# Patient Record
Sex: MALE | Race: WHITE | NOT HISPANIC OR LATINO | Employment: OTHER | URBAN - NONMETROPOLITAN AREA
[De-identification: names, ages, dates, MRNs, and addresses within clinical notes are randomized per-mention and may not be internally consistent; named-entity substitution may affect disease eponyms.]

---

## 2017-03-31 ENCOUNTER — OFFICE VISIT (OUTPATIENT)
Dept: OPHTHALMOLOGY | Facility: CLINIC | Age: 77
End: 2017-03-31

## 2017-03-31 DIAGNOSIS — IMO0002 NUCLEAR CATARACT, BILATERAL: ICD-10-CM

## 2017-03-31 DIAGNOSIS — E11.9 DIABETES MELLITUS WITHOUT COMPLICATION (HCC): ICD-10-CM

## 2017-03-31 DIAGNOSIS — H40.003 BORDERLINE GLAUCOMA, BILATERAL: Primary | ICD-10-CM

## 2017-03-31 DIAGNOSIS — H50.10 EXOTROPIA: ICD-10-CM

## 2017-03-31 PROCEDURE — 99213 OFFICE O/P EST LOW 20 MIN: CPT | Performed by: OPHTHALMOLOGY

## 2017-03-31 NOTE — PROGRESS NOTES
Subjective   David Chacon is a 76 y.o. male.   Chief Complaint   Patient presents with   • Cataract   • Diabetic Eye Exam   • Glaucoma Suspect     HPI     Cataract   Laterality: both eyes           Diabetic Eye Exam   Diabetes Type: Type 2   Duration: years   Blood Sugars: is controlled           Glaucoma Suspect   Laterality: both eyes   Compliance with Treatment: always           Comments   No vision change; XT       Last edited by Henrry Ramirez MD on 3/31/2017  8:38 AM. (History)          Review of Systems    Objective   Visual Acuity (Snellen - Linear)      Right Left   Dist cc 20/30 +2 20/60 +1       Correction:  Glasses         Wearing Rx      Sphere Cylinder Axis Add   Right [place] +1.00 022 +3.00   Left +0.25 +1.00 174 +3.00                  Not recorded         Extraocular Movement      Right Left   Result Full Full              Tonometry (Applanation, 8:40 AM)      Right Left   Pressure 15 15              Main Ophthalmology Exam     External Exam      Right Left    External Normal Normal      Slit Lamp Exam      Right Left    Lids/Lashes Normal Normal    Conjunctiva/Sclera White and quiet White and quiet    Cornea Clear Clear    Anterior Chamber Deep and quiet Deep and quiet    Iris Round and reactive Round and reactive    Lens 1+ Nuclear sclerosis 2+ Nuclear sclerosis    Vitreous Normal Normal      Fundus Exam      Right Left    Disc Thin rim 0.2 ST, IT Thin rim 0.1 ST, IT    Macula Normal Normal                Assessment/Plan   Diagnoses and all orders for this visit:    Borderline glaucoma, bilateral    Nuclear cataract, bilateral    Exotropia    Diabetes mellitus without complication    Plan:   cont latanoprost    Return in about 4 months (around 7/31/2017) for OCT discs.

## 2017-04-07 ENCOUNTER — OFFICE VISIT (OUTPATIENT)
Dept: OPHTHALMOLOGY | Facility: CLINIC | Age: 77
End: 2017-04-07

## 2017-04-07 DIAGNOSIS — E11.9 DIABETES MELLITUS WITHOUT COMPLICATION (HCC): ICD-10-CM

## 2017-04-07 DIAGNOSIS — H35.372 EPIRETINAL MEMBRANE, LEFT: ICD-10-CM

## 2017-04-07 DIAGNOSIS — H53.122 TRANSIENT VISUAL LOSS, LEFT: Primary | ICD-10-CM

## 2017-04-07 DIAGNOSIS — H50.10 EXOTROPIA: ICD-10-CM

## 2017-04-07 DIAGNOSIS — IMO0002 NUCLEAR CATARACT, BILATERAL: ICD-10-CM

## 2017-04-07 DIAGNOSIS — H40.003 BORDERLINE GLAUCOMA, BILATERAL: ICD-10-CM

## 2017-04-07 PROBLEM — H35.379 EPIRETINAL MEMBRANE: Status: ACTIVE | Noted: 2017-04-07

## 2017-04-07 PROBLEM — H53.129 TRANSIENT VISUAL LOSS: Status: ACTIVE | Noted: 2017-04-07

## 2017-04-07 PROCEDURE — 92014 COMPRE OPH EXAM EST PT 1/>: CPT | Performed by: OPHTHALMOLOGY

## 2017-04-07 PROCEDURE — 92134 CPTRZ OPH DX IMG PST SGM RTA: CPT | Performed by: OPHTHALMOLOGY

## 2017-04-07 NOTE — PROGRESS NOTES
Subjective   David Chacon is a 76 y.o. male.   Chief Complaint   Patient presents with   • Blurred Vision     HPI     Blurred Vision   Laterality: left eye   Onset: sudden   Quality: hazy   Severity: moderate   Onset: 12   Frequency: constantly   Timing: throughout the day           Comments   BV upper half  OS since yesterday, better today; hx of ERM, s/p PPV       Last edited by Henrry Ramirez MD on 4/7/2017  5:04 PM. (History)          Review of Systems    Objective   Base Eye Exam     Visual Acuity (Snellen - Linear)      Right Left   Dist cc 20/30 +2 20/40         Tonometry      Right Left   Pressure  17         Dilation     Left eye:  1.0% Mydriacyl, 2.5% Christopher Synephrine @ 4:15 PM            Slit Lamp and Fundus Exam     External Exam      Right Left    External Normal Normal      Slit Lamp Exam      Right Left    Lids/Lashes Normal Normal    Conjunctiva/Sclera White and quiet White and quiet    Cornea Clear Clear    Anterior Chamber Deep and quiet Deep and quiet    Iris Round and reactive Round and reactive    Lens 1+ Nuclear sclerosis 3+ Nuclear sclerosis    Vitreous Normal Normal      Fundus Exam      Right Left    Disc  Thin rim 0.1 ST, IT    Macula  pale retina edema inf to fove    Vessels  Normal, no embolus    Periphery  Normal            Refraction     Wearing Rx      Sphere Cylinder Axis Add   Right [place] +1.00 022 +3.00   Left +0.25 +1.00 174 +3.00                 OCT Macula:    OS- retinal thickening inf to fovea with irregular surface, no ERM    Assessment/Plan   Diagnoses and all orders for this visit:    Transient visual loss, left  Comments:  possible BRAO; r/o embolic source    Epiretinal membrane, left  Comments:  s/p PPV,     Exotropia    Nuclear cataract, bilateral    Borderline glaucoma, bilateral    Diabetes mellitus without complication      Plan:     rec re eval by Dr Carnes, Dr Hamman for possible embolic source  Continue latanoprost and ASA    Return in about 3 weeks (around  4/28/2017).

## 2017-04-25 ENCOUNTER — OFFICE VISIT (OUTPATIENT)
Dept: CARDIAC SURGERY | Facility: CLINIC | Age: 77
End: 2017-04-25

## 2017-04-25 ENCOUNTER — APPOINTMENT (OUTPATIENT)
Dept: LAB | Facility: HOSPITAL | Age: 77
End: 2017-04-25

## 2017-04-25 VITALS
DIASTOLIC BLOOD PRESSURE: 65 MMHG | HEART RATE: 62 BPM | WEIGHT: 175 LBS | BODY MASS INDEX: 25.92 KG/M2 | OXYGEN SATURATION: 98 % | HEIGHT: 69 IN | TEMPERATURE: 97.4 F | SYSTOLIC BLOOD PRESSURE: 113 MMHG

## 2017-04-25 DIAGNOSIS — R09.89 BRUIT: Primary | ICD-10-CM

## 2017-04-25 DIAGNOSIS — H53.9 VISION CHANGES: ICD-10-CM

## 2017-04-25 DIAGNOSIS — H34.9: ICD-10-CM

## 2017-04-25 LAB
ALBUMIN SERPL-MCNC: 4.3 G/DL (ref 3.4–4.8)
ALBUMIN/GLOB SERPL: 1.3 G/DL (ref 1.1–1.8)
ALP SERPL-CCNC: 69 U/L (ref 38–126)
ALT SERPL W P-5'-P-CCNC: 36 U/L (ref 21–72)
ANION GAP SERPL CALCULATED.3IONS-SCNC: 13 MMOL/L (ref 5–15)
AST SERPL-CCNC: 25 U/L (ref 17–59)
BASOPHILS # BLD AUTO: 0 10*3/MM3 (ref 0–0.2)
BASOPHILS NFR BLD AUTO: 0 % (ref 0–2)
BILIRUB SERPL-MCNC: 0.4 MG/DL (ref 0.2–1.3)
BUN BLD-MCNC: 39 MG/DL (ref 7–21)
BUN/CREAT SERPL: 20 (ref 7–25)
CALCIUM SPEC-SCNC: 9.2 MG/DL (ref 8.4–10.2)
CHLORIDE SERPL-SCNC: 104 MMOL/L (ref 95–110)
CO2 SERPL-SCNC: 23 MMOL/L (ref 22–31)
CREAT BLD-MCNC: 1.95 MG/DL (ref 0.7–1.3)
DEPRECATED RDW RBC AUTO: 43.6 FL (ref 35.1–43.9)
EOSINOPHIL # BLD AUTO: 0.19 10*3/MM3 (ref 0–0.7)
EOSINOPHIL NFR BLD AUTO: 3 % (ref 0–7)
ERYTHROCYTE [DISTWIDTH] IN BLOOD BY AUTOMATED COUNT: 13.1 % (ref 11.5–14.5)
GFR SERPL CREATININE-BSD FRML MDRD: 34 ML/MIN/1.73 (ref 42–98)
GLOBULIN UR ELPH-MCNC: 3.3 GM/DL (ref 2.3–3.5)
GLUCOSE BLD-MCNC: 193 MG/DL (ref 60–100)
HCT VFR BLD AUTO: 37.4 % (ref 39–49)
HGB BLD-MCNC: 12.7 G/DL (ref 13.7–17.3)
IMM GRANULOCYTES # BLD: 0.02 10*3/MM3 (ref 0–0.02)
IMM GRANULOCYTES NFR BLD: 0.3 % (ref 0–0.5)
LYMPHOCYTES # BLD AUTO: 1.78 10*3/MM3 (ref 0.6–4.2)
LYMPHOCYTES NFR BLD AUTO: 28.5 % (ref 10–50)
MCH RBC QN AUTO: 31 PG (ref 26.5–34)
MCHC RBC AUTO-ENTMCNC: 34 G/DL (ref 31.5–36.3)
MCV RBC AUTO: 91.2 FL (ref 80–98)
MONOCYTES # BLD AUTO: 0.5 10*3/MM3 (ref 0–0.9)
MONOCYTES NFR BLD AUTO: 8 % (ref 0–12)
NEUTROPHILS # BLD AUTO: 3.75 10*3/MM3 (ref 2–8.6)
NEUTROPHILS NFR BLD AUTO: 60.2 % (ref 37–80)
PLATELET # BLD AUTO: 164 10*3/MM3 (ref 150–450)
PMV BLD AUTO: 11.1 FL (ref 8–12)
POTASSIUM BLD-SCNC: 5 MMOL/L (ref 3.5–5.1)
PROT SERPL-MCNC: 7.6 G/DL (ref 6.3–8.6)
RBC # BLD AUTO: 4.1 10*6/MM3 (ref 4.37–5.74)
SODIUM BLD-SCNC: 140 MMOL/L (ref 137–145)
WBC NRBC COR # BLD: 6.24 10*3/MM3 (ref 3.2–9.8)

## 2017-04-25 PROCEDURE — 85025 COMPLETE CBC W/AUTO DIFF WBC: CPT | Performed by: THORACIC SURGERY (CARDIOTHORACIC VASCULAR SURGERY)

## 2017-04-25 PROCEDURE — 80053 COMPREHEN METABOLIC PANEL: CPT | Performed by: THORACIC SURGERY (CARDIOTHORACIC VASCULAR SURGERY)

## 2017-04-25 PROCEDURE — 99202 OFFICE O/P NEW SF 15 MIN: CPT | Performed by: THORACIC SURGERY (CARDIOTHORACIC VASCULAR SURGERY)

## 2017-04-25 PROCEDURE — 36415 COLL VENOUS BLD VENIPUNCTURE: CPT | Performed by: THORACIC SURGERY (CARDIOTHORACIC VASCULAR SURGERY)

## 2017-04-25 RX ORDER — CLOPIDOGREL BISULFATE 75 MG/1
75 TABLET ORAL DAILY
Qty: 90 TABLET | Refills: 2 | Status: SHIPPED | OUTPATIENT
Start: 2017-04-25 | End: 2018-01-12 | Stop reason: SDUPTHER

## 2017-04-25 NOTE — PROGRESS NOTES
"David Chacon  1940            76 y.o.      4/25/2017    Chief Complaint   Patient presents with   • retinal TIA r/o emboli Left eye   Primary Dr. Yehuda Victor  Ophthalmologist : Dr. Henrry Ramirez    Sudden vision loss left eye 3 weeks prior and  was seen by Dr. Ramirez on 4/7/17. A diagnosis of retinal artery embolus Left was made by Dr. Ramirez.  Patient relates that vision has almost returned to \"pre-episode\" status except for 1 small portion of left visual field.  Patient states that he also has \"Immature\" cataract left eye.    HPI  .  The patient has experienced no stroke or TIA symptoms.       ROS   Back problems, diabetes mellitus, hypertension, kidney problems.    FH  diabetes mellitus, hypertension, heart disease, kidney disease    SOCIAL HISTORY:      Current Outpatient Prescriptions:   •  aspirin 325 MG tablet, Take 325 mg by mouth Daily., Disp: , Rfl:   •  atorvastatin (LIPITOR) 10 MG tablet, Take 10 mg by mouth Daily., Disp: , Rfl:   •  FLUoxetine (PROzac) 20 MG capsule, Take 20 mg by mouth Daily., Disp: , Rfl:   •  folic acid (FOLVITE) 1 MG tablet, Take 1 mg by mouth Daily., Disp: , Rfl:   •  glimepiride (AMARYL) 4 MG tablet, Take 4 mg by mouth Every Morning Before Breakfast., Disp: , Rfl:   •  latanoprost (XALATAN) 0.005 % ophthalmic solution, Administer 1 drop to both eyes Every Night., Disp: 1 each, Rfl: 12  •  olmesartan (BENICAR) 20 MG tablet, Take 20 mg by mouth Daily., Disp: , Rfl:   •  Omega-3 Fatty Acids (FISH OIL) 1000 MG capsule capsule, Take  by mouth Daily With Breakfast., Disp: , Rfl:   •  sitaGLIPtin (JANUVIA) 100 MG tablet, Take 100 mg by mouth Daily., Disp: , Rfl:   •  azithromycin (ZITHROMAX) 250 MG tablet, Take 250 mg by mouth Daily. Take 2 tablets the first day, then 1 tablet daily for 4 days., Disp: , Rfl:   •  cefprozil (CEFZIL) 500 MG tablet, Take 500 mg by mouth 2 (Two) Times a Day., Disp: , Rfl:   •  chlorhexidine (PERIDEX) 0.12 % solution, Apply 15 mL to the mouth or " "throat 2 (Two) Times a Day., Disp: , Rfl:   •  clopidogrel (PLAVIX) 75 MG tablet, Take 1 tablet by mouth Daily., Disp: 90 tablet, Rfl: 2  •  metFORMIN (GLUCOPHAGE) 500 MG tablet, Take 500 mg by mouth 2 (Two) Times a Day With Meals., Disp: , Rfl:   •  promethazine-dextromethorphan (PROMETHAZINE-DM) 6.25-15 MG/5ML syrup, Take  by mouth 4 (Four) Times a Day As Needed for cough., Disp: , Rfl:     The following portions of the patient's history were reviewed and updated as appropriate: allergies, current medications, past family history, past medical history, past social history, past surgical history and problem list.        Past Surgical History:   Procedure Laterality Date   • OTHER SURGICAL HISTORY  05/31/2016    EXTENDED VISUAL FIELDS STUDY 87516 (Open angle with borderline findings, low risk, unspecified eye   • OTHER SURGICAL HISTORY  04/26/2016    FINDINGS OF DIL MAC OR FUND EXAM COMM TO MD 5010F (Type 2 diabetes mellitus 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   • OTHER SURGICAL HISTORY  04/26/2016    OCT DISC NFL 93497 (Type 2 diabetes mellitus 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)    • OTHER SURGICAL HISTORY  09/30/2015    OCT SCAN RETINA 17297 (Epiretinal membrane           Physical Exam  /65 (BP Location: Right leg)  Pulse 62  Temp 97.4 °F (36.3 °C) (Oral)   Ht 69\" (175.3 cm)  Wt 175 lb (79.4 kg)  SpO2 98%  BMI 25.84 kg/m2    HEENT: No masses soft sounds heard over carotid arteries    CHEST: Clear to auscultation    HEART: Normal rate and rhythm    ABDOMEN: Soft flat non tender, bruit audible upper abdomen    EXTREMITIES: Pulses palpable posterior tibial artery right ankle, pulses palpable dorsalis pedis posterior tibial artery left ankle    VASCULAR LAB STUDIES:    CAROTID DUPLEX SCAN:( 4-25-17)    Right 0-49%   LEFT (0-49%)                                                   WAVE FORMS                                        Antegrade  "     Antegrade    U/S AORTA  NO  AAA            RADIOLOGY:      Bruit [R09.89]    IMPRESSION:  1. Ocular T.I.A. Left (amaurosis fugax) 3 weeks prior with good return of vision  2. No AAA  3.Peripheral pulses palpable at ankles                 Assessment/Plan  David was seen today for retinal tia r/o emboli left eye.    Diagnoses and all orders for this visit:    Bruit  -     Duplex Carotid Ultrasound CAR  -     Duplex Aorta IVC Iliac Graft Limited CAR  -     CT Angiogram Carotids    Vision changes  -     Duplex Carotid Ultrasound CAR  -     CT Angiogram Carotids  -     Comprehensive Metabolic Panel  -     CBC & Differential    Retinal embolus, left  -     CT Angiogram Carotids  -     Comprehensive Metabolic Panel  -     CBC & Differential    Other orders  -     clopidogrel (PLAVIX) 75 MG tablet; Take 1 tablet by mouth Daily.              Plan     CTA carotids planned but cancelled with finding GFR 34  Return 6 months for carotid duplex scan             Jack L. Hamman, MD  4/25/2017

## 2017-04-26 ENCOUNTER — APPOINTMENT (OUTPATIENT)
Dept: CT IMAGING | Facility: HOSPITAL | Age: 77
End: 2017-04-26
Attending: THORACIC SURGERY (CARDIOTHORACIC VASCULAR SURGERY)

## 2017-04-26 LAB
ABDOMINAL DIST AORTA AP: 1.61 CM
ABDOMINAL DIST AORTA TRANS: 1.33 CM
ABDOMINAL LT COM ILIAC AP: 1.04 CM
ABDOMINAL LT COM ILIAC TRANS: 0.96 CM
ABDOMINAL MID AORTA AP: 1.37 CM
ABDOMINAL MID AORTA TRANS: 1.61 CM
ABDOMINAL RT COM ILIAC AP: 0.92 CM
ABDOMINAL RT COM ILIAC TRANS: 0.8 CM
BH CV XLRA MEAS LEFT DIST CCA EDV: 16 CM/SEC
BH CV XLRA MEAS LEFT DIST CCA PSV: 87 CM/SEC
BH CV XLRA MEAS LEFT DIST ICA EDV: 30 CM/SEC
BH CV XLRA MEAS LEFT DIST ICA PSV: 99 CM/SEC
BH CV XLRA MEAS LEFT MID ICA EDV: 31 CM/SEC
BH CV XLRA MEAS LEFT MID ICA PSV: 109 CM/SEC
BH CV XLRA MEAS LEFT PROX CCA EDV: 18 CM/SEC
BH CV XLRA MEAS LEFT PROX CCA PSV: 107 CM/SEC
BH CV XLRA MEAS LEFT PROX ECA EDV: 10 CM/SEC
BH CV XLRA MEAS LEFT PROX ECA PSV: 148 CM/SEC
BH CV XLRA MEAS LEFT PROX ICA EDV: 22 CM/SEC
BH CV XLRA MEAS LEFT PROX ICA PSV: 151 CM/SEC
BH CV XLRA MEAS LEFT VERTEBRAL A EDV: 12 CM/SEC
BH CV XLRA MEAS LEFT VERTEBRAL A PSV: 59 CM/SEC
BH CV XLRA MEAS RIGHT DIST CCA EDV: 25 CM/SEC
BH CV XLRA MEAS RIGHT DIST CCA PSV: 127 CM/SEC
BH CV XLRA MEAS RIGHT DIST ICA EDV: 26 CM/SEC
BH CV XLRA MEAS RIGHT DIST ICA PSV: 95 CM/SEC
BH CV XLRA MEAS RIGHT MID ICA EDV: 30 CM/SEC
BH CV XLRA MEAS RIGHT MID ICA PSV: 104 CM/SEC
BH CV XLRA MEAS RIGHT PROX CCA EDV: 17 CM/SEC
BH CV XLRA MEAS RIGHT PROX CCA PSV: 122 CM/SEC
BH CV XLRA MEAS RIGHT PROX ECA EDV: 7 CM/SEC
BH CV XLRA MEAS RIGHT PROX ECA PSV: 120 CM/SEC
BH CV XLRA MEAS RIGHT PROX ICA EDV: 24 CM/SEC
BH CV XLRA MEAS RIGHT PROX ICA PSV: 99 CM/SEC
BH CV XLRA MEAS RIGHT VERTEBRAL A EDV: 12 CM/SEC
BH CV XLRA MEAS RIGHT VERTEBRAL A PSV: 71 CM/SEC

## 2017-04-27 DIAGNOSIS — I65.23 BILATERAL CAROTID ARTERY STENOSIS: Primary | ICD-10-CM

## 2017-04-28 ENCOUNTER — OFFICE VISIT (OUTPATIENT)
Dept: OPHTHALMOLOGY | Facility: CLINIC | Age: 77
End: 2017-04-28

## 2017-04-28 DIAGNOSIS — H53.122 TRANSIENT VISUAL LOSS, LEFT: Primary | ICD-10-CM

## 2017-04-28 DIAGNOSIS — H40.003 BORDERLINE GLAUCOMA, BILATERAL: ICD-10-CM

## 2017-04-28 DIAGNOSIS — IMO0002 NUCLEAR CATARACT, BILATERAL: ICD-10-CM

## 2017-04-28 PROCEDURE — 99213 OFFICE O/P EST LOW 20 MIN: CPT | Performed by: OPHTHALMOLOGY

## 2017-04-28 NOTE — PROGRESS NOTES
Chio Chacon is a 76 y.o. male.   Chief Complaint   Patient presents with   • Glaucoma   • transient vision loss left eye   • epiretinal membrane left eye     HPI     Vision OS better, Dr Hamman rec plavix       Last edited by Henrry Ramirez MD on 4/28/2017 11:12 AM.       Review of Systems    Objective   Base Eye Exam     Visual Acuity (Snellen - Linear)      Right Left   Dist cc 20/30 20/50       Correction:  Glasses            Slit Lamp and Fundus Exam     External Exam      Right Left    External Normal Normal      Slit Lamp Exam      Right Left    Lids/Lashes Normal Normal    Conjunctiva/Sclera White and quiet White and quiet    Cornea Clear Clear    Anterior Chamber Deep and quiet Deep and quiet    Iris Round and reactive Round and reactive    Lens 1+ Nuclear sclerosis 3+ Nuclear sclerosis    Vitreous Normal Normal      Fundus Exam      Right Left    Disc rim 0.2 ST, IT Thin rim 0.1 ST, IT    Macula Normal Normal    Vessels Normal Normal, no embolus                Assessment/Plan   Diagnoses and all orders for this visit:    Transient visual loss, left    Borderline glaucoma, bilateral    Nuclear cataract, bilateral      Plan:    Continue current drops      Return in about 3 months (around 7/28/2017).

## 2017-05-01 ENCOUNTER — OFFICE VISIT (OUTPATIENT)
Dept: CARDIOLOGY | Facility: CLINIC | Age: 77
End: 2017-05-01

## 2017-05-01 VITALS
SYSTOLIC BLOOD PRESSURE: 138 MMHG | HEIGHT: 69 IN | DIASTOLIC BLOOD PRESSURE: 58 MMHG | HEART RATE: 60 BPM | BODY MASS INDEX: 26.07 KG/M2 | WEIGHT: 176 LBS

## 2017-05-01 DIAGNOSIS — I10 BENIGN ESSENTIAL HYPERTENSION: Primary | ICD-10-CM

## 2017-05-01 DIAGNOSIS — Z98.890 S/P PERICARDIAL OPERATION: ICD-10-CM

## 2017-05-01 DIAGNOSIS — N18.4 CHRONIC KIDNEY DISEASE, STAGE 4 (SEVERE) (HCC): ICD-10-CM

## 2017-05-01 DIAGNOSIS — E11.9 TYPE 2 DIABETES MELLITUS WITHOUT COMPLICATION, WITHOUT LONG-TERM CURRENT USE OF INSULIN (HCC): ICD-10-CM

## 2017-05-01 DIAGNOSIS — E78.00 PURE HYPERCHOLESTEROLEMIA: ICD-10-CM

## 2017-05-01 DIAGNOSIS — I20.9 ANGINA PECTORIS (HCC): ICD-10-CM

## 2017-05-01 PROCEDURE — 93000 ELECTROCARDIOGRAM COMPLETE: CPT | Performed by: INTERNAL MEDICINE

## 2017-05-01 PROCEDURE — 99203 OFFICE O/P NEW LOW 30 MIN: CPT | Performed by: INTERNAL MEDICINE

## 2017-05-03 DIAGNOSIS — R09.89 BRUIT: Primary | ICD-10-CM

## 2017-05-16 LAB
BH CV ECHO MEAS - ACS: 1.9 CM
BH CV ECHO MEAS - AO MAX PG (FULL): 1.6 MMHG
BH CV ECHO MEAS - AO MAX PG: 6.8 MMHG
BH CV ECHO MEAS - AO MEAN PG (FULL): 2 MMHG
BH CV ECHO MEAS - AO MEAN PG: 4 MMHG
BH CV ECHO MEAS - AO ROOT AREA (BSA CORRECTED): 1.8
BH CV ECHO MEAS - AO ROOT AREA: 9.6 CM^2
BH CV ECHO MEAS - AO ROOT DIAM: 3.5 CM
BH CV ECHO MEAS - AO V2 MAX: 130 CM/SEC
BH CV ECHO MEAS - AO V2 MEAN: 90.5 CM/SEC
BH CV ECHO MEAS - AO V2 VTI: 32.9 CM
BH CV ECHO MEAS - BSA(HAYCOCK): 2 M^2
BH CV ECHO MEAS - BSA: 2 M^2
BH CV ECHO MEAS - BZI_BMI: 26 KILOGRAMS/M^2
BH CV ECHO MEAS - BZI_METRIC_HEIGHT: 175.3 CM
BH CV ECHO MEAS - BZI_METRIC_WEIGHT: 79.8 KG
BH CV ECHO MEAS - EDV(CUBED): 157.5 ML
BH CV ECHO MEAS - EDV(TEICH): 141.3 ML
BH CV ECHO MEAS - EF(CUBED): 62.3 %
BH CV ECHO MEAS - EF(MOD-SP4): 60 %
BH CV ECHO MEAS - EF(TEICH): 53.4 %
BH CV ECHO MEAS - EPSS: 0.5 CM
BH CV ECHO MEAS - ESV(CUBED): 59.3 ML
BH CV ECHO MEAS - ESV(TEICH): 65.9 ML
BH CV ECHO MEAS - FS: 27.8 %
BH CV ECHO MEAS - IVS/LVPW: 0.5
BH CV ECHO MEAS - IVSD: 0.8 CM
BH CV ECHO MEAS - LA DIMENSION: 4 CM
BH CV ECHO MEAS - LA/AO: 1.1
BH CV ECHO MEAS - LV MASS(C)D: 264.4 GRAMS
BH CV ECHO MEAS - LV MASS(C)DI: 135.2 GRAMS/M^2
BH CV ECHO MEAS - LV MAX PG: 5.2 MMHG
BH CV ECHO MEAS - LV MEAN PG: 2 MMHG
BH CV ECHO MEAS - LV V1 MAX: 114 CM/SEC
BH CV ECHO MEAS - LV V1 MEAN: 69.2 CM/SEC
BH CV ECHO MEAS - LV V1 VTI: 27.7 CM
BH CV ECHO MEAS - LVIDD: 5.4 CM
BH CV ECHO MEAS - LVIDS: 3.9 CM
BH CV ECHO MEAS - LVPWD: 1.6 CM
BH CV ECHO MEAS - MR MAX PG: 31.8 MMHG
BH CV ECHO MEAS - MR MAX VEL: 281 CM/SEC
BH CV ECHO MEAS - MV A MAX VEL: 110 CM/SEC
BH CV ECHO MEAS - MV E MAX VEL: 94.3 CM/SEC
BH CV ECHO MEAS - MV E/A: 0.86
BH CV ECHO MEAS - PA MAX PG: 6.3 MMHG
BH CV ECHO MEAS - PA MEAN PG: 4 MMHG
BH CV ECHO MEAS - PA V2 MAX: 125 CM/SEC
BH CV ECHO MEAS - PA V2 MEAN: 87.9 CM/SEC
BH CV ECHO MEAS - PA V2 VTI: 31.5 CM
BH CV ECHO MEAS - PI END-D VEL: 109 CM/SEC
BH CV ECHO MEAS - RAP SYSTOLE: 10 MMHG
BH CV ECHO MEAS - RVDD: 2.3 CM
BH CV ECHO MEAS - RVSP: 34 MMHG
BH CV ECHO MEAS - SI(AO): 161.8 ML/M^2
BH CV ECHO MEAS - SI(CUBED): 50.2 ML/M^2
BH CV ECHO MEAS - SI(TEICH): 38.5 ML/M^2
BH CV ECHO MEAS - SV(AO): 316.5 ML
BH CV ECHO MEAS - SV(CUBED): 98.1 ML
BH CV ECHO MEAS - SV(TEICH): 75.4 ML
BH CV ECHO MEAS - TR MAX VEL: 244.7 CM/SEC
BH CV STRESS COMMENTS STAGE 1: NORMAL
BH CV STRESS DOSE REGADENOSON STAGE 1: 0.4
BH CV STRESS DURATION MIN STAGE 1: 0
BH CV STRESS DURATION SEC STAGE 1: 15
BH CV STRESS PROTOCOL 1: NORMAL
BH CV STRESS RECOVERY BP: NORMAL MMHG
BH CV STRESS RECOVERY HR: 67 BPM
BH CV STRESS STAGE 1: 1
LV EF 2D ECHO EST: 55 %
LV EF NUC BP: 79 %
MAXIMAL PREDICTED HEART RATE: 145 BPM
PERCENT MAX PREDICTED HR: 46.9 %
STRESS BASELINE BP: NORMAL MMHG
STRESS BASELINE HR: 55 BPM
STRESS PERCENT HR: 55 %
STRESS POST PEAK BP: NORMAL MMHG
STRESS POST PEAK HR: 68 BPM
STRESS TARGET HR: 123 BPM

## 2017-05-26 ENCOUNTER — OFFICE VISIT (OUTPATIENT)
Dept: CARDIOLOGY | Facility: CLINIC | Age: 77
End: 2017-05-26

## 2017-05-26 VITALS
HEIGHT: 69 IN | WEIGHT: 175 LBS | BODY MASS INDEX: 25.92 KG/M2 | HEART RATE: 60 BPM | SYSTOLIC BLOOD PRESSURE: 130 MMHG | DIASTOLIC BLOOD PRESSURE: 58 MMHG

## 2017-05-26 DIAGNOSIS — I10 BENIGN ESSENTIAL HYPERTENSION: Primary | ICD-10-CM

## 2017-05-26 DIAGNOSIS — E11.9 TYPE 2 DIABETES MELLITUS WITHOUT COMPLICATION, WITHOUT LONG-TERM CURRENT USE OF INSULIN (HCC): ICD-10-CM

## 2017-05-26 DIAGNOSIS — E78.00 PURE HYPERCHOLESTEROLEMIA: ICD-10-CM

## 2017-05-26 PROCEDURE — 99212 OFFICE O/P EST SF 10 MIN: CPT | Performed by: INTERNAL MEDICINE

## 2017-07-27 ENCOUNTER — OFFICE VISIT (OUTPATIENT)
Dept: OPHTHALMOLOGY | Facility: CLINIC | Age: 77
End: 2017-07-27

## 2017-07-27 DIAGNOSIS — IMO0002 NUCLEAR CATARACT, BILATERAL: ICD-10-CM

## 2017-07-27 DIAGNOSIS — H40.003 BORDERLINE GLAUCOMA, BILATERAL: Primary | ICD-10-CM

## 2017-07-27 PROCEDURE — 99213 OFFICE O/P EST LOW 20 MIN: CPT | Performed by: OPHTHALMOLOGY

## 2017-07-27 NOTE — PROGRESS NOTES
Subjective   David Chacon is a 76 y.o. male.   Chief Complaint   Patient presents with   • Cataract   • borderline glaucoma     HPI     Cataract   Laterality: both eyes       Last edited by Renee Wilson MA on 7/27/2017  9:22 AM. (History)          Review of Systems    Objective   Base Eye Exam     Visual Acuity (Snellen - Linear)      Right Left   Dist cc 20/30 -2 20/50       Correction:  Glasses      Tonometry (Applanation, 9:37 AM)      Right Left   Pressure 14 14               Slit Lamp and Fundus Exam     External Exam      Right Left    External Normal Normal      Slit Lamp Exam      Right Left    Lids/Lashes Normal Normal    Conjunctiva/Sclera White and quiet White and quiet    Cornea Clear Clear    Anterior Chamber Deep and quiet Deep and quiet    Iris Round and reactive Round and reactive    Lens 1+ Nuclear sclerosis 3+ Nuclear sclerosis    Vitreous Normal Normal      Fundus Exam      Right Left    Disc rim 0.2 ST, IT Thin rim 0.1 ST, IT    Macula Normal Normal    Vessels Normal Normal, no embolus            Refraction     Wearing Rx      Sphere Cylinder Axis Add   Right Echo +1.00 022 +3.00   Left +0.25 +1.00 174 +3.00                   Assessment/Plan   Diagnoses and all orders for this visit:    Borderline glaucoma, bilateral    Nuclear cataract, bilateral      Plan:   continue latanoprost  F/u ophth    months, pt prefers Dr Mclain  No Follow-up on file.

## 2017-08-19 DIAGNOSIS — H40.003 BORDERLINE GLAUCOMA, BILATERAL: ICD-10-CM

## 2017-08-22 RX ORDER — LATANOPROST 50 UG/ML
SOLUTION/ DROPS OPHTHALMIC
Qty: 2.5 ML | Refills: 12 | Status: SHIPPED | OUTPATIENT
Start: 2017-08-22

## 2017-12-14 ENCOUNTER — OFFICE VISIT (OUTPATIENT)
Dept: CARDIAC SURGERY | Facility: CLINIC | Age: 77
End: 2017-12-14

## 2017-12-14 VITALS
OXYGEN SATURATION: 98 % | SYSTOLIC BLOOD PRESSURE: 120 MMHG | DIASTOLIC BLOOD PRESSURE: 60 MMHG | HEIGHT: 69 IN | TEMPERATURE: 97.2 F | HEART RATE: 64 BPM | BODY MASS INDEX: 25.92 KG/M2 | WEIGHT: 175 LBS

## 2017-12-14 DIAGNOSIS — H53.122 TRANSIENT VISUAL LOSS OF LEFT EYE: Primary | ICD-10-CM

## 2017-12-14 PROCEDURE — 99213 OFFICE O/P EST LOW 20 MIN: CPT | Performed by: THORACIC SURGERY (CARDIOTHORACIC VASCULAR SURGERY)

## 2017-12-14 RX ORDER — OLMESARTAN MEDOXOMIL 20 MG/1
10 TABLET ORAL DAILY
COMMUNITY
End: 2018-05-25

## 2017-12-14 NOTE — PROGRESS NOTES
David Chacon  1940            76 y.o.      12/14/2017    Chief Complaint   Patient presents with   • Carotid Artery Disease     6 month follow up    Please recall that Mr. David Chacon 76-year-old man was seen by Dr. Henrry Ramirez on 4/7/17 with sudden visual loss left eye.  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  When first seen by me on 4/25/17 vision had almost returned her pre-episode status except for 1 small portion of the left visual field.  Carotid duplex scan at that time demonstrated 0-49% stenosis bilaterally with antegrade flow in both vertebral arteries.  The patient had not experienced stroke or TIA symptoms and has had no visual or central nervous system symptoms since having been seen in April 2017 he had a CTA was considered but canceled with a relatively normal carotid duplex scan and a GFR of 34.  We wanted to avoid contrast exposure with the finding of CKD stage 3.        HPI the patient has since undergone bilateral cataract extraction.  Vision is quite satisfactory and he has experienced no central nervous system symptoms whatsoever    ROS  Back problems, diabetes mellitus, hypertension and above-mentioned stage III chronic kidney disease        Current Outpatient Prescriptions:   •  aspirin 81 MG tablet, Take 1 tablet by mouth Daily., Disp: 30 tablet, Rfl: 11  •  atorvastatin (LIPITOR) 10 MG tablet, Take 10 mg by mouth Daily., Disp: , Rfl:   •  Cinnamon 500 MG tablet, Take 1,000 mg by mouth 2 (Two) Times a Day., Disp: , Rfl:   •  clopidogrel (PLAVIX) 75 MG tablet, Take 1 tablet by mouth Daily., Disp: 90 tablet, Rfl: 2  •  FLUoxetine (PROzac) 20 MG capsule, Take 20 mg by mouth Daily., Disp: , Rfl:   •  folic acid (FOLVITE) 1 MG tablet, Take 1 mg by mouth Daily., Disp: , Rfl:   •  glimepiride (AMARYL) 4 MG tablet, Take 4 mg by mouth Every Morning Before Breakfast., Disp: , Rfl:   •  latanoprost (XALATAN) 0.005 % ophthalmic solution, ADMINISTER 1 DROP TO BOTH  "EYES EVERY NIGHT., Disp: 2.5 mL, Rfl: 12  •  olmesartan (BENICAR) 20 MG tablet, Take 20 mg by mouth Daily., Disp: , Rfl:   •  Omega-3 Fatty Acids (FISH OIL) 1000 MG capsule capsule, Take  by mouth Daily With Breakfast., Disp: , Rfl:   •  sitaGLIPtin (JANUVIA) 100 MG tablet, Take 100 mg by mouth Daily., Disp: , Rfl:     The following portions of the patient's history were reviewed and updated as appropriate: allergies, current medications, past family history, past medical history, past social history, past surgical history and problem list.        Past Surgical History:   Procedure Laterality Date   • CARDIAC CATHETERIZATION     • LAMINOTOMY Left 01/06/1969    Exc HNP 5th lumbar   • OTHER SURGICAL HISTORY  05/31/2016    EXTENDED VISUAL FIELDS STUDY 00340 (Open angle with borderline findings, low risk, unspecified eye   • OTHER SURGICAL HISTORY  04/26/2016    FINDINGS OF DIL MAC OR FUND EXAM COMM TO MD 5010F (Type 2 diabetes mellitus 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   • OTHER SURGICAL HISTORY  04/26/2016    OCT DISC NFL 01843 (Type 2 diabetes mellitus 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)    • OTHER SURGICAL HISTORY  09/30/2015    OCT SCAN RETINA 07532 (Epiretinal membrane   • PERICARDIECTOMY  08/04/1972    Pericarditis etiol undetermined           Physical Exam  /60 (BP Location: Left arm)  Pulse 64  Temp 97.2 °F (36.2 °C) (Oral)   Ht 175.3 cm (69\")  Wt 79.4 kg (175 lb)  SpO2 98%  BMI 25.84 kg/m2     HEENT no masses no bruits  :    HEART Regular rate and rhythm    ABDOMEN:  Soft flat nontender     EXTREMITIES:posterior tibial pulses easily palpable laterally     VASCULAR LAB STUDIES: CAROTID DUPLEX SCAN (12-14-17)                                                RIGHT  0-49%                 LEFT 0-49%         VERTEBRAL FLOW     Antegrade                       Antegrade      ULTRASOUND AORTA  PRIOR DEMONSTRATED NO " AAA      RADIOLOGY:      Transient visual loss of left eye [H53.122]    IMPRESSION:  patient was seen initially in April 2017 with left retinal artery embolus which symptoms have abated.     upon return carotid duplex scan 0-49% stenosis bilaterally with antegrade flow in both vertebral arteries                 Assessment/Plan  David was seen today for carotid artery disease.    Diagnoses and all orders for this visit:    Transient visual loss of left eye                Plan      return in 1 year for carotid duplex scan             Jack L. Hamman, MD  12/14/2017

## 2017-12-16 LAB
BH CV XLRA MEAS CAROTID RIGHT ICA/CCA DIASTOLIC RATIO: 2.2
BH CV XLRA MEAS LEFT DIST CCA EDV: 20 CM/SEC
BH CV XLRA MEAS LEFT DIST CCA PSV: 92 CM/SEC
BH CV XLRA MEAS LEFT DIST ICA EDV: 33 CM/SEC
BH CV XLRA MEAS LEFT DIST ICA PSV: 115 CM/SEC
BH CV XLRA MEAS LEFT ICA/CCA DIASTOLIC RATIO: 2
BH CV XLRA MEAS LEFT ICA/CCA RATIO: 1
BH CV XLRA MEAS LEFT MID ICA EDV: 33 CM/SEC
BH CV XLRA MEAS LEFT MID ICA PSV: 113 CM/SEC
BH CV XLRA MEAS LEFT PROX CCA EDV: 16 CM/SEC
BH CV XLRA MEAS LEFT PROX CCA PSV: 113 CM/SEC
BH CV XLRA MEAS LEFT PROX ECA EDV: 12 CM/SEC
BH CV XLRA MEAS LEFT PROX ECA PSV: 136 CM/SEC
BH CV XLRA MEAS LEFT PROX ICA EDV: 22 CM/SEC
BH CV XLRA MEAS LEFT PROX ICA PSV: 98 CM/SEC
BH CV XLRA MEAS LEFT VERTEBRAL A EDV: 16 CM/SEC
BH CV XLRA MEAS LEFT VERTEBRAL A PSV: 62 CM/SEC
BH CV XLRA MEAS RIGHT DIST CCA EDV: 18 CM/SEC
BH CV XLRA MEAS RIGHT DIST CCA PSV: 104 CM/SEC
BH CV XLRA MEAS RIGHT DIST ICA EDV: 39 CM/SEC
BH CV XLRA MEAS RIGHT DIST ICA PSV: 119 CM/SEC
BH CV XLRA MEAS RIGHT ICA/CCA RATIO: 1.1
BH CV XLRA MEAS RIGHT MID ICA EDV: 31 CM/SEC
BH CV XLRA MEAS RIGHT MID ICA PSV: 104 CM/SEC
BH CV XLRA MEAS RIGHT PROX CCA EDV: 15 CM/SEC
BH CV XLRA MEAS RIGHT PROX CCA PSV: 98 CM/SEC
BH CV XLRA MEAS RIGHT PROX ECA EDV: 11 CM/SEC
BH CV XLRA MEAS RIGHT PROX ECA PSV: 177 CM/SEC
BH CV XLRA MEAS RIGHT PROX ICA EDV: 25 CM/SEC
BH CV XLRA MEAS RIGHT PROX ICA PSV: 100 CM/SEC
BH CV XLRA MEAS RIGHT VERTEBRAL A EDV: 14 CM/SEC
BH CV XLRA MEAS RIGHT VERTEBRAL A PSV: 66 CM/SEC

## 2018-01-15 RX ORDER — CLOPIDOGREL BISULFATE 75 MG/1
TABLET ORAL
Qty: 30 TABLET | Refills: 11 | Status: SHIPPED | OUTPATIENT
Start: 2018-01-15 | End: 2018-01-23 | Stop reason: SDUPTHER

## 2018-01-23 DIAGNOSIS — I73.9 PVD (PERIPHERAL VASCULAR DISEASE) (HCC): Primary | ICD-10-CM

## 2018-01-23 RX ORDER — CLOPIDOGREL BISULFATE 75 MG/1
75 TABLET ORAL DAILY
Qty: 90 TABLET | Refills: 4 | Status: SHIPPED | OUTPATIENT
Start: 2018-01-23 | End: 2018-04-23

## 2018-05-25 ENCOUNTER — OFFICE VISIT (OUTPATIENT)
Dept: CARDIOLOGY | Facility: CLINIC | Age: 78
End: 2018-05-25

## 2018-05-25 ENCOUNTER — DOCUMENTATION (OUTPATIENT)
Dept: CARDIOLOGY | Facility: CLINIC | Age: 78
End: 2018-05-25

## 2018-05-25 VITALS
WEIGHT: 170 LBS | BODY MASS INDEX: 25.18 KG/M2 | HEIGHT: 69 IN | DIASTOLIC BLOOD PRESSURE: 50 MMHG | HEART RATE: 53 BPM | SYSTOLIC BLOOD PRESSURE: 88 MMHG

## 2018-05-25 DIAGNOSIS — E11.9 TYPE 2 DIABETES MELLITUS WITHOUT COMPLICATION, WITHOUT LONG-TERM CURRENT USE OF INSULIN (HCC): ICD-10-CM

## 2018-05-25 DIAGNOSIS — R42 DIZZINESS: ICD-10-CM

## 2018-05-25 DIAGNOSIS — E78.00 PURE HYPERCHOLESTEROLEMIA: ICD-10-CM

## 2018-05-25 DIAGNOSIS — I10 BENIGN ESSENTIAL HYPERTENSION: Primary | ICD-10-CM

## 2018-05-25 PROCEDURE — 99214 OFFICE O/P EST MOD 30 MIN: CPT | Performed by: INTERNAL MEDICINE

## 2018-05-25 RX ORDER — TIMOLOL MALEATE 5 MG/ML
1 SOLUTION/ DROPS OPHTHALMIC TAKE AS DIRECTED
COMMUNITY

## 2018-05-25 RX ORDER — CLOPIDOGREL BISULFATE 75 MG/1
75 TABLET ORAL DAILY
COMMUNITY
End: 2018-12-21 | Stop reason: SDUPTHER

## 2018-05-25 RX ORDER — CHLORHEXIDINE GLUCONATE 0.12 MG/ML
15 RINSE ORAL 2 TIMES DAILY
COMMUNITY

## 2018-05-25 RX ORDER — OLMESARTAN MEDOXOMIL 20 MG/1
10 TABLET ORAL DAILY
Qty: 90 TABLET | Refills: 5 | Status: SHIPPED | OUTPATIENT
Start: 2018-05-25 | End: 2020-08-05

## 2018-05-25 NOTE — PROGRESS NOTES
[facility]  OFFICE NOTE    David Chacon  77 y.o. male    05/25/2018  1. Benign essential hypertension    2. Pure hypercholesterolemia    3. Type 2 diabetes mellitus without complication, without long-term current use of insulin    4. Dizziness        Chief complaint -Dizziness      History of present Illness- 76-year-old gentleman who had an embolic event to his eye subsequently had a stress test and echo which were unremarkable he had previous pericardiectomy in 1975 and there was no significant problems since and is not symptomatic.  He has been feeling dizzy and lightheaded and is on Benicar 20 mg and his blood pressure has been staying on the low side I cut down the dose of Benicar to 10 mg daily and is going to monitor his blood pressure.  We will do a tilt table test in about 2 weeks to rule out autonomic dysfunction as he is a diabetic              Allergies   Allergen Reactions   • Olmesartan Other (See Comments)     Patient had cough and dry skin to generic olmsartan. 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    • Epiretinal membrane      OS, s/p PPV, doing well      • Exotropia     - intermittent, stable      • Glaucoma suspect    • Hyperlipidemia    • Nonproliferative diabetic retinopathy     MILD   • Nuclear cataract    • Type 2 diabetes mellitus 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     EDEMA         Past Surgical History:   Procedure Laterality Date   • CARDIAC CATHETERIZATION     • CATARACT EXTRACTION EXTRACAPSULAR W/ INTRAOCULAR LENS IMPLANTATION     • LAMINOTOMY Left 01/06/1969    Exc HNP 5th lumbar   • OTHER SURGICAL HISTORY  05/31/2016    EXTENDED VISUAL FIELDS STUDY 95030 (Open angle with  borderline findings, low risk, unspecified eye   • OTHER SURGICAL HISTORY  04/26/2016    FINDINGS OF DIL MAC OR FUND EXAM COMM TO MD MayF (Type 2 diabetes mellitus 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   • OTHER SURGICAL HISTORY  04/26/2016    OCT DISC NFL 30307 (Type 2 diabetes mellitus 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)    • OTHER SURGICAL HISTORY  09/30/2015    OCT SCAN RETINA 75356 (Epiretinal membrane   • PERICARDIECTOMY  08/04/1972    Pericarditis etiol undetermined         No family history on file.      Social History     Social History   • Marital status:      Spouse name: N/A   • Number of children: N/A   • Years of education: N/A     Occupational History   • Not on file.     Social History Main Topics   • Smoking status: Former Smoker   • Smokeless tobacco: Never Used   • Alcohol use No   • Drug use: No   • Sexual activity: Defer     Other Topics Concern   • Not on file     Social History Narrative   • No narrative on file         Current Outpatient Prescriptions   Medication Sig Dispense Refill   • aspirin 81 MG tablet Take 1 tablet by mouth Daily. 30 tablet 11   • atorvastatin (LIPITOR) 10 MG tablet Take 10 mg by mouth Daily.     • chlorhexidine (PERIDEX) 0.12 % solution Apply 15 mL to the mouth or throat 2 (Two) Times a Day.     • Cinnamon 500 MG tablet Take 1,000 mg by mouth 2 (Two) Times a Day.     • clopidogrel (PLAVIX) 75 MG tablet Take 75 mg by mouth Daily.     • folic acid (FOLVITE) 1 MG tablet Take 1 mg by mouth Daily.     • glimepiride (AMARYL) 4 MG tablet Take 4 mg by mouth Every Morning Before Breakfast.     • latanoprost (XALATAN) 0.005 % ophthalmic solution ADMINISTER 1 DROP TO BOTH EYES EVERY NIGHT. 2.5 mL 12   • Omega-3 Fatty Acids (FISH OIL) 1000 MG capsule capsule Take  by mouth Daily With Breakfast.     • sitaGLIPtin (JANUVIA) 100 MG tablet Take 100 mg by mouth Daily.     • timolol (TIMOPTIC) 0.5 % ophthalmic  "solution Administer 1 drop to both eyes Take As Directed.     • olmesartan (BENICAR) 20 MG tablet Take 0.5 tablets by mouth Daily. 90 tablet 5     No current facility-administered medications for this visit.                    OBJECTIVE    BP (!) 88/50   Pulse 53   Ht 175.3 cm (69\")   Wt 77.1 kg (170 lb)   BMI 25.10 kg/m²       Physical Exam     Constitutional: is oriented to person, place, and time.     Skin-warm and dry    Well developed and nourished in no acute distress      Head: Normocephalic and atraumatic.     Eyes: Pupils are equal, round, and reactive to light.     Neck: Neck supple. No bruit in the carotids,     Cardiovascular: [place] in the fifth intercostal space   Regular rate, and  Rhythm,    S1 greater than S2, no S3 or S4, no gallop     Pulmonary/Chest:   Air  Entry is equal on both sides  No wheezing or crackles,      Abdominal: Soft.  No hepatosplenomegaly, bowel sounds are present    Musculoskeletal: No kyphoscoliosis, no significant thickening of the joints    Neurological: is alert and oriented to person, place, and time.    cranial nerve are intact .   No motor or sensory deficit    Extremities-no edema, no radial femoral delay      Psychiatric: He has a normal mood and affect.                  His behavior is normal.           Procedures      Lab Results   Component Value Date    WBC 6.24 04/25/2017    HGB 12.7 (L) 04/25/2017    HCT 37.4 (L) 04/25/2017    MCV 91.2 04/25/2017     04/25/2017     Lab Results   Component Value Date    GLUCOSE 193 (H) 04/25/2017    BUN 39 (H) 04/25/2017    CREATININE 1.95 (H) 04/25/2017    EGFRIFNONA 34 (L) 04/25/2017    BCR 20.0 04/25/2017    CO2 23.0 04/25/2017    CALCIUM 9.2 04/25/2017    ALBUMIN 4.30 04/25/2017    LABIL2 1.3 04/25/2017    AST 25 04/25/2017    ALT 36 04/25/2017                  A/P    Dizziness with long-standing diabetes and blood pressure being on the low side on Benicar 20 mg decrease the dose of Benicar to 10 mg daily.  We'll do a " tilt table test in about 2 weeks to rule out autonomic dysfunction.    Atheroembolic episode into the eye-nuclear stress and echo are unremarkable.  On good medical therapy with the dual antiplatelet therapy with aspirin and Plavix.  On statins.  Patient has not seen a neurologist and does not want to see a neurologist at this time    Diabetes on glimepiride and Januvia.      Follow-up in 6 months or earlier if the tilt table is abnormal          This document has been electronically signed by Satish Conte MD on May 25, 2018 9:37 AM       EMR Dragon/Transcription disclaimer:   Some of this note may be an electronic transcription/translation of spoken language to printed text. The electronic translation of spoken language may permit erroneous, or at times, nonsensical words or phrases to be inadvertently transcribed; Although I have reviewed the note for such errors, some may still exist.

## 2018-06-18 ENCOUNTER — HOSPITAL ENCOUNTER (OUTPATIENT)
Dept: CARDIOLOGY | Facility: HOSPITAL | Age: 78
Discharge: HOME | End: 2018-06-18
Attending: INTERNAL MEDICINE | Admitting: INTERNAL MEDICINE

## 2018-06-18 DIAGNOSIS — I10 BENIGN ESSENTIAL HYPERTENSION: ICD-10-CM

## 2018-06-18 DIAGNOSIS — R42 DIZZINESS: ICD-10-CM

## 2018-06-18 PROCEDURE — 93660 TILT TABLE EVALUATION: CPT

## 2018-06-18 PROCEDURE — 93660 TILT TABLE EVALUATION: CPT | Performed by: INTERNAL MEDICINE

## 2018-06-19 LAB
MAXIMAL PREDICTED HEART RATE: 144 BPM
STRESS TARGET HR: 122 BPM

## 2018-11-30 ENCOUNTER — OFFICE VISIT (OUTPATIENT)
Dept: CARDIOLOGY | Facility: CLINIC | Age: 78
End: 2018-11-30

## 2018-11-30 VITALS
BODY MASS INDEX: 25.18 KG/M2 | SYSTOLIC BLOOD PRESSURE: 110 MMHG | WEIGHT: 170 LBS | HEIGHT: 69 IN | DIASTOLIC BLOOD PRESSURE: 60 MMHG | HEART RATE: 55 BPM

## 2018-11-30 DIAGNOSIS — E11.9 DIABETES MELLITUS WITHOUT COMPLICATION (HCC): ICD-10-CM

## 2018-11-30 DIAGNOSIS — E78.00 PURE HYPERCHOLESTEROLEMIA: Primary | ICD-10-CM

## 2018-11-30 DIAGNOSIS — I10 ESSENTIAL HYPERTENSION: ICD-10-CM

## 2018-11-30 PROCEDURE — 99213 OFFICE O/P EST LOW 20 MIN: CPT | Performed by: INTERNAL MEDICINE

## 2018-11-30 RX ORDER — ATORVASTATIN CALCIUM 20 MG/1
20 TABLET, FILM COATED ORAL DAILY
COMMUNITY

## 2018-11-30 RX ORDER — FLUOXETINE HYDROCHLORIDE 40 MG/1
40 CAPSULE ORAL DAILY
COMMUNITY
End: 2021-10-12

## 2018-11-30 NOTE — PROGRESS NOTES
[facility]  OFFICE NOTE    David Chacon  77 y.o. male    11/30/2018  1. Pure hypercholesterolemia    2. Diabetes mellitus without complication (CMS/HCC)    3. Essential hypertension        Chief complaint -Dizziness      History of present Illness- 77-year-old gentleman who had an embolic event to his eye subsequently had a stress test and echo in 2017 which were unremarkable he had previous pericardiectomy in 1975 and there was no significant problems since and is not symptomatic.  He has features of autonomic dysfunction secondary to long-standing diabetes.  He is been tolerating Benicar 10 mg daily .  Asked him to make sure he takes plenty of fluids and wear compression stockings and gait modification.  His sugars have been doing okay and he follows with Dr. Victor              Allergies   Allergen Reactions   • Olmesartan Other (See Comments)     Patient had cough and dry skin to generic olmsartan. 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)    • Epiretinal membrane      OS, s/p PPV, doing well      • Exotropia     - intermittent, stable      • Glaucoma suspect    • Hyperlipidemia    • Nonproliferative diabetic retinopathy (CMS/HCC)     MILD   • Nuclear cataract    • Type 2 diabetes mellitus 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 (CMS/HCC)     EDEMA         Past Surgical History:   Procedure Laterality Date   • CARDIAC CATHETERIZATION     • CATARACT EXTRACTION EXTRACAPSULAR W/ INTRAOCULAR LENS IMPLANTATION     • LAMINOTOMY Left 01/06/1969    Exc HNP 5th lumbar   • OTHER SURGICAL HISTORY  05/31/2016    EXTENDED VISUAL FIELDS STUDY 32386 (Open angle with borderline findings, low risk,  unspecified eye   • OTHER SURGICAL HISTORY  04/26/2016    FINDINGS OF DIL MAC OR FUND EXAM COMM TO MD MayF (Type 2 diabetes mellitus 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   • OTHER SURGICAL HISTORY  04/26/2016    OCT DISC NFL 38072 (Type 2 diabetes mellitus 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 No pertinent family history.      Social History     Socioeconomic History   • Marital status:      Spouse name: Not on file   • Number of children: Not on file   • Years of education: Not on file   • Highest education level: Not on file   Social Needs   • Financial resource strain: Not on file   • Food insecurity - worry: Not on file   • Food insecurity - inability: Not on file   • Transportation needs - medical: Not on file   • Transportation needs - non-medical: Not on file   Occupational History   • Not on file   Tobacco Use   • Smoking status: Former Smoker   • Smokeless tobacco: Never Used   Substance and Sexual Activity   • Alcohol use: No   • Drug use: No   • Sexual activity: Defer   Other Topics Concern   • Not on file   Social History Narrative   • Not on file         Current Outpatient Medications   Medication Sig Dispense Refill   • aspirin 81 MG tablet Take 1 tablet by mouth Daily. 30 tablet 11   • atorvastatin (LIPITOR) 20 MG tablet Take 20 mg by mouth Daily.     • chlorhexidine (PERIDEX) 0.12 % solution Apply 15 mL to the mouth or throat 2 (Two) Times a Day.     • Cinnamon 500 MG tablet Take 1,000 mg by mouth 2 (Two) Times a Day.     • clopidogrel (PLAVIX) 75 MG tablet Take 75 mg by mouth Daily.     • FLUoxetine (PROzac) 40 MG capsule Take 40 mg by mouth Daily.     • folic acid (FOLVITE) 1 MG tablet Take 1 mg by mouth Daily.     • glimepiride (AMARYL) 4 MG tablet  "Take 4 mg by mouth Every Morning Before Breakfast.     • latanoprost (XALATAN) 0.005 % ophthalmic solution ADMINISTER 1 DROP TO BOTH EYES EVERY NIGHT. 2.5 mL 12   • olmesartan (BENICAR) 20 MG tablet Take 0.5 tablets by mouth Daily. 90 tablet 5   • Omega-3 Fatty Acids (FISH OIL) 1000 MG capsule capsule Take  by mouth Daily With Breakfast.     • sitaGLIPtin (JANUVIA) 100 MG tablet Take 100 mg by mouth Daily.     • timolol (TIMOPTIC) 0.5 % ophthalmic solution Administer 1 drop to both eyes Take As Directed.       No current facility-administered medications for this visit.                    OBJECTIVE    /60   Pulse 55   Ht 175.3 cm (69.02\")   Wt 77.1 kg (170 lb)   BMI 25.09 kg/m²       Physical Exam     Constitutional: is oriented to person, place, and time.     Skin-warm and dry    Well developed and nourished in no acute distress      Head: Normocephalic and atraumatic.     Eyes: Pupils are equal,    Neck: Neck supple. No bruit in the carotids,     Cardiovascular: [place] in the fifth intercostal space   Regular rate, and  Rhythm,    S1 greater than S2, no S3 or S4, no gallop     Pulmonary/Chest:   Air  Entry is equal on both sides  No wheezing or crackles,      Abdominal: Soft.  No hepatosplenomegaly, bowel sounds are present    Musculoskeletal: No kyphoscoliosis, no significant thickening of the joints    Neurological: is alert and oriented to person, place, and time.    cranial nerve are intact .   No motor or sensory deficit    Extremities-no edema, no radial femoral delay      Psychiatric: He has a normal mood and affect.                  His behavior is normal.           Procedures      Lab Results   Component Value Date    WBC 6.24 04/25/2017    HGB 12.7 (L) 04/25/2017    HCT 37.4 (L) 04/25/2017    MCV 91.2 04/25/2017     04/25/2017     Lab Results   Component Value Date    GLUCOSE 193 (H) 04/25/2017    BUN 39 (H) 04/25/2017    CREATININE 1.95 (H) 04/25/2017    EGFRIFNONA 34 (L) 04/25/2017    " BCR 20.0 04/25/2017    CO2 23.0 04/25/2017    CALCIUM 9.2 04/25/2017    ALBUMIN 4.30 04/25/2017    AST 25 04/25/2017    ALT 36 04/25/2017                  A/P    Dizziness with long-standing diabetes and blood pressure is ok on Benicar 10 mg. he had no episodes of drop in blood pressure and his tilt table test was negative    Atheroembolic episode into the eye-nuclear stress and echo are unremarkable.  On good medical therapy with the dual antiplatelet therapy with aspirin and Plavix.  On statins.  Patient has not seen a neurologist and does not want to see a neurologist     Diabetes on glimepiride and Januvia.  Follows with Dr. Victor    Follow-up in 6 months           This document has been electronically signed by Satish Conte MD on November 30, 2018 9:20 AM       EMR Dragon/Transcription disclaimer:   Some of this note may be an electronic transcription/translation of spoken language to printed text. The electronic translation of spoken language may permit erroneous, or at times, nonsensical words or phrases to be inadvertently transcribed; Although I have reviewed the note for such errors, some may still exist.

## 2018-12-17 RX ORDER — CLOPIDOGREL BISULFATE 75 MG/1
TABLET ORAL
Qty: 90 TABLET | Refills: 3 | OUTPATIENT
Start: 2018-12-17

## 2018-12-17 NOTE — TELEPHONE ENCOUNTER
Patient has not been seen by Dr Hamman since 2017. Refill denied.  Requested Prescriptions     Pending Prescriptions Disp Refills   • clopidogrel (PLAVIX) 75 MG tablet [Pharmacy Med Name: CLOPIDOGREL  TAB 75MG] 90 tablet 3     Sig: TAKE 1 TABLET DAILY

## 2018-12-21 DIAGNOSIS — E11.9 DIABETES MELLITUS WITHOUT COMPLICATION (HCC): ICD-10-CM

## 2018-12-21 DIAGNOSIS — N18.4 CHRONIC KIDNEY DISEASE, STAGE 4 (SEVERE) (HCC): ICD-10-CM

## 2018-12-21 DIAGNOSIS — I20.9 ANGINA PECTORIS (HCC): ICD-10-CM

## 2018-12-21 RX ORDER — CLOPIDOGREL BISULFATE 75 MG/1
75 TABLET ORAL DAILY
Qty: 30 TABLET | Refills: 11 | Status: SHIPPED | OUTPATIENT
Start: 2018-12-21

## 2018-12-31 DIAGNOSIS — I65.23 BILATERAL CAROTID ARTERY STENOSIS: Primary | ICD-10-CM

## 2019-01-02 ENCOUNTER — OFFICE VISIT (OUTPATIENT)
Dept: CARDIAC SURGERY | Facility: CLINIC | Age: 79
End: 2019-01-02

## 2019-01-02 VITALS
SYSTOLIC BLOOD PRESSURE: 118 MMHG | TEMPERATURE: 98.4 F | WEIGHT: 175 LBS | BODY MASS INDEX: 25.92 KG/M2 | HEART RATE: 53 BPM | OXYGEN SATURATION: 97 % | DIASTOLIC BLOOD PRESSURE: 60 MMHG | HEIGHT: 69 IN

## 2019-01-02 DIAGNOSIS — I65.22 ASYMPTOMATIC STENOSIS OF LEFT CAROTID ARTERY: Primary | ICD-10-CM

## 2019-01-02 PROBLEM — I20.9 ANGINA PECTORIS (HCC): Status: ACTIVE | Noted: 2019-01-02

## 2019-01-02 PROBLEM — N18.4 CHRONIC KIDNEY DISEASE, STAGE 4 (SEVERE) (HCC): Status: ACTIVE | Noted: 2019-01-02

## 2019-01-02 PROCEDURE — 99212 OFFICE O/P EST SF 10 MIN: CPT | Performed by: THORACIC SURGERY (CARDIOTHORACIC VASCULAR SURGERY)

## 2019-01-06 PROBLEM — I65.29 CAROTID STENOSIS, ASYMPTOMATIC: Status: ACTIVE | Noted: 2017-04-07

## 2019-01-06 NOTE — PROGRESS NOTES
David Chacon  1940            78 y.o.      1/2/2019    Chief Complaint   Patient presents with   • Carotid Artery Disease     1 year f/u   PCP TINO TESFAYE MD  Chief Complaint   Patient presents with   • Carotid Artery Disease       6 month follow up    Please recall that Mr. David Chacon 76-year-old man was seen by Dr. Henrry Ramirez on 4/7/17 with sudden visual loss left eye.  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  When first seen by me on 4/25/17 vision had almost returned her pre-episode status except for 1 small portion of the left visual field.  Carotid duplex scan at that time demonstrated 0-49% stenosis bilaterally with antegrade flow in both vertebral arteries.  The patient had not experienced stroke or TIA symptoms and has had no visual or central nervous system symptoms since having been seen in April 2017 he had a CTA was considered but canceled with a relatively normal carotid duplex scan and a GFR of 34.  We wanted to avoid contrast exposure with the finding of CKD stage 3.           HPI the patient has since undergone bilateral cataract extraction.  Vision is quite satisfactory and he has experienced no central nervous system OR VISUAL  symptoms whatsoever     ROS  Back problems, diabetes mellitus, hypertension and above-mentioned stage III chronic kidney disease                  HPI        ROS       Current Outpatient Medications:   •  aspirin 81 MG tablet, Take 1 tablet by mouth Daily., Disp: 30 tablet, Rfl: 11  •  atorvastatin (LIPITOR) 20 MG tablet, Take 20 mg by mouth Daily., Disp: , Rfl:   •  chlorhexidine (PERIDEX) 0.12 % solution, Apply 15 mL to the mouth or throat 2 (Two) Times a Day., Disp: , Rfl:   •  Cinnamon 500 MG tablet, Take 1,000 mg by mouth 2 (Two) Times a Day., Disp: , Rfl:   •  clopidogrel (PLAVIX) 75 MG tablet, Take 1 tablet by mouth Daily., Disp: 30 tablet, Rfl: 11  •  FLUoxetine (PROzac) 40 MG capsule, Take 40 mg by mouth Daily., Disp:  ", Rfl:   •  folic acid (FOLVITE) 1 MG tablet, Take 1 mg by mouth Daily., Disp: , Rfl:   •  glimepiride (AMARYL) 4 MG tablet, Take 4 mg by mouth Every Morning Before Breakfast., Disp: , Rfl:   •  latanoprost (XALATAN) 0.005 % ophthalmic solution, ADMINISTER 1 DROP TO BOTH EYES EVERY NIGHT., Disp: 2.5 mL, Rfl: 12  •  olmesartan (BENICAR) 20 MG tablet, Take 0.5 tablets by mouth Daily., Disp: 90 tablet, Rfl: 5  •  Omega-3 Fatty Acids (FISH OIL) 1000 MG capsule capsule, Take  by mouth Daily With Breakfast., Disp: , Rfl:   •  sitaGLIPtin (JANUVIA) 100 MG tablet, Take 100 mg by mouth Daily., Disp: , Rfl:   •  timolol (TIMOPTIC) 0.5 % ophthalmic solution, Administer 1 drop to both eyes Take As Directed., Disp: , Rfl:     The following portions of the patient's history were reviewed and updated as appropriate: allergies, current medications, past family history, past medical history, past social history, past surgical history and problem list.        Past Surgical History:   Procedure Laterality Date   • CARDIAC CATHETERIZATION     • CATARACT EXTRACTION EXTRACAPSULAR W/ INTRAOCULAR LENS IMPLANTATION     • LAMINOTOMY Left 01/06/1969    Exc HNP 5th lumbar   • OTHER SURGICAL HISTORY  05/31/2016    EXTENDED VISUAL FIELDS STUDY 02307 (Open angle with borderline findings, low risk, unspecified eye   • OTHER SURGICAL HISTORY  04/26/2016    FINDINGS OF DIL MAC OR FUND EXAM COMM TO MD 5010F (Type 2 diabetes mellitus 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   • OTHER SURGICAL HISTORY  04/26/2016    OCT DISC NFL 11465 (Type 2 diabetes mellitus 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)    • OTHER SURGICAL HISTORY  09/30/2015    OCT SCAN RETINA 95835 (Epiretinal membrane   • PERICARDIECTOMY  08/04/1972    Pericarditis etiol undetermined           Physical Exam  /60 (BP Location: Left arm)   Pulse 53   Temp 98.4 °F (36.9 °C) (Temporal)   Ht 175.3 cm (69\")   Wt 79.4 kg " (175 lb)   SpO2 97%   BMI 25.84 kg/m²     HEENT: No masses or bruits    CHEST: Clear to auscultation    HEART: Regular rate and rhythm    ABDOMEN: Nontender    EXTREMITIES: Has palpable posterior tibial arteries bilaterally     VASCULAR LAB STUDIES:CAROTID DUPLEX SCAN (1-2-19)                                                  RIGHT 0-49%      LEFT 0-49%  Vertebral Flow                      Antegrade            Antegrade            RADIOLOGY:      Asymptomatic stenosis of left carotid artery [I65.22]    IMPRESSION:  No significant advance carotid symptoms.                Assessment/Plan  David was seen today for carotid artery disease.    Diagnoses and all orders for this visit:    Asymptomatic stenosis of left carotid artery                  Return 2 years carotid duplex scan.            Jack L. Hamman, MD  1/6/2019

## 2019-05-31 ENCOUNTER — OFFICE VISIT (OUTPATIENT)
Dept: CARDIOLOGY | Facility: CLINIC | Age: 79
End: 2019-05-31

## 2019-05-31 VITALS
HEIGHT: 69 IN | WEIGHT: 175 LBS | OXYGEN SATURATION: 98 % | HEART RATE: 51 BPM | SYSTOLIC BLOOD PRESSURE: 92 MMHG | BODY MASS INDEX: 25.92 KG/M2 | DIASTOLIC BLOOD PRESSURE: 60 MMHG

## 2019-05-31 DIAGNOSIS — E11.9 TYPE 2 DIABETES MELLITUS WITHOUT COMPLICATION, WITHOUT LONG-TERM CURRENT USE OF INSULIN (HCC): ICD-10-CM

## 2019-05-31 DIAGNOSIS — I10 ESSENTIAL HYPERTENSION: Primary | ICD-10-CM

## 2019-05-31 DIAGNOSIS — E78.00 PURE HYPERCHOLESTEROLEMIA: ICD-10-CM

## 2019-05-31 DIAGNOSIS — N18.30 STAGE 3 CHRONIC KIDNEY DISEASE (HCC): ICD-10-CM

## 2019-05-31 PROCEDURE — 99214 OFFICE O/P EST MOD 30 MIN: CPT | Performed by: INTERNAL MEDICINE

## 2019-05-31 RX ORDER — MEMANTINE HYDROCHLORIDE 7 MG/1
7 CAPSULE, EXTENDED RELEASE ORAL DAILY
COMMUNITY
End: 2019-05-31

## 2019-05-31 RX ORDER — MEMANTINE HYDROCHLORIDE 7 MG/1
7 CAPSULE, EXTENDED RELEASE ORAL DAILY
COMMUNITY
End: 2020-01-30

## 2019-05-31 NOTE — PROGRESS NOTES
[facility]  OFFICE NOTE    David Chacon  78 y.o. male    05/31/2019  1. Essential hypertension    2. Pure hypercholesterolemia    3. Type 2 diabetes mellitus without complication, without long-term current use of insulin (CMS/HCC)    4. Stage 3 chronic kidney disease (CMS/HCC)        Chief complaint -chronic dizziness due to autonomic dysfunction      History of present Illness- 78-year-old gentleman who had an embolic event to his eye in the past, he had ischemic work-up with stress test and echo in 2017 which were unremarkable he had previous pericardiectomy in 1975 and there was no significant problems since and is not symptomatic.  He has features of autonomic dysfunction secondary to long-standing diabetes.  He is been tolerating Benicar 10 mg daily .  Asked him to make sure he takes plenty of fluids and wear compression stockings and gait modification.  Does have some memory problems and is on Namenda  ,his sugars have been doing okay and he follows with Dr. Victor              Allergies   Allergen Reactions   • Olmesartan Other (See Comments)     Patient had cough and dry skin to generic olmsartan. 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)    • Epiretinal membrane      OS, s/p PPV, doing well      • Exotropia     - intermittent, stable      • Glaucoma suspect    • Hyperlipidemia    • Nonproliferative diabetic retinopathy (CMS/HCC)     MILD   • Nuclear cataract    • Type 2 diabetes mellitus 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 (CMS/HCC)     EDEMA         Past Surgical History:   Procedure Laterality Date   • CARDIAC CATHETERIZATION      • CATARACT EXTRACTION EXTRACAPSULAR W/ INTRAOCULAR LENS IMPLANTATION     • LAMINOTOMY Left 01/06/1969    Exc HNP 5th lumbar   • OTHER SURGICAL HISTORY  05/31/2016    EXTENDED VISUAL FIELDS STUDY 58361 (Open angle with borderline findings, low risk, unspecified eye   • OTHER SURGICAL HISTORY  04/26/2016    FINDINGS OF DIL MAC OR FUND EXAM COMM TO MD CASILLAS (Type 2 diabetes mellitus 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   • OTHER SURGICAL HISTORY  04/26/2016    OCT DISC NFL 35148 (Type 2 diabetes mellitus 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)    • OTHER SURGICAL HISTORY  09/30/2015    OCT SCAN RETINA 83125 (Epiretinal membrane   • PERICARDIECTOMY  08/04/1972    Pericarditis etiol undetermined         No family history on file.      Social History     Socioeconomic History   • Marital status:      Spouse name: Not on file   • Number of children: Not on file   • Years of education: Not on file   • Highest education level: Not on file   Tobacco Use   • Smoking status: Former Smoker   • Smokeless tobacco: Never Used   Substance and Sexual Activity   • Alcohol use: No   • Drug use: No   • Sexual activity: Defer         Current Outpatient Medications   Medication Sig Dispense Refill   • aspirin 81 MG tablet Take 1 tablet by mouth Daily. 30 tablet 11   • atorvastatin (LIPITOR) 20 MG tablet Take 20 mg by mouth Daily.     • chlorhexidine (PERIDEX) 0.12 % solution Apply 15 mL to the mouth or throat 2 (Two) Times a Day.     • Cinnamon 500 MG tablet Take 1,000 mg by mouth 2 (Two) Times a Day.     • clopidogrel (PLAVIX) 75 MG tablet Take 1 tablet by mouth Daily. 30 tablet 11   • FLUoxetine (PROzac) 40 MG capsule Take 40 mg by mouth Daily.     • folic acid (FOLVITE) 1 MG tablet Take 1 mg by mouth Daily.     • glimepiride (AMARYL) 4 MG tablet Take 4 mg by mouth Every Morning Before Breakfast.     • latanoprost (XALATAN) 0.005 % ophthalmic solution ADMINISTER 1  "DROP TO BOTH EYES EVERY NIGHT. 2.5 mL 12   • memantine (NAMENDA XR) 7 MG capsule sustained-release 24 hr extended release capsule Take 7 mg by mouth Daily.     • olmesartan (BENICAR) 20 MG tablet Take 0.5 tablets by mouth Daily. 90 tablet 5   • Omega-3 Fatty Acids (FISH OIL) 1000 MG capsule capsule Take  by mouth Daily With Breakfast.     • sitaGLIPtin (JANUVIA) 100 MG tablet Take 100 mg by mouth Daily.     • timolol (TIMOPTIC) 0.5 % ophthalmic solution Administer 1 drop to both eyes Take As Directed.       No current facility-administered medications for this visit.                    OBJECTIVE    BP 92/60   Pulse 51   Ht 175.3 cm (69.02\")   Wt 79.4 kg (175 lb)   SpO2 98%   BMI 25.83 kg/m²       Physical Exam     Constitutional: is oriented to person, place, and time.     Skin-warm and dry    Well developed and nourished in no acute distress      Head: Normocephalic and atraumatic.     Eyes: Pupils are equal,    Neck: Neck supple. No bruit in the carotids,     Cardiovascular: [place] in the fifth intercostal space   Regular rate, and  Rhythm,    S1 greater than S2, no S3 or S4, no gallop     Pulmonary/Chest:   Air  Entry is equal on both sides  No wheezing or crackles,      Abdominal: Soft.  No hepatosplenomegaly, bowel sounds are present    Musculoskeletal: No kyphoscoliosis, no significant thickening of the joints    Neurological: is alert and oriented to person, place, and time.    cranial nerve are intact .   No motor or sensory deficit    Extremities-no edema, no radial femoral delay      Psychiatric: He has a normal mood and affect.                  His behavior is normal.           Procedures      Lab Results   Component Value Date    WBC 6.24 04/25/2017    HGB 12.7 (L) 04/25/2017    HCT 37.4 (L) 04/25/2017    MCV 91.2 04/25/2017     04/25/2017     Lab Results   Component Value Date    GLUCOSE 193 (H) 04/25/2017    BUN 31 (H) 03/18/2019    CREATININE 1.9 (H) 03/18/2019    EGFRIFNONA 34 (L) " 04/25/2017    BCR 20.0 04/25/2017    CO2 23.0 04/25/2017    CALCIUM 8.8 03/18/2019    ALBUMIN 3.4 03/18/2019    AST 13 (L) 03/18/2019    ALT 21 (L) 03/18/2019                  A/P    Dizziness with long-standing diabetes and blood pressure is ok on Benicar 10 mg. he had no episodes of drop in blood pressure and his tilt table test was negative.  I asked him to keep himself well-hydrated    Atheroembolic episode into the eye-nuclear stress and echo are unremarkable.  On good medical therapy with the dual antiplatelet therapy with aspirin and Plavix.  On statins.  Patient has not seen a neurologist and does not want to see a neurologist     Diabetes on glimepiride and Januvia.  Follows with Dr. Victor    Follow-up in 8 months           This document has been electronically signed by Satish Conte MD on May 31, 2019 10:25 AM       EMR Dragon/Transcription disclaimer:   Some of this note may be an electronic transcription/translation of spoken language to printed text. The electronic translation of spoken language may permit erroneous, or at times, nonsensical words or phrases to be inadvertently transcribed; Although I have reviewed the note for such errors, some may still exist.

## 2020-01-30 ENCOUNTER — OFFICE VISIT (OUTPATIENT)
Dept: CARDIOLOGY | Facility: CLINIC | Age: 80
End: 2020-01-30

## 2020-01-30 VITALS
OXYGEN SATURATION: 99 % | SYSTOLIC BLOOD PRESSURE: 130 MMHG | DIASTOLIC BLOOD PRESSURE: 76 MMHG | WEIGHT: 179 LBS | HEIGHT: 69 IN | BODY MASS INDEX: 26.51 KG/M2 | HEART RATE: 54 BPM

## 2020-01-30 DIAGNOSIS — N18.4 CHRONIC KIDNEY DISEASE, STAGE 4 (SEVERE) (HCC): ICD-10-CM

## 2020-01-30 DIAGNOSIS — I25.10 ATHEROSCLEROSIS OF NATIVE CORONARY ARTERY OF NATIVE HEART WITHOUT ANGINA PECTORIS: ICD-10-CM

## 2020-01-30 DIAGNOSIS — E78.2 MIXED HYPERLIPIDEMIA: Primary | ICD-10-CM

## 2020-01-30 DIAGNOSIS — Z98.890 S/P PERICARDIAL OPERATION: ICD-10-CM

## 2020-01-30 DIAGNOSIS — I10 ESSENTIAL HYPERTENSION: ICD-10-CM

## 2020-01-30 DIAGNOSIS — I10 ESSENTIAL HYPERTENSION: Primary | ICD-10-CM

## 2020-01-30 PROCEDURE — 99214 OFFICE O/P EST MOD 30 MIN: CPT | Performed by: INTERNAL MEDICINE

## 2020-01-31 NOTE — PROGRESS NOTES
David Chacon  79 y.o. male    01/30/2020  1. Mixed hyperlipidemia    2. Essential hypertension    3. S/P pericardial operation    4. Chronic kidney disease, stage 4 (severe) (CMS/HCC)    5. Atherosclerosis of native coronary artery of native heart without angina pectoris        History of Present Illness  Mr. Chacon is a 79-year-old male who is being seen by me for the first time.  He was a patient of Dr. Conte in the past.  His remarkable for evaluation of chest pain back in 2006 when cardiac catheterization showed disease in a small PLV branch distally.  Large epicardial vessels were widely patent.  He has been treated medically.  His other medical issues include hypertension, hyperlipidemia and remote history of pericardial surgery in the 1970s.  He is known to have CKD.  He has diabetes related endorgan damage and peripheral neuropathy and orthostasis.  He did report occasional twinges in the left side of the chest that seems last just for a few seconds and this has atypical features.  His last stress test was in May 2017 and this showed no reversible ischemia.  He has been compliant with his medications.    SUBJECTIVE    Allergies   Allergen Reactions   • Olmesartan Other (See Comments)     Patient had cough and dry skin to generic olmsartan. 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 edema (CMS/HCC)     EDEMA         Past Surgical History:   Procedure Laterality Date   • CARDIAC CATHETERIZATION     • CATARACT EXTRACTION EXTRACAPSULAR W/ INTRAOCULAR LENS IMPLANTATION     • LAMINOTOMY Left 01/06/1969    Exc HNP 5th lumbar   • OTHER SURGICAL HISTORY  05/31/2016    EXTENDED VISUAL FIELDS STUDY 82208 (Open angle with borderline findings, low risk, unspecified eye   • OTHER SURGICAL HISTORY  04/26/2016    FINDINGS OF DIL MAC OR FUND EXAM COMM TO MD MayF (Type 2 diabetes mellitus 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   • OTHER SURGICAL HISTORY  04/26/2016    OCT DISC NFL 94878 (Type 2 diabetes mellitus 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)    • OTHER SURGICAL HISTORY  09/30/2015    OCT SCAN RETINA 34692 (Epiretinal membrane   • PERICARDIECTOMY  08/04/1972    Pericarditis etiol undetermined         History reviewed. No pertinent family history.      Social History     Socioeconomic History   • Marital status:      Spouse name: Not on file   • Number of children: Not on file   • Years of education: Not on file   • Highest education level: Not on file   Tobacco Use   • Smoking status: Former Smoker   • Smokeless tobacco: Never Used   Substance and Sexual Activity   • Alcohol use: No   • Drug use: No   • Sexual activity: Defer         Current Outpatient Medications   Medication Sig Dispense Refill   • aspirin 81 MG tablet Take 1 tablet by mouth Daily. 30 tablet 11   • atorvastatin (LIPITOR) 20 MG tablet Take 20 mg by mouth Daily.     • chlorhexidine (PERIDEX) 0.12 % solution Apply 15 mL to the mouth or throat 2 (Two) Times a Day.     • Cinnamon 500 MG tablet Take 1,000 mg by mouth 2 (Two) Times a Day.     • clopidogrel (PLAVIX) 75 MG tablet Take 1 tablet by mouth Daily. 30 tablet 11   • FLUoxetine (PROzac) 40 MG capsule Take 40 mg by mouth Daily.     • folic acid (FOLVITE) 1 MG tablet Take 1 mg by mouth Daily.     •  "glimepiride (AMARYL) 4 MG tablet Take 4 mg by mouth Every Morning Before Breakfast.     • latanoprost (XALATAN) 0.005 % ophthalmic solution ADMINISTER 1 DROP TO BOTH EYES EVERY NIGHT. 2.5 mL 12   • olmesartan (BENICAR) 20 MG tablet Take 0.5 tablets by mouth Daily. 90 tablet 5   • Omega-3 Fatty Acids (FISH OIL) 1000 MG capsule capsule Take  by mouth Daily With Breakfast.     • sitaGLIPtin (JANUVIA) 100 MG tablet Take 100 mg by mouth Daily.     • timolol (TIMOPTIC) 0.5 % ophthalmic solution Administer 1 drop to both eyes Take As Directed.     • memantine (NAMENDA XR) 7 MG capsule sustained-release 24 hr extended release capsule Take 7 mg by mouth Daily.       No current facility-administered medications for this visit.          OBJECTIVE    /76   Pulse 54   Ht 175.3 cm (69.02\")   Wt 81.2 kg (179 lb)   SpO2 99%   BMI 26.42 kg/m²         Review of Systems     Constitutional:  Denies recent weight loss, weight gain, fever or chills     HENT:  Denies any hearing loss, epistaxis, hoarseness, or difficulty speaking.     Eyes: Wears eyeglasses or contact lenses     Respiratory:  Denies dyspnea with exertion,no cough, wheezing, or hemoptysis.     Cardiovascular: Negative for palpations, orthopnea, PND, peripheral edema.  Intermittent sharp chest pain lasting few seconds at a time    Gastrointestinal:  Denies change in bowel habits, dyspepsia, ulcer disease, hematochezia, or melena.     Endocrine: Negative for cold intolerance, heat intolerance, polydipsia, polyphagia and polyuria.     Genitourinary: CKD      Musculoskeletal: Denies any history of arthritic symptoms or back problems     Skin:  Denies any change in hair or nails, rashes, or skin lesions.     Allergic/Immunologic: Negative.  Negative for environmental allergies, food allergies and immunocompromised state.     Neurological:  Denies any history of recurrent headaches, strokes, TIA, or seizure disorder.     Hematological: Denies any food allergies, " seasonal allergies, bleeding disorders, or lymphadenopathy.     Psychiatric/Behavioral: Denies any history of depression, substance abuse, or change in cognitive function.         Physical Exam     Constitutional: Cooperative, alert and oriented, in no acute distress.     HENT:   Head: Normocephalic, normal hair patterns, no masses or tenderness.  Ears, Nose, and Throat: No gross abnormalities. No pallor or cyanosis.   Eyes: EOMS intact, PERRL, conjunctivae and lids unremarkable. Fundoscopic exam and visual fields not performed.   Neck: No palpable masses or adenopathy, no thyromegaly, no JVD, carotid pulses are full and equal bilaterally and without  Bruits.     Cardiovascular: Regular rhythm, S1 and S2 normal, no S3 or S4.  No murmurs, gallops, or rubs detected.     Pulmonary/Chest: Chest: normal symmetry, normal respiratory excursion, no intercostal retraction, no use of accessory muscles.            Pulmonary: Normal breath sounds. No rales or ronchi.    Abdominal: Abdomen soft, bowel sounds normoactive, no masses, no hepatosplenomegaly, non-tender, no bruits.     Musculoskeletal: No deformities, clubbing, cyanosis, erythema, or edema observed.     Neurological: No gross motor or sensory deficits noted, affect appropriate, oriented to time, person, place.     Skin: Warm and dry to the touch, no apparent skin lesions or masses noted.     Psychiatric: He has a normal mood and affect. His behavior is normal. Judgment and thought content normal.         Procedures      Lab Results   Component Value Date    WBC 8.6 11/16/2018    HGB 14.3 (L) 11/16/2018    HCT 45.2 11/16/2018    MCV 96 (H) 11/16/2018     11/16/2018     Lab Results   Component Value Date    GLUCOSE 193 (H) 04/25/2017    BUN 30 (H) 09/24/2019    CREATININE 1.9 (H) 09/24/2019    EGFRIFNONA 34 (L) 04/25/2017    BCR 20.0 04/25/2017    CO2 23.0 04/25/2017    CALCIUM 9.1 09/24/2019    ALBUMIN 3.6 09/24/2019    AST 16 09/24/2019    ALT 18 09/24/2019      Lab Results   Component Value Date    CHOL 197 09/24/2019    CHOL 174 03/18/2019    CHOL 210 (H) 11/16/2018     Lab Results   Component Value Date    TRIG 174 (H) 09/24/2019    TRIG 81 03/18/2019    TRIG 174 11/16/2018     Lab Results   Component Value Date    HDL 51 09/24/2019    HDL 51 03/18/2019    HDL 47 11/16/2018     No components found for: LDLCALC  Lab Results   Component Value Date     (H) 09/24/2019     (H) 03/18/2019     (H) 11/16/2018     No results found for: HDLLDLRATIO  No components found for: CHOLHDL  Lab Results   Component Value Date    HGBA1C 7 (H) 09/24/2019     No results found for: TSH, K4EZXXI, H5FQUBC, THYROIDAB        ASSESSMENT AND PLAN  Mrs. Chacon has multiple medical issues as discussed in the history of present illness but is stable from a cardiac standpoint with no clinical evidence of definite angina, arrhythmia or congestive heart failure.  I have continued his present antiplatelet therapy with aspirin and Plavix, lipid-lowering therapy with atorvastatin, antihypertensive therapy with olmesartan.    About 40 minutes was spent in the assessment and evaluation of this patient.    David was seen today for follow-up.    Diagnoses and all orders for this visit:    Mixed hyperlipidemia    Essential hypertension    S/P pericardial operation    Chronic kidney disease, stage 4 (severe) (CMS/HCC)    Atherosclerosis of native coronary artery of native heart without angina pectoris        Patient's Body mass index is 26.42 kg/m². BMI is above normal parameters. Recommendations include: exercise counseling and nutrition counseling.      David Chacon  reports that he has quit smoking. He has never used smokeless tobacco..      Janeen Blevins MD  1/30/2020  8:00 PM

## 2020-08-05 ENCOUNTER — OFFICE VISIT (OUTPATIENT)
Dept: CARDIOLOGY | Facility: CLINIC | Age: 80
End: 2020-08-05

## 2020-08-05 VITALS
SYSTOLIC BLOOD PRESSURE: 118 MMHG | DIASTOLIC BLOOD PRESSURE: 56 MMHG | WEIGHT: 176.6 LBS | OXYGEN SATURATION: 97 % | HEART RATE: 54 BPM | BODY MASS INDEX: 26.16 KG/M2 | HEIGHT: 69 IN

## 2020-08-05 DIAGNOSIS — E78.2 MIXED HYPERLIPIDEMIA: ICD-10-CM

## 2020-08-05 DIAGNOSIS — I25.10 ATHEROSCLEROSIS OF NATIVE CORONARY ARTERY OF NATIVE HEART WITHOUT ANGINA PECTORIS: ICD-10-CM

## 2020-08-05 DIAGNOSIS — I10 ESSENTIAL HYPERTENSION: Primary | ICD-10-CM

## 2020-08-05 PROCEDURE — 99213 OFFICE O/P EST LOW 20 MIN: CPT | Performed by: INTERNAL MEDICINE

## 2020-08-05 PROCEDURE — 93000 ELECTROCARDIOGRAM COMPLETE: CPT | Performed by: INTERNAL MEDICINE

## 2020-08-05 RX ORDER — IRBESARTAN 75 MG/1
75 TABLET ORAL NIGHTLY
COMMUNITY

## 2020-08-05 NOTE — PROGRESS NOTES
David Chacon  79 y.o. male    1. Essential hypertension    2. Mixed hyperlipidemia    3. Atherosclerosis of native coronary artery of native heart without angina pectoris        History of Present Illness  Mr. Chacon is a 79-year-old male with cardiac catheterization in 2006 which showed disease in a small PLV branch distally.  Large epicardial vessels were widely patent.  He has been treated medically.  His other medical issues include hypertension, hyperlipidemia and remote history of pericardial surgery in the 1970s.  He is known to have CKD.  He has diabetes related end organ damage and peripheral neuropathy and orthostasis.    He has done well and denied any chest pain, shortness of breath, palpitation, dizziness or syncope.  He has been physically quite active without any restrictions.    EKG today showed sinus rhythm with heart rate of 54 bpm, nonspecific T wave changes were noted.    Allergies   Allergen Reactions   • Olmesartan Other (See Comments)     Patient had cough and dry skin to generic olmsartan. 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 (CMS/HCC)     EDEMA         Past Surgical History:   Procedure Laterality Date   • CARDIAC CATHETERIZATION     • CATARACT EXTRACTION EXTRACAPSULAR W/ INTRAOCULAR LENS IMPLANTATION     • LAMINOTOMY Left 01/06/1969    Exc HNP 5th  lumbar   • OTHER SURGICAL HISTORY  05/31/2016    EXTENDED VISUAL FIELDS STUDY 78351 (Open angle with borderline findings, low risk, unspecified eye   • OTHER SURGICAL HISTORY  04/26/2016    FINDINGS OF DIL MAC OR FUND EXAM COMM TO MD CASILLAS (Type 2 diabetes mellitus 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   • OTHER SURGICAL HISTORY  04/26/2016    OCT DISC NFL 83760 (Type 2 diabetes mellitus 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)    • OTHER SURGICAL HISTORY  09/30/2015    OCT SCAN RETINA 11513 (Epiretinal membrane   • PERICARDIECTOMY  08/04/1972    Pericarditis etiol undetermined         History reviewed. No pertinent family history.      Social History     Socioeconomic History   • Marital status:      Spouse name: Not on file   • Number of children: Not on file   • Years of education: Not on file   • Highest education level: Not on file   Tobacco Use   • Smoking status: Former Smoker   • Smokeless tobacco: Never Used   Substance and Sexual Activity   • Alcohol use: No   • Drug use: No   • Sexual activity: Defer         Current Outpatient Medications   Medication Sig Dispense Refill   • aspirin 81 MG tablet Take 1 tablet by mouth Daily. 30 tablet 11   • atorvastatin (LIPITOR) 20 MG tablet Take 20 mg by mouth Daily.     • chlorhexidine (PERIDEX) 0.12 % solution Apply 15 mL to the mouth or throat 2 (Two) Times a Day.     • Cinnamon 500 MG tablet Take 1,000 mg by mouth 2 (Two) Times a Day.     • clopidogrel (PLAVIX) 75 MG tablet Take 1 tablet by mouth Daily. 30 tablet 11   • FLUoxetine (PROzac) 40 MG capsule Take 40 mg by mouth Daily.     • folic acid (FOLVITE) 1 MG tablet Take 1 mg by mouth Daily.     • glimepiride (AMARYL) 4 MG tablet Take 4 mg by mouth Every Morning Before Breakfast.     • irbesartan (AVAPRO) 75 MG tablet Take 75 mg by mouth Every Night.     • latanoprost (XALATAN) 0.005 % ophthalmic solution ADMINISTER 1 DROP TO BOTH EYES EVERY  "NIGHT. 2.5 mL 12   • Omega-3 Fatty Acids (FISH OIL) 1000 MG capsule capsule Take  by mouth Daily With Breakfast.     • sitaGLIPtin (JANUVIA) 100 MG tablet Take 100 mg by mouth Daily.     • timolol (TIMOPTIC) 0.5 % ophthalmic solution Administer 1 drop to both eyes Take As Directed.       No current facility-administered medications for this visit.          OBJECTIVE    /56 (BP Location: Left arm, Patient Position: Sitting, Cuff Size: Adult)   Pulse 54   Ht 175.3 cm (69\")   Wt 80.1 kg (176 lb 9.6 oz)   SpO2 97%   BMI 26.08 kg/m²         Review of Systems     Constitutional:  Denies recent weight loss, weight gain, fever or chills     HENT:  Denies any hearing loss, epistaxis, hoarseness, or difficulty speaking.     Eyes: Wears eyeglasses or contact lenses     Respiratory:  Denies dyspnea with exertion,no cough, wheezing, or hemoptysis.     Cardiovascular: Negative for palpations, orthopnea, PND, peripheral edema.  Intermittent sharp chest pain lasting few seconds at a time    Gastrointestinal:  Denies change in bowel habits, dyspepsia, ulcer disease, hematochezia, or melena.     Endocrine: Negative for cold intolerance, heat intolerance, polydipsia, polyphagia and polyuria.     Genitourinary: CKD      Musculoskeletal: Denies any history of arthritic symptoms or back problems     Skin:  Denies any change in hair or nails, rashes, or skin lesions.     Allergic/Immunologic: Negative.  Negative for environmental allergies, food allergies and immunocompromised state.     Neurological:  Denies any history of recurrent headaches, strokes, TIA, or seizure disorder.     Hematological: Denies any food allergies, seasonal allergies, bleeding disorders, or lymphadenopathy.     Psychiatric/Behavioral: Denies any history of depression, substance abuse, or change in cognitive function.         Physical Exam     Constitutional: Cooperative, alert and oriented, in no acute distress.     HENT:   Head: Normocephalic, normal " hair patterns, no masses or tenderness.  Ears, Nose, and Throat: No gross abnormalities. No pallor or cyanosis.   Eyes: EOMS intact, PERRL, conjunctivae and lids unremarkable. Fundoscopic exam and visual fields not performed.   Neck: No palpable masses or adenopathy, no thyromegaly, no JVD, carotid pulses are full and equal bilaterally and without  Bruits.     Cardiovascular: Regular rhythm, S1 and S2 normal, no S3 or S4.  No murmurs, gallops, or rubs detected.     Pulmonary/Chest: Chest: normal symmetry, normal respiratory excursion, no intercostal retraction, no use of accessory muscles.            Pulmonary: Normal breath sounds. No rales or ronchi.    Abdominal: Abdomen soft, bowel sounds normoactive, no masses, no hepatosplenomegaly, non-tender, no bruits.     Musculoskeletal: No deformities, clubbing, cyanosis, erythema, or edema observed.     Neurological: No gross motor or sensory deficits noted, affect appropriate, oriented to time, person, place.     Skin: Warm and dry to the touch, no apparent skin lesions or masses noted.     Psychiatric: He has a normal mood and affect. His behavior is normal. Judgment and thought content normal.         Procedures      Lab Results   Component Value Date    WBC 8.6 11/16/2018    HGB 14.3 (L) 11/16/2018    HCT 45.2 11/16/2018    MCV 96 (H) 11/16/2018     11/16/2018     Lab Results   Component Value Date    GLUCOSE 193 (H) 04/25/2017    BUN 30 (H) 09/24/2019    CREATININE 1.9 (H) 09/24/2019    EGFRIFNONA 34 (L) 04/25/2017    BCR 20.0 04/25/2017    CO2 23.0 04/25/2017    CALCIUM 9.1 09/24/2019    ALBUMIN 3.6 09/24/2019    AST 16 09/24/2019    ALT 18 09/24/2019     Lab Results   Component Value Date    CHOL 173 02/20/2020    CHOL 197 09/24/2019    CHOL 174 03/18/2019     Lab Results   Component Value Date    TRIG 147 02/20/2020    TRIG 174 (H) 09/24/2019    TRIG 81 03/18/2019     Lab Results   Component Value Date    HDL 44 02/20/2020    HDL 51 09/24/2019    HDL 51  03/18/2019     No components found for: LDLCALC  Lab Results   Component Value Date     (H) 02/20/2020     (H) 09/24/2019     (H) 03/18/2019     No results found for: HDLLDLRATIO  No components found for: CHOLHDL  Lab Results   Component Value Date    HGBA1C 7.5 (H) 07/03/2020     No results found for: TSH, L0SKBLD, C0LZFJX, THYROIDAB        ASSESSMENT AND PLAN  Mrs. Chacon has multiple medical issues as discussed in the history of present illness but is stable from a cardiac standpoint with no clinical evidence of definite angina, arrhythmia or congestive heart failure.  I have continued his present antiplatelet therapy with aspirin and Plavix, lipid-lowering therapy with atorvastatin, antihypertensive therapy with olmesartan.  His lab results from February 2020 were reviewed.  His LDL was 100.    David was seen today for mixed hyperlipidemia.    Diagnoses and all orders for this visit:    Essential hypertension  -     ECG 12 Lead    Mixed hyperlipidemia    Atherosclerosis of native coronary artery of native heart without angina pectoris        Patient's Body mass index is 26.08 kg/m². BMI is above normal parameters. Recommendations include: exercise counseling and nutrition counseling.      David Chacon  reports that he has quit smoking. He has never used smokeless tobacco..      Janeen Blevins MD  8/5/2020  11:48

## 2020-12-28 DIAGNOSIS — I65.22 ASYMPTOMATIC STENOSIS OF LEFT CAROTID ARTERY: Primary | ICD-10-CM

## 2021-01-02 ENCOUNTER — HOSPITAL ENCOUNTER (INPATIENT)
Facility: HOSPITAL | Age: 81
LOS: 1 days | Discharge: HOME | End: 2021-01-02
Attending: EMERGENCY MEDICINE | Admitting: INTERNAL MEDICINE

## 2021-01-02 ENCOUNTER — APPOINTMENT (OUTPATIENT)
Dept: GENERAL RADIOLOGY | Facility: HOSPITAL | Age: 81
End: 2021-01-02

## 2021-01-02 ENCOUNTER — READMISSION MANAGEMENT (OUTPATIENT)
Dept: CALL CENTER | Facility: HOSPITAL | Age: 81
End: 2021-01-02

## 2021-01-02 VITALS
DIASTOLIC BLOOD PRESSURE: 71 MMHG | WEIGHT: 170 LBS | HEART RATE: 67 BPM | SYSTOLIC BLOOD PRESSURE: 163 MMHG | TEMPERATURE: 99.8 F | HEIGHT: 68 IN | BODY MASS INDEX: 25.76 KG/M2 | RESPIRATION RATE: 16 BRPM | OXYGEN SATURATION: 97 %

## 2021-01-02 DIAGNOSIS — J12.82 PNEUMONIA DUE TO COVID-19 VIRUS: Primary | ICD-10-CM

## 2021-01-02 DIAGNOSIS — U07.1 PNEUMONIA DUE TO COVID-19 VIRUS: Primary | ICD-10-CM

## 2021-01-02 DIAGNOSIS — N18.9 CHRONIC KIDNEY DISEASE, UNSPECIFIED CKD STAGE: ICD-10-CM

## 2021-01-02 DIAGNOSIS — E08.65 DIABETES MELLITUS DUE TO UNDERLYING CONDITION WITH HYPERGLYCEMIA, UNSPECIFIED WHETHER LONG TERM INSULIN USE (HCC): ICD-10-CM

## 2021-01-02 LAB
ALBUMIN SERPL-MCNC: 3.7 G/DL (ref 3.5–5.2)
ALBUMIN/GLOB SERPL: 1.1 G/DL
ALP SERPL-CCNC: 80 U/L (ref 39–117)
ALT SERPL W P-5'-P-CCNC: 18 U/L (ref 1–41)
ANION GAP SERPL CALCULATED.3IONS-SCNC: 12 MMOL/L (ref 5–15)
AST SERPL-CCNC: 19 U/L (ref 1–40)
BASOPHILS # BLD AUTO: 0.01 10*3/MM3 (ref 0–0.2)
BASOPHILS NFR BLD AUTO: 0.2 % (ref 0–1.5)
BILIRUB SERPL-MCNC: 0.4 MG/DL (ref 0–1.2)
BUN SERPL-MCNC: 30 MG/DL (ref 8–23)
BUN/CREAT SERPL: 14.7 (ref 7–25)
CALCIUM SPEC-SCNC: 9.3 MG/DL (ref 8.6–10.5)
CHLORIDE SERPL-SCNC: 97 MMOL/L (ref 98–107)
CO2 SERPL-SCNC: 26 MMOL/L (ref 22–29)
CREAT SERPL-MCNC: 2.04 MG/DL (ref 0.76–1.27)
D-LACTATE SERPL-SCNC: 1.4 MMOL/L (ref 0.5–2)
DEPRECATED RDW RBC AUTO: 42.4 FL (ref 37–54)
EOSINOPHIL # BLD AUTO: 0.04 10*3/MM3 (ref 0–0.4)
EOSINOPHIL NFR BLD AUTO: 0.8 % (ref 0.3–6.2)
ERYTHROCYTE [DISTWIDTH] IN BLOOD BY AUTOMATED COUNT: 12.4 % (ref 12.3–15.4)
FLUAV RNA RESP QL NAA+PROBE: NOT DETECTED
FLUBV RNA RESP QL NAA+PROBE: NOT DETECTED
GFR SERPL CREATININE-BSD FRML MDRD: 32 ML/MIN/1.73
GLOBULIN UR ELPH-MCNC: 3.3 GM/DL
GLUCOSE SERPL-MCNC: 296 MG/DL (ref 65–99)
HCT VFR BLD AUTO: 40.4 % (ref 37.5–51)
HGB BLD-MCNC: 13.4 G/DL (ref 13–17.7)
HOLD SPECIMEN: NORMAL
IMM GRANULOCYTES # BLD AUTO: 0.04 10*3/MM3 (ref 0–0.05)
IMM GRANULOCYTES NFR BLD AUTO: 0.8 % (ref 0–0.5)
LYMPHOCYTES # BLD AUTO: 0.92 10*3/MM3 (ref 0.7–3.1)
LYMPHOCYTES NFR BLD AUTO: 18.5 % (ref 19.6–45.3)
MCH RBC QN AUTO: 30.7 PG (ref 26.6–33)
MCHC RBC AUTO-ENTMCNC: 33.2 G/DL (ref 31.5–35.7)
MCV RBC AUTO: 92.7 FL (ref 79–97)
MONOCYTES # BLD AUTO: 0.44 10*3/MM3 (ref 0.1–0.9)
MONOCYTES NFR BLD AUTO: 8.9 % (ref 5–12)
NEUTROPHILS NFR BLD AUTO: 3.51 10*3/MM3 (ref 1.7–7)
NEUTROPHILS NFR BLD AUTO: 70.8 % (ref 42.7–76)
NRBC BLD AUTO-RTO: 0 /100 WBC (ref 0–0.2)
PLATELET # BLD AUTO: 150 10*3/MM3 (ref 140–450)
PMV BLD AUTO: 10.7 FL (ref 6–12)
POTASSIUM SERPL-SCNC: 4.5 MMOL/L (ref 3.5–5.2)
PROCALCITONIN SERPL-MCNC: 0.24 NG/ML (ref 0–0.25)
PROT SERPL-MCNC: 7 G/DL (ref 6–8.5)
RBC # BLD AUTO: 4.36 10*6/MM3 (ref 4.14–5.8)
SARS-COV-2 RNA RESP QL NAA+PROBE: DETECTED
SODIUM SERPL-SCNC: 135 MMOL/L (ref 136–145)
WBC # BLD AUTO: 4.96 10*3/MM3 (ref 3.4–10.8)
WHOLE BLOOD HOLD SPECIMEN: NORMAL

## 2021-01-02 PROCEDURE — 25010000002 CEFTRIAXONE: Performed by: EMERGENCY MEDICINE

## 2021-01-02 PROCEDURE — M0239 BAMLANIVIMAB-XXXX INFUSION: HCPCS

## 2021-01-02 PROCEDURE — 96365 THER/PROPH/DIAG IV INF INIT: CPT

## 2021-01-02 PROCEDURE — 25010000006 BAMLANIVIMAB 700 MG/20ML SOLUTION 20 ML VIAL: Performed by: EMERGENCY MEDICINE

## 2021-01-02 PROCEDURE — XW033F6 INTRODUCTION OF BAMLANIVIMAB MONOCLONAL ANTIBODY INTO PERIPHERAL VEIN, PERCUTANEOUS APPROACH, NEW TECHNOLOGY GROUP 6: ICD-10-PCS | Performed by: EMERGENCY MEDICINE

## 2021-01-02 PROCEDURE — 80053 COMPREHEN METABOLIC PANEL: CPT | Performed by: EMERGENCY MEDICINE

## 2021-01-02 PROCEDURE — 85025 COMPLETE CBC W/AUTO DIFF WBC: CPT | Performed by: EMERGENCY MEDICINE

## 2021-01-02 PROCEDURE — 63710000001 INSULIN REGULAR HUMAN PER 5 UNITS: Performed by: EMERGENCY MEDICINE

## 2021-01-02 PROCEDURE — 96367 TX/PROPH/DG ADDL SEQ IV INF: CPT

## 2021-01-02 PROCEDURE — 25010000002 AZITHROMYCIN PER 500 MG: Performed by: EMERGENCY MEDICINE

## 2021-01-02 PROCEDURE — 83605 ASSAY OF LACTIC ACID: CPT | Performed by: EMERGENCY MEDICINE

## 2021-01-02 PROCEDURE — 71045 X-RAY EXAM CHEST 1 VIEW: CPT

## 2021-01-02 PROCEDURE — 84145 PROCALCITONIN (PCT): CPT | Performed by: EMERGENCY MEDICINE

## 2021-01-02 PROCEDURE — 87636 SARSCOV2 & INF A&B AMP PRB: CPT | Performed by: EMERGENCY MEDICINE

## 2021-01-02 PROCEDURE — 87040 BLOOD CULTURE FOR BACTERIA: CPT | Performed by: EMERGENCY MEDICINE

## 2021-01-02 PROCEDURE — 99284 EMERGENCY DEPT VISIT MOD MDM: CPT

## 2021-01-02 PROCEDURE — 82962 GLUCOSE BLOOD TEST: CPT

## 2021-01-02 RX ORDER — DEXTROMETHORPHAN HYDROBROMIDE AND PROMETHAZINE HYDROCHLORIDE 15; 6.25 MG/5ML; MG/5ML
5 SYRUP ORAL 4 TIMES DAILY PRN
Qty: 118 ML | Refills: 0 | Status: SHIPPED | OUTPATIENT
Start: 2021-01-02 | End: 2021-10-12

## 2021-01-02 RX ORDER — SODIUM CHLORIDE 9 MG/ML
30 INJECTION, SOLUTION INTRAVENOUS ONCE
Status: DISCONTINUED | OUTPATIENT
Start: 2021-01-02 | End: 2021-01-02 | Stop reason: SDUPTHER

## 2021-01-02 RX ORDER — SODIUM CHLORIDE 0.9 % (FLUSH) 0.9 %
10 SYRINGE (ML) INJECTION AS NEEDED
Status: DISCONTINUED | OUTPATIENT
Start: 2021-01-02 | End: 2021-01-02 | Stop reason: HOSPADM

## 2021-01-02 RX ORDER — METHYLPREDNISOLONE SODIUM SUCCINATE 125 MG/2ML
125 INJECTION, POWDER, LYOPHILIZED, FOR SOLUTION INTRAMUSCULAR; INTRAVENOUS ONCE AS NEEDED
Status: DISCONTINUED | OUTPATIENT
Start: 2021-01-02 | End: 2021-01-02 | Stop reason: SDUPTHER

## 2021-01-02 RX ORDER — ALBUTEROL SULFATE 90 UG/1
2 AEROSOL, METERED RESPIRATORY (INHALATION) EVERY 4 HOURS PRN
Qty: 6.7 G | Refills: 0 | Status: SHIPPED | OUTPATIENT
Start: 2021-01-02 | End: 2021-10-12

## 2021-01-02 RX ORDER — IBUPROFEN 800 MG
TABLET ORAL DAILY
COMMUNITY

## 2021-01-02 RX ORDER — ONDANSETRON 4 MG/1
4 TABLET, ORALLY DISINTEGRATING ORAL EVERY 8 HOURS PRN
Qty: 10 TABLET | Refills: 0 | Status: SHIPPED | OUTPATIENT
Start: 2021-01-02 | End: 2022-03-07

## 2021-01-02 RX ORDER — METHYLPREDNISOLONE SODIUM SUCCINATE 125 MG/2ML
125 INJECTION, POWDER, LYOPHILIZED, FOR SOLUTION INTRAMUSCULAR; INTRAVENOUS ONCE AS NEEDED
Status: DISCONTINUED | OUTPATIENT
Start: 2021-01-02 | End: 2021-01-02 | Stop reason: HOSPADM

## 2021-01-02 RX ORDER — AZITHROMYCIN 250 MG/1
TABLET, FILM COATED ORAL
Qty: 6 TABLET | Refills: 0 | Status: SHIPPED | OUTPATIENT
Start: 2021-01-02 | End: 2021-01-02 | Stop reason: SDUPTHER

## 2021-01-02 RX ORDER — DIPHENHYDRAMINE HYDROCHLORIDE 50 MG/ML
50 INJECTION INTRAMUSCULAR; INTRAVENOUS ONCE AS NEEDED
Status: DISCONTINUED | OUTPATIENT
Start: 2021-01-02 | End: 2021-01-02 | Stop reason: HOSPADM

## 2021-01-02 RX ORDER — EPINEPHRINE 1 MG/ML
0.3 INJECTION, SOLUTION INTRAMUSCULAR; SUBCUTANEOUS ONCE AS NEEDED
Status: DISCONTINUED | OUTPATIENT
Start: 2021-01-02 | End: 2021-01-02 | Stop reason: SDUPTHER

## 2021-01-02 RX ORDER — MULTIVIT WITH MINERALS/LUTEIN
500 TABLET ORAL DAILY
COMMUNITY

## 2021-01-02 RX ORDER — AZITHROMYCIN 250 MG/1
TABLET, FILM COATED ORAL
Qty: 4 TABLET | Refills: 0 | Status: ON HOLD | OUTPATIENT
Start: 2021-01-02 | End: 2021-01-03

## 2021-01-02 RX ORDER — EPINEPHRINE 1 MG/ML
0.3 INJECTION, SOLUTION INTRAMUSCULAR; SUBCUTANEOUS ONCE AS NEEDED
Status: DISCONTINUED | OUTPATIENT
Start: 2021-01-02 | End: 2021-01-02 | Stop reason: HOSPADM

## 2021-01-02 RX ORDER — DIPHENHYDRAMINE HYDROCHLORIDE 50 MG/ML
50 INJECTION INTRAMUSCULAR; INTRAVENOUS ONCE AS NEEDED
Status: DISCONTINUED | OUTPATIENT
Start: 2021-01-02 | End: 2021-01-02 | Stop reason: SDUPTHER

## 2021-01-02 RX ORDER — SODIUM CHLORIDE 9 MG/ML
30 INJECTION, SOLUTION INTRAVENOUS ONCE
Status: DISCONTINUED | OUTPATIENT
Start: 2021-01-02 | End: 2021-01-02 | Stop reason: HOSPADM

## 2021-01-02 RX ADMIN — CEFTRIAXONE 1 G: 1 INJECTION, POWDER, FOR SOLUTION INTRAMUSCULAR; INTRAVENOUS at 15:49

## 2021-01-02 RX ADMIN — HUMAN INSULIN 4 UNITS: 100 INJECTION, SOLUTION SUBCUTANEOUS at 15:47

## 2021-01-02 RX ADMIN — SODIUM CHLORIDE 500 ML: 9 INJECTION, SOLUTION INTRAVENOUS at 15:50

## 2021-01-02 RX ADMIN — AZITHROMYCIN MONOHYDRATE 500 MG: 500 INJECTION, POWDER, LYOPHILIZED, FOR SOLUTION INTRAVENOUS at 16:46

## 2021-01-02 RX ADMIN — SODIUM CHLORIDE 700 MG: 9 INJECTION, SOLUTION INTRAVENOUS at 18:02

## 2021-01-02 NOTE — ED PROVIDER NOTES
"Subjective   81yo male pmh significant htn/hyperlipidemia/dm2/cad/ckd/alzheimer dementia/mdd presents ED c/o 1wk hx productive cough \"clear\" sputum/congestion/anosmia/dysguesia/fever/chills/headaches.  Denies n/v/d/soa/chest pain/abd pain/dysuria.  Pt additionally reports visual hallucinations last evening characterized as \"seeing cards on television and holding a book that wasn't there.\"      History provided by:  Patient  Illness  Severity:  Moderate  Onset quality:  Gradual  Duration:  1 week  Chronicity:  New  Associated symptoms: congestion, cough and fever        Review of Systems   Constitutional: Positive for chills and fever.   HENT: Positive for congestion.    Eyes: Negative for redness.   Respiratory: Positive for cough.    Cardiovascular: Negative.    Gastrointestinal: Negative.    Genitourinary: Negative.    Allergic/Immunologic: Negative for immunocompromised state.   All other systems reviewed and are negative.      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 He requires branded Benicar   • Lisinopril Cough     cough   • Hydrocodone Cough       Past Surgical History:   Procedure Laterality Date   • CARDIAC CATHETERIZATION     • CATARACT EXTRACTION EXTRACAPSULAR W/ INTRAOCULAR LENS IMPLANTATION     • LAMINOTOMY Left " 01/06/1969    Exc HNP 5th lumbar   • OTHER SURGICAL HISTORY  05/31/2016    EXTENDED VISUAL FIELDS STUDY 33782 (Open angle with borderline findings, low risk, unspecified eye   • OTHER SURGICAL HISTORY  04/26/2016    FINDINGS OF DIL MAC OR FUND EXAM COMM TO MD CASILLAS (Type 2 diabetes mellitus 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   • OTHER SURGICAL HISTORY  04/26/2016    OCT DISC NFL 11645 (Type 2 diabetes mellitus 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)    • OTHER SURGICAL HISTORY  09/30/2015    OCT SCAN RETINA 07944 (Epiretinal membrane   • PERICARDIECTOMY  08/04/1972    Pericarditis etiol undetermined       History reviewed. No pertinent family history.    Social History     Socioeconomic History   • Marital status:      Spouse name: Not on file   • Number of children: Not on file   • Years of education: Not on file   • Highest education level: Not on file   Tobacco Use   • Smoking status: Former Smoker   • Smokeless tobacco: Never Used   Substance and Sexual Activity   • Alcohol use: No   • Drug use: No   • Sexual activity: Defer           Objective   Physical Exam  Vitals signs and nursing note reviewed.   Constitutional:       Appearance: Normal appearance.   HENT:      Head: Normocephalic and atraumatic.      Nose: Nose normal.      Mouth/Throat:      Mouth: Mucous membranes are moist.   Eyes:      Pupils: Pupils are equal, round, and reactive to light.   Neck:      Musculoskeletal: Normal range of motion and neck supple. No neck rigidity.      Trachea: Trachea and phonation normal.      Meningeal: Brudzinski's sign absent.   Cardiovascular:      Rate and Rhythm: Normal rate and regular rhythm.      Pulses: Normal pulses.      Heart sounds: Normal heart sounds. No murmur. No friction rub. No gallop.    Pulmonary:      Effort: Pulmonary effort is normal. No respiratory distress.      Breath sounds: Normal breath sounds. No stridor. No  wheezing, rhonchi or rales.   Abdominal:      General: Bowel sounds are normal. There is no distension.      Palpations: Abdomen is soft.      Tenderness: There is no abdominal tenderness. There is no guarding or rebound.   Musculoskeletal:         General: No swelling.   Lymphadenopathy:      Cervical: No cervical adenopathy.   Skin:     General: Skin is warm and dry.   Neurological:      Mental Status: He is alert.      GCS: GCS eye subscore is 4. GCS verbal subscore is 5. GCS motor subscore is 6.         Procedures           ED Course      Labs Reviewed   COVID-19 AND FLU A/B, NP SWAB IN TRANSPORT MEDIA 8-12 HR TAT - Abnormal; Notable for the following components:       Result Value    COVID19 Detected (*)     All other components within normal limits    Narrative:     Fact sheet for providers: https://www.fda.gov/media/477828/download    Fact sheet for patients: https://www.fda.gov/media/036105/download    Test performed by PCR.  Influenza A and Influenza B negative results should be considered presumptive in samples that have a positive SARS-CoV-2 result.    Competitive inhibition studies showed that SARS-CoV-2 virus, when present at concentrations above 3.6E+04 copies/mL, can inhibit the detection and amplification of influenza A and influenza B virus RNA if present at or below 1.8E+02 copies/mL or 4.9E+02 copies/mL, respectively, and may lead to false negative influenza virus results. If co-infection with influenza A or influenza B virus is suspected in samples with a positive SARS-CoV-2 result, the sample should be re-tested with another FDA cleared, approved, or authorized influenza test, if influenza virus detection would change clinical management.   COMPREHENSIVE METABOLIC PANEL - Abnormal; Notable for the following components:    Glucose 296 (*)     BUN 30 (*)     Creatinine 2.04 (*)     Sodium 135 (*)     Chloride 97 (*)     eGFR Non  Amer 32 (*)     All other components within normal limits     "Narrative:     GFR Normal >60  Chronic Kidney Disease <60  Kidney Failure <15     CBC WITH AUTO DIFFERENTIAL - Abnormal; Notable for the following components:    Lymphocyte % 18.5 (*)     Immature Grans % 0.8 (*)     All other components within normal limits   LACTIC ACID, PLASMA - Normal   PROCALCITONIN - Normal    Narrative:     As a Marker for Sepsis (Non-Neonates):   1. <0.5 ng/mL represents a low risk of severe sepsis and/or septic shock.  1. >2 ng/mL represents a high risk of severe sepsis and/or septic shock.    As a Marker for Lower Respiratory Tract Infections that require antibiotic therapy:  PCT on Admission     Antibiotic Therapy             6-12 Hrs later  > 0.5                Strongly Recommended            >0.25 - <0.5         Recommended  0.1 - 0.25           Discouraged                   Remeasure/reassess PCT  <0.1                 Strongly Discouraged          Remeasure/reassess PCT      As 28 day mortality risk marker: \"Change in Procalcitonin Result\" (> 80 % or <=80 %) if Day 0 (or Day 1) and Day 4 values are available. Refer to http://www.[employer]-pct-calculator.com/   Change in PCT <=80 %   A decrease of PCT levels below or equal to 80 % defines a positive change in PCT test result representing a higher risk for 28-day all-cause mortality of patients diagnosed with severe sepsis or septic shock.  Change in PCT > 80 %   A decrease of PCT levels of more than 80 % defines a negative change in PCT result representing a lower risk for 28-day all-cause mortality of patients diagnosed with severe sepsis or septic shock.                Results may be falsely decreased if patient taking Biotin.    BLOOD CULTURE   BLOOD CULTURE   URINALYSIS W/ MICROSCOPIC IF INDICATED (NO CULTURE)   CBC AND DIFFERENTIAL    Narrative:     The following orders were created for panel order CBC & Differential.  Procedure                               Abnormality         Status                     ---------                      "          -----------         ------                     CBC Auto Differential[[id number]]        Abnormal            Final result                 Please view results for these tests on the individual orders.   EXTRA TUBES    Narrative:     The following orders were created for panel order Extra Tubes.  Procedure                               Abnormality         Status                     ---------                               -----------         ------                     Light Blue Top[[id number]]                                   Final result               Gold Top - SST[[id number]]                                   Final result                 Please view results for these tests on the individual orders.   LIGHT BLUE TOP   GOLD TOP - SST     Xr Chest 1 View    Result Date: 1/2/2021  Narrative: PROCEDURE: XR CHEST 1 VW VIEWS:Single INDICATION: Cough COMPARISON:  None FINDINGS:   - lines/tubes: None. Median sternotomy wires are present.   - cardiac: Size within normal limits.   - mediastinum: Contour within normal limits.   - lungs: There is mild patchy opacification in the left mid to lower lung zone and right lower lung zone.   - pleura: No evidence of  fluid.    - osseous: Unremarkable for age.     Impression: Patchy bilateral opacities, left more extensive than right, and suspicious for pneumonia. Imaging features can be seen with COVID-19 pneumonia, though are nonspecific and can occur with a variety of infectious and noninfectious processes Electronically signed by:  Sumi Swanson MD  1/2/2021 2:30 PM CST Workstation: [id number]                                         MDM  Number of Diagnoses or Management Options  Chronic kidney disease, unspecified CKD stage:   Diabetes mellitus due to underlying condition with hyperglycemia, unspecified whether long term insulin use (CMS/[facility]):   Pneumonia due to COVID-19 virus:   Diagnosis management comments: Labs/radiographic studies reviewed. Creatinine stable 2.0mg/dl  from baseline. No respiratory distress. sao2 97-98% RA. Normal mental status. gcs 15. COVID-19 (+).  Hospitalist consult obtained (Dr. Beverly) who recommends discharge home after receiving Bamlanivimab infusion if patient agreeable.  See hospitalist consult note.    The FDA has issued an Emergency Use Authorization (EUA) to permit emergency use of the unapproved product bamlanivimab for treatment of laboratory confirmed COVID-19 in certain ambulatory patients. There is no prescribing information or package insert, however, the FDA has authorized distribution of Fact Sheets for patients and/or caregivers for additional information on this investigational therapy. I have provided the Fact Sheet to and discussed the following with the patient and he/she agreed to proceed:  FDA has authorized the emergency use (EUA) of bamlanivimab, which is not an FDA approved drug.  The patient has the option to accept or refuse bamlanivimab at any time.  The significant known and potential risks and benefits of bamlanivimab and the extent to which such risks and benefits are unknown.  Information on available alternative treatments and the risks and benefits of those alternatives have been shared.         Amount and/or Complexity of Data Reviewed  Clinical lab tests: reviewed  Tests in the radiology section of CPT®: reviewed  Decide to obtain previous medical records or to obtain history from someone other than the patient: yes        Final diagnoses:   Pneumonia due to COVID-19 virus   Chronic kidney disease, unspecified CKD stage   Diabetes mellitus due to underlying condition with hyperglycemia, unspecified whether long term insulin use (CMS/[facility])            Chon France MD  01/02/21 1543       Chon France MD  01/02/21 1630       Chon France MD  01/02/21 1642       Chon France MD  01/02/21 1647

## 2021-01-02 NOTE — CONSULTS
[facility] Medicine Consult  Consults    Date of Admission: 1/2/2021  Date of Consult: 01/02/21    Primary Care Physician: Yehuda Victor MD  Referring Physician:  Jw Us MD      Chief Complaint/Reason for Consultation: COVID-19 pneumonia    HPI: Patient is a 80-year-old man with a past medical history of type 2 diabetes mellitus, pericarditis, CKD stage III.  He presents with complaints of headache, fevers and poor appetite of 1 week duration.  He states that his wife had tested positive for Covid 2 weeks ago and has been recovering. However about 1 week ago he developed worsening headache, fevers and cough productive of poor phlegm.  He also had poor appetite during this..  He denies diarrhea, dysuria, abdominal pain, nausea or vomiting.  About 2 days ago he expressed some visual hallucinations while he was watching TV.  He saw some cards appear and disappear. He also thought he had a book in his hand but there was no book near him.  He has had no more hallucinations over the past 24 hours.  On account of his symptoms he presented to the emergency room.  Covid test was positive. However patient is saturating adequately on room air. He is alert and oriented x3 at the time of my evaluation.    His chest x-ray showed some bilateral infiltrates he had creatinine of 2 which is his baseline.    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    Past Surgical History:  has a past surgical history that includes Other surgical history  (05/31/2016); Other surgical history (04/26/2016); Other surgical history (04/26/2016); Other surgical history (09/30/2015); Laminotomy (Left, 01/06/1969); Pericardiectomy (08/04/1972); Cardiac catheterization; and Cataract extraction, extracapsular w/ intraocular lens implant.    Family History: family history includes Heart disease in his father and mother.    Social History:  reports that he has quit smoking. He has never used smokeless tobacco. He reports that he does not drink alcohol or use drugs.    Allergies:   Allergies   Allergen Reactions   • Olmesartan Other (See Comments)     Patient had cough and dry skin to generic olmsartan. He requires branded Benicar   • Lisinopril Cough     cough   • Hydrocodone Cough     Medications: Scheduled Meds:azithromycin, 500 mg, Intravenous, Once  bamlanivimab, 700 mg, Intravenous, Once  sodium chloride, 30 mL, Intravenous, Once      Continuous Infusions:   PRN Meds:.diphenhydrAMINE  •  EPINEPHrine  •  methylPREDNISolone sodium succinate  •  [COMPLETED] Insert peripheral IV **AND** sodium chloride    I have reviewed the patient's current medications    Review of Systems:  Review of Systems   Constitutional: Positive for fever. Negative for activity change, appetite change, chills and fatigue.   HENT: Negative for congestion, sore throat and trouble swallowing.    Respiratory: Positive for cough and shortness of breath. Negative for chest tightness and wheezing.    Cardiovascular: Negative for chest pain, palpitations and leg swelling.   Gastrointestinal: Negative for abdominal distention, abdominal pain, diarrhea, nausea and vomiting.   Genitourinary: Negative for difficulty urinating, dysuria and hematuria.   Musculoskeletal: Negative for arthralgias, back pain and myalgias.   Skin: Negative for pallor and rash.   Neurological: Negative for dizziness, syncope, weakness, light-headedness and headaches.   Hematological: Negative for adenopathy. Does not bruise/bleed  easily.   Psychiatric/Behavioral: Negative for agitation and confusion. The patient is not nervous/anxious.       Otherwise complete ROS is negative except as mentioned above.    Physical Exam:   Temp:  [99.8 °F (37.7 °C)] 99.8 °F (37.7 °C)  Heart Rate:  [59-71] 71  Resp:  [18-20] 18  BP: (111-135)/(54-79) 133/79     Physical Exam  Constitutional:       General: He is not in acute distress.     Appearance: He is well-developed. He is not toxic-appearing or diaphoretic.   HENT:      Head: Normocephalic and atraumatic.   Eyes:      General: No scleral icterus.     Conjunctiva/sclera: Conjunctivae normal.      Pupils: Pupils are equal, round, and reactive to light.   Neck:      Musculoskeletal: Normal range of motion and neck supple.      Thyroid: No thyromegaly.      Vascular: No JVD.      Trachea: No tracheal deviation.   Cardiovascular:      Rate and Rhythm: Normal rate and regular rhythm.      Heart sounds: Normal heart sounds. No murmur. No friction rub. No gallop.    Pulmonary:      Effort: Pulmonary effort is normal. No respiratory distress.      Breath sounds: Normal breath sounds. No stridor. No wheezing or rales.   Chest:      Chest wall: No tenderness.   Abdominal:      General: Bowel sounds are normal. There is no distension.      Palpations: Abdomen is soft. There is no mass.      Tenderness: There is no abdominal tenderness. There is no guarding or rebound.      Hernia: No hernia is present.   Musculoskeletal: Normal range of motion.         General: No tenderness or deformity.   Lymphadenopathy:      Cervical: No cervical adenopathy.   Skin:     General: Skin is warm and dry.      Coloration: Skin is not pale.      Findings: No erythema or rash.   Neurological:      Mental Status: He is alert and oriented to person, place, and time.      Cranial Nerves: No cranial nerve deficit.      Sensory: No sensory deficit.      Motor: No abnormal muscle tone.      Coordination: Coordination normal.   Psychiatric:    "      Behavior: Behavior normal.         Thought Content: Thought content normal.         Judgment: Judgment normal.       Results Reviewed:  I have personally reviewed current lab, radiology, and data and agree with results.  Lab Results (last 24 hours)     Procedure Component Value Units Date/Time    Procalcitonin [[id number]]  (Normal) Collected: 01/02/21 1429    Specimen: Blood Updated: 01/02/21 1631     Procalcitonin 0.24 ng/mL     Narrative:      As a Marker for Sepsis (Non-Neonates):   1. <0.5 ng/mL represents a low risk of severe sepsis and/or septic shock.  1. >2 ng/mL represents a high risk of severe sepsis and/or septic shock.    As a Marker for Lower Respiratory Tract Infections that require antibiotic therapy:  PCT on Admission     Antibiotic Therapy             6-12 Hrs later  > 0.5                Strongly Recommended            >0.25 - <0.5         Recommended  0.1 - 0.25           Discouraged                   Remeasure/reassess PCT  <0.1                 Strongly Discouraged          Remeasure/reassess PCT      As 28 day mortality risk marker: \"Change in Procalcitonin Result\" (> 80 % or <=80 %) if Day 0 (or Day 1) and Day 4 values are available. Refer to [email or web address]/   Change in PCT <=80 %   A decrease of PCT levels below or equal to 80 % defines a positive change in PCT test result representing a higher risk for 28-day all-cause mortality of patients diagnosed with severe sepsis or septic shock.  Change in PCT > 80 %   A decrease of PCT levels of more than 80 % defines a negative change in PCT result representing a lower risk for 28-day all-cause mortality of patients diagnosed with severe sepsis or septic shock.                Results may be falsely decreased if patient taking Biotin.     Blood Culture - Blood, Arm, Left [[id number]] Collected: 01/02/21 1538    Specimen: Blood from Arm, Left Updated: 01/02/21 1538    Lactic Acid, Plasma [[id number]]  (Normal) Collected: " 01/02/21 1413    Specimen: Blood Updated: 01/02/21 1533     Lactate 1.4 mmol/L     Extra Tubes [[id number]] Collected: 01/02/21 1429    Specimen: Blood Updated: 01/02/21 1530    Narrative:      The following orders were created for panel order Extra Tubes.  Procedure                               Abnormality         Status                     ---------                               -----------         ------                     Light Blue Top[[id number]]                                   Final result               Gold Top - SST[[id number]]                                   Final result                 Please view results for these tests on the individual orders.    Light Blue Top [[id number]] Collected: 01/02/21 1429    Specimen: Blood Updated: 01/02/21 1530     Extra Tube hold for add-on     Comment: Auto resulted       Gold Top - SST [[id number]] Collected: 01/02/21 1429    Specimen: Blood Updated: 01/02/21 1530     Extra Tube Hold for add-ons.     Comment: Auto resulted.       COVID-19 and FLU A/B PCR - Swab, Nasopharynx [[id number]]  (Abnormal) Collected: 01/02/21 1409    Specimen: Swab from Nasopharynx Updated: 01/02/21 1509     COVID19 Detected     Comment: Enhanced Precautions Requested          Influenza A PCR Not Detected     Influenza B PCR Not Detected    Narrative:      Fact sheet for providers: https://www.fda.gov/media/616294/download    Fact sheet for patients: https://www.fda.gov/media/030760/download    Test performed by PCR.  Influenza A and Influenza B negative results should be considered presumptive in samples that have a positive SARS-CoV-2 result.    Competitive inhibition studies showed that SARS-CoV-2 virus, when present at concentrations above 3.6E+04 copies/mL, can inhibit the detection and amplification of influenza A and influenza B virus RNA if present at or below 1.8E+02 copies/mL or 4.9E+02 copies/mL, respectively, and may lead to false negative influenza virus results. If co-infection  with influenza A or influenza B virus is suspected in samples with a positive SARS-CoV-2 result, the sample should be re-tested with another FDA cleared, approved, or authorized influenza test, if influenza virus detection would change clinical management.    Comprehensive Metabolic Panel [[id number]]  (Abnormal) Collected: 01/02/21 1413    Specimen: Blood Updated: 01/02/21 1448     Glucose 296 mg/dL      BUN 30 mg/dL      Creatinine 2.04 mg/dL      Sodium 135 mmol/L      Potassium 4.5 mmol/L      Comment: Slight hemolysis detected by analyzer. Results may be affected.        Chloride 97 mmol/L      CO2 26.0 mmol/L      Calcium 9.3 mg/dL      Total Protein 7.0 g/dL      Albumin 3.70 g/dL      ALT (SGPT) 18 U/L      AST (SGOT) 19 U/L      Alkaline Phosphatase 80 U/L      Total Bilirubin 0.4 mg/dL      eGFR Non African Amer 32 mL/min/1.73      Globulin 3.3 gm/dL      A/G Ratio 1.1 g/dL      BUN/Creatinine Ratio 14.7     Anion Gap 12.0 mmol/L     Narrative:      GFR Normal >60  Chronic Kidney Disease <60  Kidney Failure <15      CBC & Differential [[id number]]  (Abnormal) Collected: 01/02/21 1413    Specimen: Blood Updated: 01/02/21 1432    Narrative:      The following orders were created for panel order CBC & Differential.  Procedure                               Abnormality         Status                     ---------                               -----------         ------                     CBC Auto Differential[[id number]]        Abnormal            Final result                 Please view results for these tests on the individual orders.    CBC Auto Differential [[id number]]  (Abnormal) Collected: 01/02/21 1413    Specimen: Blood Updated: 01/02/21 1432     WBC 4.96 10*3/mm3      RBC 4.36 10*6/mm3      Hemoglobin 13.4 g/dL      Hematocrit 40.4 %      MCV 92.7 fL      MCH 30.7 pg      MCHC 33.2 g/dL      RDW 12.4 %      RDW-SD 42.4 fl      MPV 10.7 fL      Platelets 150 10*3/mm3      Neutrophil % 70.8 %       Lymphocyte % 18.5 %      Monocyte % 8.9 %      Eosinophil % 0.8 %      Basophil % 0.2 %      Immature Grans % 0.8 %      Neutrophils, Absolute 3.51 10*3/mm3      Lymphocytes, Absolute 0.92 10*3/mm3      Monocytes, Absolute 0.44 10*3/mm3      Eosinophils, Absolute 0.04 10*3/mm3      Basophils, Absolute 0.01 10*3/mm3      Immature Grans, Absolute 0.04 10*3/mm3      nRBC 0.0 /100 WBC         Imaging Results (Last 24 Hours)     Procedure Component Value Units Date/Time    XR Chest 1 View [[id number]] Collected: 01/02/21 1358     Updated: 01/02/21 1431    Narrative:      PROCEDURE: XR CHEST 1 VW    VIEWS:Single    INDICATION: Cough    COMPARISON:  None    FINDINGS:       - lines/tubes: None. Median sternotomy wires are present.    - cardiac: Size within normal limits.    - mediastinum: Contour within normal limits.     - lungs: There is mild patchy opacification in the left mid to  lower lung zone and right lower lung zone.     - pleura: No evidence of  fluid.      - osseous: Unremarkable for age.      Impression:      Patchy bilateral opacities, left more extensive than right, and  suspicious for pneumonia. Imaging features can be seen with  COVID-19 pneumonia, though are nonspecific and can occur with a  variety of infectious and noninfectious processes    Electronically signed by:  Sumi Swanson MD  1/2/2021 2:30 PM CST  Workstation: [id number]          Assessment:  Active Hospital Problems    Diagnosis   • **Pneumonia due to COVID-19 virus   Type 2 diabetes mellitus  Pericarditis  CKD stage III.      Recommendations:     Patient presents with symptoms consistent with Covid pneumonia.  He saturating adequately on room air and denies nausea or vomiting.  He has a poor appetite but can maintain adequate oral intake.  His creatinine of 2 today is at baseline.  Procalcitonin is low and not suggestive of bacterial infection.  Will recommend patient receive  Bamlanivimab therapy in the ER and be discharged home.  Patient  should receive home monitoring via [facility].     Patient was counseled that if he feels worsening shortness of breath or malaise after Bamlanivimab therapy, he can return for further evaluation and management.  Patient and ER provider.    CODE STATUS is DNR/DNI as per patient request.    I discussed the patients findings and my recommendations with: Patient and ER provider.    Mickey Beverly MD  01/02/21  16:52 CST    Part of this note may be an electronic transcription/translation of spoken language to printed text using the Dragon Dictation system.

## 2021-01-02 NOTE — ED TRIAGE NOTES
Patient was placed in face mask during first look triage.  Patient was wearing a face mask throughout encounter.  I wore personal protective equipment throughout the encounter.  Hand hygiene was performed before and after patient encounter. Patient placed in isolated section of waiting room.

## 2021-01-02 NOTE — ED TRIAGE NOTES
Pt reports cough, low grad temp, and loss of sense of taste. He says last night, he was trying to play a game on his TV and the next minute his TV wasn't even on. He also reports sitting down to read a book only to realize he never had one in his hands.

## 2021-01-02 NOTE — DISCHARGE INSTRUCTIONS
Self quarantine x 2 weeks  Return ED fever, shortness of air, chest pain, vomiting, dehydration, worse condition, any other concerns

## 2021-01-03 ENCOUNTER — HOSPITAL ENCOUNTER (OUTPATIENT)
Facility: HOSPITAL | Age: 81
Setting detail: OBSERVATION
LOS: 1 days | Discharge: HOME | End: 2021-01-06
Attending: FAMILY MEDICINE | Admitting: HOSPITALIST

## 2021-01-03 ENCOUNTER — APPOINTMENT (OUTPATIENT)
Dept: GENERAL RADIOLOGY | Facility: HOSPITAL | Age: 81
End: 2021-01-03

## 2021-01-03 ENCOUNTER — READMISSION MANAGEMENT (OUTPATIENT)
Dept: CALL CENTER | Facility: HOSPITAL | Age: 81
End: 2021-01-03

## 2021-01-03 DIAGNOSIS — J12.82 PNEUMONIA DUE TO COVID-19 VIRUS: Primary | ICD-10-CM

## 2021-01-03 DIAGNOSIS — U07.1 PNEUMONIA DUE TO COVID-19 VIRUS: Primary | ICD-10-CM

## 2021-01-03 LAB
ALBUMIN SERPL-MCNC: 3.4 G/DL (ref 3.5–5.2)
ALBUMIN/GLOB SERPL: 1 G/DL
ALP SERPL-CCNC: 67 U/L (ref 39–117)
ALT SERPL W P-5'-P-CCNC: 16 U/L (ref 1–41)
ANION GAP SERPL CALCULATED.3IONS-SCNC: 12 MMOL/L (ref 5–15)
AST SERPL-CCNC: 21 U/L (ref 1–40)
BASOPHILS # BLD AUTO: 0.01 10*3/MM3 (ref 0–0.2)
BASOPHILS NFR BLD AUTO: 0.1 % (ref 0–1.5)
BILIRUB SERPL-MCNC: 0.4 MG/DL (ref 0–1.2)
BUN SERPL-MCNC: 25 MG/DL (ref 8–23)
BUN/CREAT SERPL: 15.3 (ref 7–25)
CALCIUM SPEC-SCNC: 9 MG/DL (ref 8.6–10.5)
CHLORIDE SERPL-SCNC: 99 MMOL/L (ref 98–107)
CO2 SERPL-SCNC: 24 MMOL/L (ref 22–29)
CREAT SERPL-MCNC: 1.63 MG/DL (ref 0.76–1.27)
CRP SERPL-MCNC: 8.54 MG/DL (ref 0–0.5)
D-DIMER, QUANTITATIVE (MAD,POW, STR): 853 NG/ML (FEU) (ref 0–470)
D-LACTATE SERPL-SCNC: 1 MMOL/L (ref 0.5–2)
DEPRECATED RDW RBC AUTO: 41.7 FL (ref 37–54)
EOSINOPHIL # BLD AUTO: 0.05 10*3/MM3 (ref 0–0.4)
EOSINOPHIL NFR BLD AUTO: 0.7 % (ref 0.3–6.2)
ERYTHROCYTE [DISTWIDTH] IN BLOOD BY AUTOMATED COUNT: 12.5 % (ref 12.3–15.4)
FERRITIN SERPL-MCNC: 529.4 NG/ML (ref 30–400)
GFR SERPL CREATININE-BSD FRML MDRD: 41 ML/MIN/1.73
GLOBULIN UR ELPH-MCNC: 3.3 GM/DL
GLUCOSE BLDC GLUCOMTR-MCNC: 233 MG/DL (ref 70–130)
GLUCOSE BLDC GLUCOMTR-MCNC: 255 MG/DL (ref 70–130)
GLUCOSE SERPL-MCNC: 183 MG/DL (ref 65–99)
HBA1C MFR BLD: 8.2 % (ref 4.8–5.6)
HCT VFR BLD AUTO: 36.8 % (ref 37.5–51)
HGB BLD-MCNC: 12.5 G/DL (ref 13–17.7)
HOLD SPECIMEN: NORMAL
IMM GRANULOCYTES # BLD AUTO: 0.05 10*3/MM3 (ref 0–0.05)
IMM GRANULOCYTES NFR BLD AUTO: 0.7 % (ref 0–0.5)
LDH SERPL-CCNC: 234 U/L (ref 135–225)
LYMPHOCYTES # BLD AUTO: 1.36 10*3/MM3 (ref 0.7–3.1)
LYMPHOCYTES NFR BLD AUTO: 19.4 % (ref 19.6–45.3)
MAGNESIUM SERPL-MCNC: 1.6 MG/DL (ref 1.6–2.4)
MCH RBC QN AUTO: 31.2 PG (ref 26.6–33)
MCHC RBC AUTO-ENTMCNC: 34 G/DL (ref 31.5–35.7)
MCV RBC AUTO: 91.8 FL (ref 79–97)
MONOCYTES # BLD AUTO: 0.69 10*3/MM3 (ref 0.1–0.9)
MONOCYTES NFR BLD AUTO: 9.8 % (ref 5–12)
NEUTROPHILS NFR BLD AUTO: 4.86 10*3/MM3 (ref 1.7–7)
NEUTROPHILS NFR BLD AUTO: 69.3 % (ref 42.7–76)
NRBC BLD AUTO-RTO: 0 /100 WBC (ref 0–0.2)
PLATELET # BLD AUTO: 154 10*3/MM3 (ref 140–450)
PMV BLD AUTO: 11.1 FL (ref 6–12)
POTASSIUM SERPL-SCNC: 4.2 MMOL/L (ref 3.5–5.2)
PROCALCITONIN SERPL-MCNC: 0.24 NG/ML (ref 0–0.25)
PROT SERPL-MCNC: 6.7 G/DL (ref 6–8.5)
QT INTERVAL: 406 MS
QTC INTERVAL: 431 MS
RBC # BLD AUTO: 4.01 10*6/MM3 (ref 4.14–5.8)
SODIUM SERPL-SCNC: 135 MMOL/L (ref 136–145)
WBC # BLD AUTO: 7.02 10*3/MM3 (ref 3.4–10.8)
WHOLE BLOOD HOLD SPECIMEN: NORMAL

## 2021-01-03 PROCEDURE — 83605 ASSAY OF LACTIC ACID: CPT | Performed by: FAMILY MEDICINE

## 2021-01-03 PROCEDURE — 80053 COMPREHEN METABOLIC PANEL: CPT | Performed by: FAMILY MEDICINE

## 2021-01-03 PROCEDURE — 83036 HEMOGLOBIN GLYCOSYLATED A1C: CPT | Performed by: INTERNAL MEDICINE

## 2021-01-03 PROCEDURE — 94799 UNLISTED PULMONARY SVC/PX: CPT

## 2021-01-03 PROCEDURE — 96374 THER/PROPH/DIAG INJ IV PUSH: CPT

## 2021-01-03 PROCEDURE — G0378 HOSPITAL OBSERVATION PER HR: HCPCS

## 2021-01-03 PROCEDURE — 93005 ELECTROCARDIOGRAM TRACING: CPT | Performed by: FAMILY MEDICINE

## 2021-01-03 PROCEDURE — 96372 THER/PROPH/DIAG INJ SC/IM: CPT

## 2021-01-03 PROCEDURE — 82728 ASSAY OF FERRITIN: CPT | Performed by: INTERNAL MEDICINE

## 2021-01-03 PROCEDURE — 84145 PROCALCITONIN (PCT): CPT | Performed by: INTERNAL MEDICINE

## 2021-01-03 PROCEDURE — 96361 HYDRATE IV INFUSION ADD-ON: CPT

## 2021-01-03 PROCEDURE — 94640 AIRWAY INHALATION TREATMENT: CPT

## 2021-01-03 PROCEDURE — 82962 GLUCOSE BLOOD TEST: CPT

## 2021-01-03 PROCEDURE — 71045 X-RAY EXAM CHEST 1 VIEW: CPT

## 2021-01-03 PROCEDURE — 25010000002 DEXAMETHASONE PER 1 MG: Performed by: INTERNAL MEDICINE

## 2021-01-03 PROCEDURE — 86140 C-REACTIVE PROTEIN: CPT | Performed by: INTERNAL MEDICINE

## 2021-01-03 PROCEDURE — 25010000002 HEPARIN (PORCINE) PER 1000 UNITS: Performed by: INTERNAL MEDICINE

## 2021-01-03 PROCEDURE — 63710000001 INSULIN ASPART PER 5 UNITS: Performed by: INTERNAL MEDICINE

## 2021-01-03 PROCEDURE — 36415 COLL VENOUS BLD VENIPUNCTURE: CPT | Performed by: FAMILY MEDICINE

## 2021-01-03 PROCEDURE — 93010 ELECTROCARDIOGRAM REPORT: CPT | Performed by: INTERNAL MEDICINE

## 2021-01-03 PROCEDURE — 99284 EMERGENCY DEPT VISIT MOD MDM: CPT

## 2021-01-03 PROCEDURE — 83615 LACTATE (LD) (LDH) ENZYME: CPT | Performed by: INTERNAL MEDICINE

## 2021-01-03 PROCEDURE — 85025 COMPLETE CBC W/AUTO DIFF WBC: CPT | Performed by: FAMILY MEDICINE

## 2021-01-03 PROCEDURE — 87040 BLOOD CULTURE FOR BACTERIA: CPT | Performed by: FAMILY MEDICINE

## 2021-01-03 PROCEDURE — 85379 FIBRIN DEGRADATION QUANT: CPT | Performed by: INTERNAL MEDICINE

## 2021-01-03 PROCEDURE — 83735 ASSAY OF MAGNESIUM: CPT | Performed by: INTERNAL MEDICINE

## 2021-01-03 RX ORDER — DEXAMETHASONE SODIUM PHOSPHATE 4 MG/ML
6 INJECTION, SOLUTION INTRA-ARTICULAR; INTRALESIONAL; INTRAMUSCULAR; INTRAVENOUS; SOFT TISSUE DAILY
Status: DISCONTINUED | OUTPATIENT
Start: 2021-01-03 | End: 2021-01-06 | Stop reason: HOSPADM

## 2021-01-03 RX ORDER — CLOPIDOGREL BISULFATE 75 MG/1
75 TABLET ORAL DAILY
Status: DISCONTINUED | OUTPATIENT
Start: 2021-01-04 | End: 2021-01-06 | Stop reason: HOSPADM

## 2021-01-03 RX ORDER — FOLIC ACID 1 MG/1
1 TABLET ORAL DAILY
Status: DISCONTINUED | OUTPATIENT
Start: 2021-01-03 | End: 2021-01-06 | Stop reason: HOSPADM

## 2021-01-03 RX ORDER — HEPARIN SODIUM 5000 [USP'U]/ML
5000 INJECTION, SOLUTION INTRAVENOUS; SUBCUTANEOUS EVERY 8 HOURS SCHEDULED
Status: DISCONTINUED | OUTPATIENT
Start: 2021-01-03 | End: 2021-01-06 | Stop reason: HOSPADM

## 2021-01-03 RX ORDER — TIMOLOL MALEATE 5 MG/ML
1 SOLUTION/ DROPS OPHTHALMIC DAILY
Status: DISCONTINUED | OUTPATIENT
Start: 2021-01-03 | End: 2021-01-06 | Stop reason: HOSPADM

## 2021-01-03 RX ORDER — SODIUM CHLORIDE 0.9 % (FLUSH) 0.9 %
10 SYRINGE (ML) INJECTION AS NEEDED
Status: DISCONTINUED | OUTPATIENT
Start: 2021-01-03 | End: 2021-01-06 | Stop reason: HOSPADM

## 2021-01-03 RX ORDER — ACETAMINOPHEN 325 MG/1
650 TABLET ORAL EVERY 4 HOURS PRN
Status: DISCONTINUED | OUTPATIENT
Start: 2021-01-03 | End: 2021-01-06 | Stop reason: HOSPADM

## 2021-01-03 RX ORDER — LATANOPROST 50 UG/ML
1 SOLUTION/ DROPS OPHTHALMIC NIGHTLY
Status: DISCONTINUED | OUTPATIENT
Start: 2021-01-03 | End: 2021-01-06 | Stop reason: HOSPADM

## 2021-01-03 RX ORDER — MORPHINE SULFATE 2 MG/ML
2 INJECTION, SOLUTION INTRAMUSCULAR; INTRAVENOUS EVERY 4 HOURS PRN
Status: DISCONTINUED | OUTPATIENT
Start: 2021-01-03 | End: 2021-01-06 | Stop reason: HOSPADM

## 2021-01-03 RX ORDER — NALOXONE HCL 0.4 MG/ML
0.4 VIAL (ML) INJECTION
Status: DISCONTINUED | OUTPATIENT
Start: 2021-01-03 | End: 2021-01-06 | Stop reason: HOSPADM

## 2021-01-03 RX ORDER — ASCORBIC ACID 500 MG
500 TABLET ORAL DAILY
Status: DISCONTINUED | OUTPATIENT
Start: 2021-01-03 | End: 2021-01-06 | Stop reason: HOSPADM

## 2021-01-03 RX ORDER — MELATONIN
2000 DAILY
Status: DISCONTINUED | OUTPATIENT
Start: 2021-01-03 | End: 2021-01-06 | Stop reason: HOSPADM

## 2021-01-03 RX ORDER — ONDANSETRON 2 MG/ML
4 INJECTION INTRAMUSCULAR; INTRAVENOUS EVERY 6 HOURS PRN
Status: DISCONTINUED | OUTPATIENT
Start: 2021-01-03 | End: 2021-01-06 | Stop reason: HOSPADM

## 2021-01-03 RX ORDER — FLUOXETINE HYDROCHLORIDE 20 MG/1
40 CAPSULE ORAL DAILY
Status: DISCONTINUED | OUTPATIENT
Start: 2021-01-04 | End: 2021-01-06 | Stop reason: HOSPADM

## 2021-01-03 RX ORDER — ZINC SULFATE 50(220)MG
220 CAPSULE ORAL DAILY
Status: DISCONTINUED | OUTPATIENT
Start: 2021-01-03 | End: 2021-01-06 | Stop reason: HOSPADM

## 2021-01-03 RX ORDER — DEXTROSE MONOHYDRATE 25 G/50ML
25 INJECTION, SOLUTION INTRAVENOUS
Status: DISCONTINUED | OUTPATIENT
Start: 2021-01-03 | End: 2021-01-06 | Stop reason: HOSPADM

## 2021-01-03 RX ORDER — ASPIRIN 81 MG/1
81 TABLET ORAL DAILY
Status: DISCONTINUED | OUTPATIENT
Start: 2021-01-04 | End: 2021-01-06 | Stop reason: HOSPADM

## 2021-01-03 RX ORDER — SODIUM CHLORIDE 9 MG/ML
50 INJECTION, SOLUTION INTRAVENOUS CONTINUOUS
Status: DISCONTINUED | OUTPATIENT
Start: 2021-01-03 | End: 2021-01-06 | Stop reason: HOSPADM

## 2021-01-03 RX ORDER — SODIUM CHLORIDE 0.9 % (FLUSH) 0.9 %
10 SYRINGE (ML) INJECTION EVERY 12 HOURS SCHEDULED
Status: DISCONTINUED | OUTPATIENT
Start: 2021-01-03 | End: 2021-01-06 | Stop reason: HOSPADM

## 2021-01-03 RX ORDER — ALBUTEROL SULFATE 90 UG/1
2 AEROSOL, METERED RESPIRATORY (INHALATION)
Status: DISCONTINUED | OUTPATIENT
Start: 2021-01-03 | End: 2021-01-06 | Stop reason: HOSPADM

## 2021-01-03 RX ORDER — BUDESONIDE AND FORMOTEROL FUMARATE DIHYDRATE 160; 4.5 UG/1; UG/1
2 AEROSOL RESPIRATORY (INHALATION)
Status: DISCONTINUED | OUTPATIENT
Start: 2021-01-03 | End: 2021-01-06 | Stop reason: HOSPADM

## 2021-01-03 RX ORDER — ATORVASTATIN CALCIUM 20 MG/1
20 TABLET, FILM COATED ORAL DAILY
Status: DISCONTINUED | OUTPATIENT
Start: 2021-01-04 | End: 2021-01-06 | Stop reason: HOSPADM

## 2021-01-03 RX ORDER — NICOTINE POLACRILEX 4 MG
15 LOZENGE BUCCAL
Status: DISCONTINUED | OUTPATIENT
Start: 2021-01-03 | End: 2021-01-06 | Stop reason: HOSPADM

## 2021-01-03 RX ORDER — GUAIFENESIN 600 MG/1
1200 TABLET, EXTENDED RELEASE ORAL EVERY 12 HOURS SCHEDULED
Status: DISCONTINUED | OUTPATIENT
Start: 2021-01-03 | End: 2021-01-06 | Stop reason: HOSPADM

## 2021-01-03 RX ADMIN — DEXAMETHASONE SODIUM PHOSPHATE 6 MG: 4 INJECTION, SOLUTION INTRAMUSCULAR; INTRAVENOUS at 15:33

## 2021-01-03 RX ADMIN — OXYCODONE HYDROCHLORIDE AND ACETAMINOPHEN 500 MG: 500 TABLET ORAL at 14:36

## 2021-01-03 RX ADMIN — BUDESONIDE AND FORMOTEROL FUMARATE DIHYDRATE 2 PUFF: 160; 4.5 AEROSOL RESPIRATORY (INHALATION) at 16:50

## 2021-01-03 RX ADMIN — SODIUM CHLORIDE, PRESERVATIVE FREE 10 ML: 5 INJECTION INTRAVENOUS at 21:13

## 2021-01-03 RX ADMIN — HEPARIN SODIUM 5000 UNITS: 5000 INJECTION, SOLUTION INTRAVENOUS; SUBCUTANEOUS at 14:36

## 2021-01-03 RX ADMIN — HEPARIN SODIUM 5000 UNITS: 5000 INJECTION, SOLUTION INTRAVENOUS; SUBCUTANEOUS at 21:13

## 2021-01-03 RX ADMIN — ZINC SULFATE 220 MG (50 MG) CAPSULE 220 MG: CAPSULE at 14:36

## 2021-01-03 RX ADMIN — SODIUM CHLORIDE 50 ML/HR: 9 INJECTION, SOLUTION INTRAVENOUS at 14:38

## 2021-01-03 RX ADMIN — INSULIN ASPART 5 UNITS: 100 INJECTION, SOLUTION INTRAVENOUS; SUBCUTANEOUS at 17:13

## 2021-01-03 RX ADMIN — BUDESONIDE AND FORMOTEROL FUMARATE DIHYDRATE 2 PUFF: 160; 4.5 AEROSOL RESPIRATORY (INHALATION) at 21:51

## 2021-01-03 RX ADMIN — ALBUTEROL SULFATE 2 PUFF: 90 AEROSOL, METERED RESPIRATORY (INHALATION) at 21:51

## 2021-01-03 RX ADMIN — ACETAMINOPHEN 650 MG: 325 TABLET, FILM COATED ORAL at 15:33

## 2021-01-03 RX ADMIN — ALBUTEROL SULFATE 2 PUFF: 90 AEROSOL, METERED RESPIRATORY (INHALATION) at 16:51

## 2021-01-03 RX ADMIN — Medication 2000 UNITS: at 14:36

## 2021-01-03 RX ADMIN — GUAIFENESIN 1200 MG: 600 TABLET, EXTENDED RELEASE ORAL at 14:36

## 2021-01-03 RX ADMIN — LATANOPROST 1 DROP: 50 SOLUTION OPHTHALMIC at 21:13

## 2021-01-03 RX ADMIN — FOLIC ACID 1 MG: 1 TABLET ORAL at 14:37

## 2021-01-03 RX ADMIN — TIMOLOL MALEATE 1 DROP: 5 SOLUTION OPHTHALMIC at 15:34

## 2021-01-03 NOTE — PLAN OF CARE
Goal Outcome Evaluation:      Pt a new admit to floor, vital signs stable, room air, pt complains of a dry cough and shortness of air with exertion.

## 2021-01-03 NOTE — H&P
[facility] Medicine Services  INPATIENT HISTORY AND PHYSICAL       Patient Care Team:  Yehuda Victor MD as PCP - General    Chief complaint   Chief Complaint   Patient presents with   • Shortness of Breath       Subjective     Patient is a 80 y.o. male with a past medical history of type 2 diabetes mellitus, essential hypertension, pericarditis, CKD stage III. He presented with complaints of headache, fevers and poor appetite of 1 week duration.  He states that his wife had tested positive for Covid 2 weeks ago and has been recovering. However about 1 week ago he developed worsening headache, fevers and cough productive of poor phlegm.  He also had poor appetite during this.  He denies diarrhea, dysuria, abdominal pain, nausea or vomiting.  About 3 days ago he expressed some visual hallucinations while he was watching TV.  He saw some playing cards appear and disappear. He also thought he had a book in his hand but there was no book near him.  He has had no more hallucinations over the past 48 hours.  On account of his symptoms he presented to the emergency room on 1/2/2021 and Covid test was positive. Patient was saturating adequately on room air and was alert and oriented x3. He received Bamlanivimab therapy and was discharged home.  However he returned to the emergency room today with complaints of high fevers and worsening fatigue with shortness of breath.  He also has poor appetite.    Review of Systems   Constitutional: Positive for appetite change, fatigue and fever. Negative for activity change and chills.   HENT: Negative for congestion, rhinorrhea, sore throat and trouble swallowing.    Respiratory: Positive for cough and shortness of breath. Negative for chest tightness and wheezing.    Cardiovascular: Negative for chest pain, palpitations and leg swelling.   Gastrointestinal: Negative for abdominal distention, abdominal pain, diarrhea, nausea and vomiting.     Genitourinary: Negative for difficulty urinating, dysuria and hematuria.   Musculoskeletal: Negative for arthralgias, back pain and myalgias.   Skin: Negative for pallor and rash.   Neurological: Negative for dizziness, syncope, weakness, light-headedness and headaches.   Hematological: Negative for adenopathy. Does not bruise/bleed easily.   Psychiatric/Behavioral: Negative for agitation and confusion. The patient is not nervous/anxious.      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)    • Epiretinal membrane      OS, s/p PPV, doing well      • Exotropia     - intermittent, stable      • Glaucoma suspect    • Hyperlipidemia    • Hypertension    • Nonproliferative diabetic retinopathy (CMS/HCC)     MILD   • Nuclear cataract    • Type 2 diabetes mellitus 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 (CMS/HCC)     EDEMA     Past Surgical History:   Procedure Laterality Date   • CARDIAC CATHETERIZATION     • CATARACT EXTRACTION EXTRACAPSULAR W/ INTRAOCULAR LENS IMPLANTATION     • LAMINOTOMY Left 01/06/1969    Exc HNP 5th lumbar   • OTHER SURGICAL HISTORY  05/31/2016    EXTENDED VISUAL FIELDS STUDY 94970 (Open angle with borderline findings, low risk, unspecified eye   • OTHER SURGICAL HISTORY  04/26/2016    FINDINGS OF DIL MAC OR FUND EXAM COMM TO MD 5010F (Type 2 diabetes mellitus 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   • OTHER SURGICAL HISTORY  04/26/2016    OCT DISC NFL 33357 (Type 2 diabetes mellitus 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)    • OTHER SURGICAL HISTORY  09/30/2015    OCT SCAN RETINA 73275 (Epiretinal membrane   • PERICARDIECTOMY  08/04/1972    Pericarditis etiol undetermined      Family History   Problem Relation Age of Onset   • Heart disease Mother         MI   • Heart disease Father         MI     Social History     Tobacco Use   • Smoking status: Former Smoker   • Smokeless tobacco: Never Used   Substance Use Topics   • Alcohol use: No   • Drug use: No     Medications Prior to Admission   Medication Sig Dispense Refill Last Dose   • albuterol sulfate  (90 Base) MCG/ACT inhaler Inhale 2 puffs Every 4 (Four) Hours As Needed for Wheezing or Shortness of Air. 6.7 g 0 1/2/2021 at Unknown time   • aspirin 81 MG tablet Take 1 tablet by mouth Daily. 30 tablet 11 1/2/2021 at Unknown time   • atorvastatin (LIPITOR) 20 MG tablet Take 20 mg by mouth Daily.   1/2/2021 at Unknown time   • chlorhexidine (PERIDEX) 0.12 % solution Apply 15 mL to the mouth or throat 2 (Two) Times a Day.   1/2/2021 at Unknown time   • Cholecalciferol (Vitamin D3) 10 MCG (400 UNIT) capsule Take  by mouth Daily.   1/2/2021 at Unknown time   • Cinnamon 500 MG tablet Take 1,000 mg by mouth 2 (Two) Times a Day.   1/2/2021 at Unknown time   • clopidogrel (PLAVIX) 75 MG tablet Take 1 tablet by mouth Daily. 30 tablet 11 1/2/2021 at Unknown time   • FLUoxetine (PROzac) 40 MG capsule Take 40 mg by mouth Daily.   1/2/2021 at Unknown time   • folic acid (FOLVITE) 1 MG tablet Take 1 mg by mouth Daily.   1/2/2021 at Unknown time   • glimepiride (AMARYL) 4 MG tablet Take 4 mg by mouth Every Morning Before Breakfast.   1/2/2021 at Unknown time   • irbesartan (AVAPRO) 75 MG tablet Take 75 mg by mouth Every Night.   1/2/2021 at Unknown time   • latanoprost (XALATAN) 0.005 % ophthalmic solution ADMINISTER 1 DROP TO BOTH EYES EVERY NIGHT. 2.5 mL 12 1/2/2021 at Unknown time   • Omega-3 Fatty Acids (FISH OIL) 1000 MG capsule capsule Take  by mouth Daily With Breakfast.   1/2/2021 at Unknown time   • promethazine-dextromethorphan (PROMETHAZINE-DM) 6.25-15 MG/5ML syrup Take 5 mL by mouth 4 (Four) Times a Day As Needed for Cough. 118 mL  0 1/2/2021 at Unknown time   • sitaGLIPtin (JANUVIA) 100 MG tablet Take 100 mg by mouth Daily.   1/2/2021 at Unknown time   • timolol (TIMOPTIC) 0.5 % ophthalmic solution Administer 1 drop to both eyes Take As Directed.   1/2/2021 at Unknown time   • vitamin C (ASCORBIC ACID) 250 MG tablet Take 500 mg by mouth Daily.   1/2/2021 at Unknown time   • Zinc 50 MG capsule Take 50 mg by mouth Daily.   1/2/2021 at Unknown time   • ondansetron ODT (ZOFRAN-ODT) 4 MG disintegrating tablet Place 1 tablet on the tongue Every 8 (Eight) Hours As Needed for Nausea or Vomiting. 10 tablet 0 More than a month at Unknown time     Allergies:  Olmesartan, Lisinopril, and Hydrocodone  Prior to Admission medications    Medication Sig Start Date End Date Taking? Authorizing Provider   albuterol sulfate  (90 Base) MCG/ACT inhaler Inhale 2 puffs Every 4 (Four) Hours As Needed for Wheezing or Shortness of Air. 1/2/21  Yes Chon France MD   aspirin 81 MG tablet Take 1 tablet by mouth Daily.   Yes Satish Conte MD   atorvastatin (LIPITOR) 20 MG tablet Take 20 mg by mouth Daily.   Yes ProviderHubert MD   chlorhexidine (PERIDEX) 0.12 % solution Apply 15 mL to the mouth or throat 2 (Two) Times a Day.   Yes Hubert Boykin MD   Cholecalciferol (Vitamin D3) 10 MCG (400 UNIT) capsule Take  by mouth Daily.   Yes Hubert Boykin MD   Cinnamon 500 MG tablet Take 1,000 mg by mouth 2 (Two) Times a Day.   Yes Hubert Boykin MD   clopidogrel (PLAVIX) 75 MG tablet Take 1 tablet by mouth Daily. 12/21/18  Yes Hamman, Jack L, MD   FLUoxetine (PROzac) 40 MG capsule Take 40 mg by mouth Daily.   Yes Hubert Boykin MD   folic acid (FOLVITE) 1 MG tablet Take 1 mg by mouth Daily.   Yes Hubert Boykin MD   glimepiride (AMARYL) 4 MG tablet Take 4 mg by mouth Every Morning Before Breakfast.   Yes Hubert Boykin MD   irbesartan (AVAPRO) 75 MG tablet Take 75 mg by mouth Every Night.   Yes Ashutosh  MD Hubert   latanoprost (XALATAN) 0.005 % ophthalmic solution ADMINISTER 1 DROP TO BOTH EYES EVERY NIGHT. 8/22/17  Yes Henrry Ramirez MD   Omega-3 Fatty Acids (FISH OIL) 1000 MG capsule capsule Take  by mouth Daily With Breakfast.   Yes ProviderHubert MD   promethazine-dextromethorphan (PROMETHAZINE-DM) 6.25-15 MG/5ML syrup Take 5 mL by mouth 4 (Four) Times a Day As Needed for Cough. 1/2/21  Yes Chon France MD   sitaGLIPtin (JANUVIA) 100 MG tablet Take 100 mg by mouth Daily.   Yes Hubert Boykin MD   timolol (TIMOPTIC) 0.5 % ophthalmic solution Administer 1 drop to both eyes Take As Directed.   Yes Hubert Boykin MD   vitamin C (ASCORBIC ACID) 250 MG tablet Take 500 mg by mouth Daily.   Yes Hubert Boykin MD   Zinc 50 MG capsule Take 50 mg by mouth Daily.   Yes Hubert Boykin MD   azithromycin (ZITHROMAX) 250 MG tablet Take 1 tab po daily x4 days 1/2/21 1/3/21 Yes Chon France MD   ondansetron ODT (ZOFRAN-ODT) 4 MG disintegrating tablet Place 1 tablet on the tongue Every 8 (Eight) Hours As Needed for Nausea or Vomiting. 1/2/21   Chon France MD       I have reviewed the patient's current medications    Objective        Vital Signs  Temp:  [97.2 °F (36.2 °C)-98.5 °F (36.9 °C)] 98.5 °F (36.9 °C)  Heart Rate:  [65-71] 70  Resp:  [20-22] 20  BP: (113-142)/(60-86) 142/60      Physical Exam  Constitutional:       General: He is not in acute distress.     Appearance: Normal appearance. He is well-developed. He is not diaphoretic.   HENT:      Head: Normocephalic.   Eyes:      General: No scleral icterus.     Conjunctiva/sclera: Conjunctivae normal.      Pupils: Pupils are equal, round, and reactive to light.   Neck:      Musculoskeletal: Normal range of motion and neck supple.      Thyroid: No thyromegaly.      Vascular: No JVD.      Trachea: No tracheal deviation.   Cardiovascular:      Rate and Rhythm: Normal rate and regular rhythm.      Heart sounds: Normal heart  sounds. No murmur. No friction rub. No gallop.    Pulmonary:      Effort: Pulmonary effort is normal. No respiratory distress.      Breath sounds: Normal breath sounds. No stridor. No wheezing or rales.   Chest:      Chest wall: No tenderness.   Abdominal:      General: Bowel sounds are normal. There is no distension.      Palpations: Abdomen is soft. There is no mass.      Tenderness: There is no abdominal tenderness. There is no guarding or rebound.      Hernia: No hernia is present.   Musculoskeletal: Normal range of motion.         General: No tenderness or deformity.   Lymphadenopathy:      Cervical: No cervical adenopathy.   Skin:     General: Skin is warm and dry.      Coloration: Skin is not pale.      Findings: No erythema or rash.   Neurological:      Mental Status: He is alert and oriented to person, place, and time.      Cranial Nerves: No cranial nerve deficit.      Sensory: No sensory deficit.      Motor: No abnormal muscle tone.      Coordination: Coordination normal.   Psychiatric:         Behavior: Behavior normal.         Thought Content: Thought content normal.         Judgment: Judgment normal.       Results Review:     Results from last 7 days   Lab Units 01/03/21  0954 01/02/21  1413   SODIUM mmol/L 135* 135*   POTASSIUM mmol/L 4.2 4.5   CHLORIDE mmol/L 99 97*   CO2 mmol/L 24.0 26.0   BUN mg/dL 25* 30*   CREATININE mg/dL 1.63* 2.04*   GLUCOSE mg/dL 183* 296*   CALCIUM mg/dL 9.0 9.3   BILIRUBIN mg/dL 0.4 0.4   ALK PHOS U/L 67 80   ALT (SGPT) U/L 16 18   AST (SGOT) U/L 21 19       Results from last 7 days   Lab Units 01/03/21  0954   MAGNESIUM mg/dL 1.6       Results from last 7 days   Lab Units 01/03/21  0954 01/02/21  1413   WBC 10*3/mm3 7.02 4.96   HEMOGLOBIN g/dL 12.5* 13.4   HEMATOCRIT % 36.8* 40.4   PLATELETS 10*3/mm3 154 150           Imaging Results (Last 7 Days)     Procedure Component Value Units Date/Time    XR Chest 1 View [[id number]] Collected: 01/03/21 0939     Updated: 01/03/21  1000    Narrative:      Chest x-ray single view.       CLINICAL INDICATION: Shortness of breath. COVID Positive    COMPARISON: Chest January 2, 2021    FINDINGS: Cardiac silhouette is enlarged in size. Pulmonary  vascularity is unremarkable.   There are midline sternal sutures from prior cardiac surgery.    There are bilateral infiltrative changes more pronounced on the  left than on the right. No significant changes since prior exam.      Impression:      Bilateral infiltrative changes more so on the left  than on the right, bilateral pneumonitis. No change since prior  exam.    Electronically signed by:  Bubba Hamptno MD  1/3/2021 9:59 AM CST  Workstation: [id number]          Assessment / Plan       Hospital Problem List:  Principal Problem:    Pneumonia due to COVID-19 virus  Essential hypertension  Type 2 diabetes mellitus  CKD stage III    Plan  -Patient returns with worsening fatigue and shortness of breath after bamlanivimab therapy.  He is positive for COVID-19 and is at high risk of decompensation given his age and comorbidities including diabetes mellitus.  -We will admit patient and start Decadron therapy as CRP is elevated.  -Follow-up blood cultures.  Procalcitonin is negative will hold off antibiotics at this time  -Start Symbicort, albuterol inhaler, vitamin C, vitamin D, zinc therapy  -Gentle IV fluid hydration with normal saline at 50 cc an hour  -Start p.o. linagliptin for type 2 diabetes mellitus and NovoLog sliding scale  -Monitor renal function  -DVT prophylaxis with subcutaneous heparin  -CODE STATUS is full code    The patient was evaluated during the global COVID-19 pandemic, and the diagnosis was suspected/considered upon their initial presentation.  Evaluation, treatment, and testing were consistent with current guidelines for patients who present with complaints or symptoms that may be related to COVID-19.      I discussed the patient's findings and my recommendations with patient.    Sotonte  RACHELL Beverly MD  01/03/21  15:09 CST        Part of this note may be an electronic transcription/translation of spoken language to printed text using the Dragon Dictation System.

## 2021-01-03 NOTE — OUTREACH NOTE
COVID-19 Week 1 Survey      Responses   [facility] patient discharged from?  [facility]   Does the patient have one of the following disease processes/diagnoses(primary or secondary)?  COVID-19   COVID-19 underlying condition?  None   Call Number  Call 1   Week 1 Call successful?  Yes   Call start time  0941   Call end time  0951   Discharge diagnosis  Covid 19 PNA   Meds reviewed with patient/caregiver?  Yes   Does the patient have all medications ordered at discharge?  Yes   Does the patient have a primary care provider?   Yes   Does the patient have an appointment with their PCP or specialist within 7 days of discharge?  No   What is preventing the patient from scheduling follow up appointments within 7 days of discharge?  Haven't had time   Nursing Interventions  Advised patient to make appointment   Has the patient kept scheduled appointments due by today?  N/A   Has home health visited the patient within 72 hours of discharge?  N/A   Psychosocial issues?  No   Did the patient receive a copy of their discharge instructions?  Yes   Did the patient receive a copy of COVID-19 specific instructions?  Yes   Nursing interventions  Reviewed instructions with patient   What is the patient's perception of their health status since discharge?  Worsening   Does the patient have any of the following symptoms?  Fever/chills, Shortness of breath, Cough   Nursing Interventions  Nurse provided patient education   Pulse Ox monitoring  Intermittent   Pulse Ox device source  Patient   O2 Sat comments  91-93% on RA.    O2 Sat: education provided  Sat levels, Monitoring frequency, When to seek care   Is the patient/caregiver able to teach back steps to recovery at home?  Set small, achievable goals for return to baseline health   If the patient is a current smoker, are they able to teach back resources for cessation?  Not a smoker   Is the patient/caregiver able to teach back the hierarchy of who to call/visit for  symptoms/problems? PCP, Specialist, Home health nurse, Urgent Care, ED, 911  Yes   COVID-19 call completed?  Yes   Wrap up additional comments  Pt reports he is feeling worse this morning, and running a fever of 104. Wife reports temp decreased after giving Tylenol. Pt was having difficulty speaking in complete sentence. O2 satuuration 93% on RA. Advised pt to return to the ER for evaluation. I also spoke with the pt's wife and reinforced the need for evaluation in the ER. She stated she would take him.          Angel Cruz RN

## 2021-01-03 NOTE — ED PROVIDER NOTES
Subjective   Pt tested + for COVID yesterday. SOA x 1 week.  Patient states he is having difficulty walking at home secondary to shortness of breath.      Shortness of Breath  Severity:  Moderate  Onset quality:  Gradual  Duration:  1 week  Timing:  Constant  Progression:  Waxing and waning  Chronicity:  New  Relieved by:  Rest  Worsened by:  Deep breathing, activity and coughing  Associated symptoms: cough, fever and headaches    Associated symptoms: no abdominal pain, no chest pain, no diaphoresis, no ear pain, no neck pain, no rash, no sore throat, no vomiting and no wheezing    Risk factors: no tobacco use        Review of Systems   Constitutional: Positive for activity change and fever. Negative for appetite change, chills, diaphoresis and fatigue.   HENT: Negative for congestion, ear discharge, ear pain, nosebleeds, rhinorrhea, sinus pressure, sore throat and trouble swallowing.    Eyes: Negative for discharge and redness.   Respiratory: Positive for cough and shortness of breath. Negative for apnea, chest tightness and wheezing.    Cardiovascular: Negative for chest pain.   Gastrointestinal: Negative for abdominal pain, diarrhea, nausea and vomiting.   Endocrine: Negative for polyuria.   Genitourinary: Negative for dysuria, frequency and urgency.   Musculoskeletal: Negative for myalgias and neck pain.   Skin: Negative for color change and rash.   Allergic/Immunologic: Negative for immunocompromised state.   Neurological: Positive for headaches. Negative for dizziness, seizures, syncope, weakness and light-headedness.   Hematological: Negative for adenopathy. Does not bruise/bleed easily.   Psychiatric/Behavioral: Negative for behavioral problems and confusion.   All other systems reviewed and are negative.      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 He requires branded Benicar   • Lisinopril Cough     cough   • Hydrocodone Cough       Past Surgical History:   Procedure Laterality Date   • CARDIAC CATHETERIZATION     • CATARACT EXTRACTION EXTRACAPSULAR W/ INTRAOCULAR LENS IMPLANTATION     • LAMINOTOMY Left 01/06/1969    Exc HNP 5th lumbar   • OTHER SURGICAL HISTORY  05/31/2016    EXTENDED VISUAL FIELDS STUDY 51186 (Open angle with borderline findings, low risk, unspecified eye   • OTHER SURGICAL HISTORY  04/26/2016    FINDINGS OF DIL MAC OR FUND EXAM COMM TO MD 5010F (Type 2 diabetes mellitus 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   • OTHER SURGICAL HISTORY  04/26/2016    OCT DISC NFL 71808 (Type 2 diabetes mellitus 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)    • OTHER SURGICAL HISTORY  09/30/2015    OCT SCAN RETINA 81304 (Epiretinal membrane   • PERICARDIECTOMY  08/04/1972    Pericarditis etiol undetermined       Family History   Problem Relation Age of Onset   • Heart disease Mother         MI   • Heart disease Father         MI       Social History     Socioeconomic History   • Marital status:      Spouse name: Not on file   • Number of children: Not on file   • Years of education: Not on file   • Highest education level: Not on file   Tobacco Use   • Smoking status: Former Smoker   • Smokeless tobacco: Never Used   Substance and Sexual  Activity   • Alcohol use: No   • Drug use: No   • Sexual activity: Defer           Objective   Physical Exam  Vitals signs and nursing note reviewed.   Constitutional:       Appearance: He is well-developed.   HENT:      Head: Normocephalic and atraumatic.      Nose: Nose normal.   Eyes:      General: No scleral icterus.        Right eye: No discharge.         Left eye: No discharge.      Conjunctiva/sclera: Conjunctivae normal.      Pupils: Pupils are equal, round, and reactive to light.   Neck:      Musculoskeletal: Normal range of motion and neck supple.      Trachea: No tracheal deviation.   Cardiovascular:      Rate and Rhythm: Normal rate and regular rhythm.      Heart sounds: Normal heart sounds. No murmur.   Pulmonary:      Effort: Pulmonary effort is normal. No respiratory distress.      Breath sounds: Normal breath sounds. No stridor. No wheezing or rales.   Abdominal:      General: Bowel sounds are normal. There is no distension.      Palpations: Abdomen is soft. There is no mass.      Tenderness: There is no abdominal tenderness. There is no guarding or rebound.   Skin:     General: Skin is warm and dry.      Findings: No erythema or rash.   Neurological:      Mental Status: He is alert and oriented to person, place, and time.      Coordination: Coordination normal.   Psychiatric:         Behavior: Behavior normal.         Thought Content: Thought content normal.         ECG 12 Lead      Date/Time: 1/3/2021 11:18 AM  Performed by: Ez Bryson MD  Authorized by: Ez Bryson MD   Interpreted by physician  Rhythm: sinus rhythm  Rate: normal  BPM: 68  ST Segments: ST segments normal                   ED Course                   Labs Reviewed   COMPREHENSIVE METABOLIC PANEL - Abnormal; Notable for the following components:       Result Value    Glucose 183 (*)     BUN 25 (*)     Creatinine 1.63 (*)     Sodium 135 (*)     Albumin 3.40 (*)     eGFR Non  Amer 41 (*)     All other  components within normal limits    Narrative:     GFR Normal >60  Chronic Kidney Disease <60  Kidney Failure <15     CBC WITH AUTO DIFFERENTIAL - Abnormal; Notable for the following components:    RBC 4.01 (*)     Hemoglobin 12.5 (*)     Hematocrit 36.8 (*)     Lymphocyte % 19.4 (*)     Immature Grans % 0.7 (*)     All other components within normal limits   BLOOD CULTURE   BLOOD CULTURE   LACTIC ACID, PLASMA   CBC AND DIFFERENTIAL    Narrative:     The following orders were created for panel order CBC & Differential.  Procedure                               Abnormality         Status                     ---------                               -----------         ------                     CBC Auto Differential[[id number]]        Abnormal            Final result                 Please view results for these tests on the individual orders.   EXTRA TUBES    Narrative:     The following orders were created for panel order Extra Tubes.  Procedure                               Abnormality         Status                     ---------                               -----------         ------                     Light Blue Top[[id number]]                                   Final result               Gold Top - SST[[id number]]                                   Final result                 Please view results for these tests on the individual orders.   LIGHT BLUE TOP   GOLD TOP - SST       XR Chest 1 View   Final Result   Bilateral infiltrative changes more so on the left   than on the right, bilateral pneumonitis. No change since prior   exam.      Electronically signed by:  Bubba Hampton MD  1/3/2021 9:59 AM [facility]   Workstation: [id number]                                       [facility]    Final diagnoses:   Pneumonia due to COVID-19 virus            Ez Bryson MD  01/03/21 1108       Ez Bryson MD  01/03/21 1127

## 2021-01-03 NOTE — OUTREACH NOTE
Prep Survey      Responses   [facility] facility patient discharged from?  [place]   Is LACE score < 7 ?  Yes   Emergency Room discharge w/ pulse ox?  No   Eligibility  Readm Mgmt   Discharge diagnosis  Covid 19 PNA   Does the patient have one of the following disease processes/diagnoses(primary or secondary)?  COVID-19   Does the patient have Home health ordered?  No   Is there a DME ordered?  No   Prep survey completed?  Yes          Betsy Sales RN

## 2021-01-04 LAB
ANION GAP SERPL CALCULATED.3IONS-SCNC: 7 MMOL/L (ref 5–15)
BASOPHILS # BLD AUTO: 0 10*3/MM3 (ref 0–0.2)
BASOPHILS NFR BLD AUTO: 0 % (ref 0–1.5)
BUN SERPL-MCNC: 30 MG/DL (ref 8–23)
BUN/CREAT SERPL: 19.1 (ref 7–25)
CALCIUM SPEC-SCNC: 8.9 MG/DL (ref 8.6–10.5)
CHLORIDE SERPL-SCNC: 102 MMOL/L (ref 98–107)
CO2 SERPL-SCNC: 24 MMOL/L (ref 22–29)
CREAT SERPL-MCNC: 1.57 MG/DL (ref 0.76–1.27)
DEPRECATED RDW RBC AUTO: 41.2 FL (ref 37–54)
EOSINOPHIL # BLD AUTO: 0 10*3/MM3 (ref 0–0.4)
EOSINOPHIL NFR BLD AUTO: 0 % (ref 0.3–6.2)
ERYTHROCYTE [DISTWIDTH] IN BLOOD BY AUTOMATED COUNT: 12.3 % (ref 12.3–15.4)
GFR SERPL CREATININE-BSD FRML MDRD: 43 ML/MIN/1.73
GLUCOSE BLDC GLUCOMTR-MCNC: 326 MG/DL (ref 70–130)
GLUCOSE BLDC GLUCOMTR-MCNC: 347 MG/DL (ref 70–130)
GLUCOSE BLDC GLUCOMTR-MCNC: 363 MG/DL (ref 70–130)
GLUCOSE BLDC GLUCOMTR-MCNC: 376 MG/DL (ref 70–130)
GLUCOSE SERPL-MCNC: 382 MG/DL (ref 65–99)
HCT VFR BLD AUTO: 34.8 % (ref 37.5–51)
HGB BLD-MCNC: 11.6 G/DL (ref 13–17.7)
IMM GRANULOCYTES # BLD AUTO: 0.02 10*3/MM3 (ref 0–0.05)
IMM GRANULOCYTES NFR BLD AUTO: 0.8 % (ref 0–0.5)
LYMPHOCYTES # BLD AUTO: 0.7 10*3/MM3 (ref 0.7–3.1)
LYMPHOCYTES NFR BLD AUTO: 26.5 % (ref 19.6–45.3)
MCH RBC QN AUTO: 30.7 PG (ref 26.6–33)
MCHC RBC AUTO-ENTMCNC: 33.3 G/DL (ref 31.5–35.7)
MCV RBC AUTO: 92.1 FL (ref 79–97)
MONOCYTES # BLD AUTO: 0.13 10*3/MM3 (ref 0.1–0.9)
MONOCYTES NFR BLD AUTO: 4.9 % (ref 5–12)
NEUTROPHILS NFR BLD AUTO: 1.79 10*3/MM3 (ref 1.7–7)
NEUTROPHILS NFR BLD AUTO: 67.8 % (ref 42.7–76)
NRBC BLD AUTO-RTO: 0 /100 WBC (ref 0–0.2)
PLATELET # BLD AUTO: 166 10*3/MM3 (ref 140–450)
PMV BLD AUTO: 11.2 FL (ref 6–12)
POTASSIUM SERPL-SCNC: 4.7 MMOL/L (ref 3.5–5.2)
RBC # BLD AUTO: 3.78 10*6/MM3 (ref 4.14–5.8)
SODIUM SERPL-SCNC: 133 MMOL/L (ref 136–145)
WBC # BLD AUTO: 2.64 10*3/MM3 (ref 3.4–10.8)

## 2021-01-04 PROCEDURE — 63710000001 INSULIN ASPART PER 5 UNITS: Performed by: INTERNAL MEDICINE

## 2021-01-04 PROCEDURE — 85025 COMPLETE CBC W/AUTO DIFF WBC: CPT | Performed by: INTERNAL MEDICINE

## 2021-01-04 PROCEDURE — 25010000002 HEPARIN (PORCINE) PER 1000 UNITS: Performed by: INTERNAL MEDICINE

## 2021-01-04 PROCEDURE — 96376 TX/PRO/DX INJ SAME DRUG ADON: CPT

## 2021-01-04 PROCEDURE — G0378 HOSPITAL OBSERVATION PER HR: HCPCS

## 2021-01-04 PROCEDURE — 96372 THER/PROPH/DIAG INJ SC/IM: CPT

## 2021-01-04 PROCEDURE — 25010000002 DEXAMETHASONE PER 1 MG: Performed by: INTERNAL MEDICINE

## 2021-01-04 PROCEDURE — 82962 GLUCOSE BLOOD TEST: CPT

## 2021-01-04 PROCEDURE — 96361 HYDRATE IV INFUSION ADD-ON: CPT

## 2021-01-04 PROCEDURE — 80048 BASIC METABOLIC PNL TOTAL CA: CPT | Performed by: INTERNAL MEDICINE

## 2021-01-04 PROCEDURE — 94799 UNLISTED PULMONARY SVC/PX: CPT

## 2021-01-04 RX ADMIN — GUAIFENESIN 1200 MG: 600 TABLET, EXTENDED RELEASE ORAL at 21:10

## 2021-01-04 RX ADMIN — INSULIN ASPART 10 UNITS: 100 INJECTION, SOLUTION INTRAVENOUS; SUBCUTANEOUS at 11:58

## 2021-01-04 RX ADMIN — BUDESONIDE AND FORMOTEROL FUMARATE DIHYDRATE 2 PUFF: 160; 4.5 AEROSOL RESPIRATORY (INHALATION) at 20:52

## 2021-01-04 RX ADMIN — OXYCODONE HYDROCHLORIDE AND ACETAMINOPHEN 500 MG: 500 TABLET ORAL at 08:38

## 2021-01-04 RX ADMIN — ATORVASTATIN CALCIUM 20 MG: 20 TABLET, FILM COATED ORAL at 08:38

## 2021-01-04 RX ADMIN — ASPIRIN 81 MG: 81 TABLET, COATED ORAL at 08:38

## 2021-01-04 RX ADMIN — ALBUTEROL SULFATE 2 PUFF: 90 AEROSOL, METERED RESPIRATORY (INHALATION) at 15:42

## 2021-01-04 RX ADMIN — FLUOXETINE 40 MG: 20 CAPSULE ORAL at 08:38

## 2021-01-04 RX ADMIN — INSULIN ASPART 12 UNITS: 100 INJECTION, SOLUTION INTRAVENOUS; SUBCUTANEOUS at 08:38

## 2021-01-04 RX ADMIN — LINAGLIPTIN 5 MG: 5 TABLET, FILM COATED ORAL at 08:38

## 2021-01-04 RX ADMIN — Medication 2000 UNITS: at 08:38

## 2021-01-04 RX ADMIN — TIMOLOL MALEATE 1 DROP: 5 SOLUTION OPHTHALMIC at 08:39

## 2021-01-04 RX ADMIN — ALBUTEROL SULFATE 2 PUFF: 90 AEROSOL, METERED RESPIRATORY (INHALATION) at 07:54

## 2021-01-04 RX ADMIN — HEPARIN SODIUM 5000 UNITS: 5000 INJECTION, SOLUTION INTRAVENOUS; SUBCUTANEOUS at 21:10

## 2021-01-04 RX ADMIN — DEXAMETHASONE SODIUM PHOSPHATE 6 MG: 4 INJECTION, SOLUTION INTRAMUSCULAR; INTRAVENOUS at 08:38

## 2021-01-04 RX ADMIN — LATANOPROST 1 DROP: 50 SOLUTION OPHTHALMIC at 21:10

## 2021-01-04 RX ADMIN — SODIUM CHLORIDE, PRESERVATIVE FREE 10 ML: 5 INJECTION INTRAVENOUS at 08:38

## 2021-01-04 RX ADMIN — HEPARIN SODIUM 5000 UNITS: 5000 INJECTION, SOLUTION INTRAVENOUS; SUBCUTANEOUS at 04:51

## 2021-01-04 RX ADMIN — GUAIFENESIN 1200 MG: 600 TABLET, EXTENDED RELEASE ORAL at 08:37

## 2021-01-04 RX ADMIN — HEPARIN SODIUM 5000 UNITS: 5000 INJECTION, SOLUTION INTRAVENOUS; SUBCUTANEOUS at 13:40

## 2021-01-04 RX ADMIN — ZINC SULFATE 220 MG (50 MG) CAPSULE 220 MG: CAPSULE at 08:38

## 2021-01-04 RX ADMIN — FOLIC ACID 1 MG: 1 TABLET ORAL at 08:38

## 2021-01-04 RX ADMIN — ALBUTEROL SULFATE 2 PUFF: 90 AEROSOL, METERED RESPIRATORY (INHALATION) at 20:52

## 2021-01-04 RX ADMIN — BUDESONIDE AND FORMOTEROL FUMARATE DIHYDRATE 2 PUFF: 160; 4.5 AEROSOL RESPIRATORY (INHALATION) at 07:54

## 2021-01-04 RX ADMIN — ALBUTEROL SULFATE 2 PUFF: 90 AEROSOL, METERED RESPIRATORY (INHALATION) at 12:42

## 2021-01-04 RX ADMIN — CLOPIDOGREL BISULFATE 75 MG: 75 TABLET ORAL at 08:38

## 2021-01-04 RX ADMIN — INSULIN ASPART 10 UNITS: 100 INJECTION, SOLUTION INTRAVENOUS; SUBCUTANEOUS at 17:17

## 2021-01-04 NOTE — PROGRESS NOTES
[facility] Medicine Services  INPATIENT PROGRESS NOTE    Length of Stay: 1  Date of Admission: 1/3/2021  Primary Care Physician: Yehuda Victor MD    Subjective   Chief Complaint: Shortness of breath    HPI: Patient states he feels some better today.  Shortness of breath is improving.    Review of Systems   Constitutional: Positive for activity change and fatigue. Negative for appetite change, chills and fever.   Respiratory: Positive for cough and shortness of breath. Negative for chest tightness.    Cardiovascular: Negative for chest pain, palpitations and leg swelling.   Gastrointestinal: Negative for abdominal pain, constipation, diarrhea, nausea and vomiting.   Skin: Negative for wound.   Neurological: Positive for weakness. Negative for dizziness, light-headedness, numbness and headaches.        All pertinent negatives and positives are as above. All other systems have been reviewed and are negative unless otherwise stated.     Objective    Temp:  [96.6 °F (35.9 °C)-100.8 °F (38.2 °C)] 97.8 °F (36.6 °C)  Heart Rate:  [55-72] 68  Resp:  [16-20] 16  BP: (115-155)/(60-74) 148/65    Physical Exam  Vitals signs reviewed.   Constitutional:       Appearance: He is well-developed.   HENT:      Head: Normocephalic and atraumatic.   Eyes:      Pupils: Pupils are equal, round, and reactive to light.   Neck:      Musculoskeletal: Normal range of motion and neck supple.   Cardiovascular:      Rate and Rhythm: Normal rate and regular rhythm.      Heart sounds: Normal heart sounds. No murmur. No friction rub. No gallop.    Pulmonary:      Effort: Pulmonary effort is normal. No respiratory distress.      Breath sounds: Decreased breath sounds present. No wheezing or rales.   Chest:      Chest wall: No tenderness.   Abdominal:      General: Bowel sounds are normal. There is no distension.      Palpations: Abdomen is soft.      Tenderness: There is no abdominal tenderness.      Psychiatric:         Behavior: Behavior normal.       Results Review:  I have reviewed the labs, radiology results, and diagnostic studies.    Laboratory Data:   Results from last 7 days   Lab Units 01/04/21  0510 01/03/21  0954 01/02/21  1413   SODIUM mmol/L 133* 135* 135*   POTASSIUM mmol/L 4.7 4.2 4.5   CHLORIDE mmol/L 102 99 97*   CO2 mmol/L 24.0 24.0 26.0   BUN mg/dL 30* 25* 30*   CREATININE mg/dL 1.57* 1.63* 2.04*   GLUCOSE mg/dL 382* 183* 296*   CALCIUM mg/dL 8.9 9.0 9.3   BILIRUBIN mg/dL  --  0.4 0.4   ALK PHOS U/L  --  67 80   ALT (SGPT) U/L  --  16 18   AST (SGOT) U/L  --  21 19   ANION GAP mmol/L 7.0 12.0 12.0     Estimated Creatinine Clearance: 40.2 mL/min (A) (by C-G formula based on SCr of 1.57 mg/dL (H)).  Results from last 7 days   Lab Units 01/03/21  0954   MAGNESIUM mg/dL 1.6         Results from last 7 days   Lab Units 01/04/21  0510 01/03/21  0954 01/02/21  1413   WBC 10*3/mm3 2.64* 7.02 4.96   HEMOGLOBIN g/dL 11.6* 12.5* 13.4   HEMATOCRIT % 34.8* 36.8* 40.4   PLATELETS 10*3/mm3 166 154 150           Culture Data:   Blood Culture   Date Value Ref Range Status   01/03/2021 No growth at 24 hours  Preliminary   01/03/2021 No growth at 24 hours  Preliminary   01/02/2021 No growth at 2 days  Preliminary     No results found for: URINECX  No results found for: RESPCX  No results found for: WOUNDCX  No results found for: STOOLCX  No components found for: BODYFLD    Radiology Data:   Imaging Results (Last 24 Hours)     ** No results found for the last 24 hours. **          I have reviewed the patient's current medications.     Assessment/Plan     Active Hospital Problems    Diagnosis   • **Pneumonia due to COVID-19 virus       Plan:    1.  COVID-19: Patient received bamlanivimab prior to admission.  Currently on Decadron.  Feeling some better.  Less short of breath.  Procalcitonin negative.  Continue bronchodilators.  2.  Diabetes mellitus: Continue Tradjenta and sliding scale insulin.  3.   Hypertension: Continue home medications.  4.  Chronic kidney disease: Renal function is improving.  5.  DVT prophylaxis: Heparin.          The patient was evaluated during the global COVID-19 pandemic, and the diagnosis was suspected/considered upon their initial presentation.  Evaluation, treatment, and testing were consistent with current guidelines for patients who present with complaints or symptoms that may be related to COVID-19.    Discharge Planning: I expect patient to be discharged to home in 1-2 days.        This document has been electronically signed by Ton Vital MD on January 4, 2021 16:23 CST

## 2021-01-04 NOTE — PROGRESS NOTES
Discharge Planning Assessment  [facility]     Patient Name: David Chacon  MRN: [id number]  Today's Date: 1/4/2021    Admit Date: 1/3/2021    Discharge Needs Assessment     Row Name 01/04/21 1512       Living Environment    Lives With  spouse    Current Living Arrangements  home/apartment/condo    Primary Care Provided by  self    Provides Primary Care For  no one    Family Caregiver if Needed  spouse    Quality of Family Relationships  helpful    Able to Return to Prior Arrangements  yes       Resource/Environmental Concerns    Resource/Environmental Concerns  none    Transportation Concerns  car, none       Transition Planning    Patient/Family Anticipates Transition to  home with family    Transportation Anticipated  family or friend will provide       Discharge Needs Assessment    Equipment Currently Used at Home  cane, straight;walker, rolling has avialable  does not use    Concerns to be Addressed  denies needs/concerns at this time    Discharge Coordination/Progress  has equipment of cane and walker that he has avaiable at home.  does not normally use.   has rx coverage and transportation,  independent prior to coming in.   states  no needs for dc        Discharge Plan    No documentation.       Continued Care and Services - Admitted Since 1/3/2021    Coordination has not been started for this encounter.         Demographic Summary     Row Name 01/04/21 1512       General Information    Admission Type  observation    Arrived From  home    Required Notices Provided  Observation Status Notice    Referral Source  high risk screening    Reason for Consult  discharge planning    Preferred Language  English     Used During This Interaction  no    General Information Comments  confirmed face sheet and pharmacy        Functional Status    No documentation.       Psychosocial    No documentation.       Abuse/Neglect    No documentation.       Legal    No documentation.       Substance Abuse     No documentation.       Patient Forms    No documentation.           Luzma De Jesus RN

## 2021-01-04 NOTE — PLAN OF CARE
Goal Outcome Evaluation:  Plan of Care Reviewed With: patient  Progress: no change  Outcome Summary: Pt denies pain at this time, vital signs stabe, pt remains on room air, IV fluids infusing.

## 2021-01-05 ENCOUNTER — APPOINTMENT (OUTPATIENT)
Dept: GENERAL RADIOLOGY | Facility: HOSPITAL | Age: 81
End: 2021-01-05

## 2021-01-05 LAB
ANION GAP SERPL CALCULATED.3IONS-SCNC: 7 MMOL/L (ref 5–15)
BASOPHILS # BLD AUTO: 0 10*3/MM3 (ref 0–0.2)
BASOPHILS NFR BLD AUTO: 0 % (ref 0–1.5)
BUN SERPL-MCNC: 33 MG/DL (ref 8–23)
BUN/CREAT SERPL: 23.2 (ref 7–25)
CALCIUM SPEC-SCNC: 9.2 MG/DL (ref 8.6–10.5)
CHLORIDE SERPL-SCNC: 104 MMOL/L (ref 98–107)
CO2 SERPL-SCNC: 24 MMOL/L (ref 22–29)
CREAT SERPL-MCNC: 1.42 MG/DL (ref 0.76–1.27)
DEPRECATED RDW RBC AUTO: 41.5 FL (ref 37–54)
EOSINOPHIL # BLD AUTO: 0 10*3/MM3 (ref 0–0.4)
EOSINOPHIL NFR BLD AUTO: 0 % (ref 0.3–6.2)
ERYTHROCYTE [DISTWIDTH] IN BLOOD BY AUTOMATED COUNT: 12.2 % (ref 12.3–15.4)
GFR SERPL CREATININE-BSD FRML MDRD: 48 ML/MIN/1.73
GLUCOSE BLDC GLUCOMTR-MCNC: 271 MG/DL (ref 70–130)
GLUCOSE BLDC GLUCOMTR-MCNC: 325 MG/DL (ref 70–130)
GLUCOSE BLDC GLUCOMTR-MCNC: 368 MG/DL (ref 70–130)
GLUCOSE BLDC GLUCOMTR-MCNC: 372 MG/DL (ref 70–130)
GLUCOSE BLDC GLUCOMTR-MCNC: 382 MG/DL (ref 70–130)
GLUCOSE SERPL-MCNC: 352 MG/DL (ref 65–99)
HCT VFR BLD AUTO: 35.8 % (ref 37.5–51)
HGB BLD-MCNC: 12 G/DL (ref 13–17.7)
IMM GRANULOCYTES # BLD AUTO: 0.04 10*3/MM3 (ref 0–0.05)
IMM GRANULOCYTES NFR BLD AUTO: 0.5 % (ref 0–0.5)
LYMPHOCYTES # BLD AUTO: 0.98 10*3/MM3 (ref 0.7–3.1)
LYMPHOCYTES NFR BLD AUTO: 12.7 % (ref 19.6–45.3)
MCH RBC QN AUTO: 31.1 PG (ref 26.6–33)
MCHC RBC AUTO-ENTMCNC: 33.5 G/DL (ref 31.5–35.7)
MCV RBC AUTO: 92.7 FL (ref 79–97)
MONOCYTES # BLD AUTO: 0.52 10*3/MM3 (ref 0.1–0.9)
MONOCYTES NFR BLD AUTO: 6.7 % (ref 5–12)
NEUTROPHILS NFR BLD AUTO: 6.18 10*3/MM3 (ref 1.7–7)
NEUTROPHILS NFR BLD AUTO: 80.1 % (ref 42.7–76)
NRBC BLD AUTO-RTO: 0 /100 WBC (ref 0–0.2)
PLATELET # BLD AUTO: 202 10*3/MM3 (ref 140–450)
PMV BLD AUTO: 11.5 FL (ref 6–12)
POTASSIUM SERPL-SCNC: 5.1 MMOL/L (ref 3.5–5.2)
RBC # BLD AUTO: 3.86 10*6/MM3 (ref 4.14–5.8)
SODIUM SERPL-SCNC: 135 MMOL/L (ref 136–145)
WBC # BLD AUTO: 7.72 10*3/MM3 (ref 3.4–10.8)

## 2021-01-05 PROCEDURE — 85025 COMPLETE CBC W/AUTO DIFF WBC: CPT | Performed by: INTERNAL MEDICINE

## 2021-01-05 PROCEDURE — 25010000002 HEPARIN (PORCINE) PER 1000 UNITS: Performed by: INTERNAL MEDICINE

## 2021-01-05 PROCEDURE — 63710000001 INSULIN ASPART PER 5 UNITS: Performed by: INTERNAL MEDICINE

## 2021-01-05 PROCEDURE — 96376 TX/PRO/DX INJ SAME DRUG ADON: CPT

## 2021-01-05 PROCEDURE — 71045 X-RAY EXAM CHEST 1 VIEW: CPT

## 2021-01-05 PROCEDURE — 25010000002 DEXAMETHASONE PER 1 MG: Performed by: INTERNAL MEDICINE

## 2021-01-05 PROCEDURE — G0378 HOSPITAL OBSERVATION PER HR: HCPCS

## 2021-01-05 PROCEDURE — 96361 HYDRATE IV INFUSION ADD-ON: CPT

## 2021-01-05 PROCEDURE — 94799 UNLISTED PULMONARY SVC/PX: CPT

## 2021-01-05 PROCEDURE — 80048 BASIC METABOLIC PNL TOTAL CA: CPT | Performed by: INTERNAL MEDICINE

## 2021-01-05 PROCEDURE — 82962 GLUCOSE BLOOD TEST: CPT

## 2021-01-05 PROCEDURE — 96372 THER/PROPH/DIAG INJ SC/IM: CPT

## 2021-01-05 PROCEDURE — 94760 N-INVAS EAR/PLS OXIMETRY 1: CPT

## 2021-01-05 PROCEDURE — 94640 AIRWAY INHALATION TREATMENT: CPT

## 2021-01-05 RX ADMIN — HEPARIN SODIUM 5000 UNITS: 5000 INJECTION, SOLUTION INTRAVENOUS; SUBCUTANEOUS at 06:12

## 2021-01-05 RX ADMIN — TIMOLOL MALEATE 1 DROP: 5 SOLUTION OPHTHALMIC at 08:13

## 2021-01-05 RX ADMIN — INSULIN ASPART 12 UNITS: 100 INJECTION, SOLUTION INTRAVENOUS; SUBCUTANEOUS at 08:12

## 2021-01-05 RX ADMIN — ALBUTEROL SULFATE 2 PUFF: 90 AEROSOL, METERED RESPIRATORY (INHALATION) at 07:41

## 2021-01-05 RX ADMIN — FLUOXETINE 40 MG: 20 CAPSULE ORAL at 08:11

## 2021-01-05 RX ADMIN — ASPIRIN 81 MG: 81 TABLET, COATED ORAL at 08:11

## 2021-01-05 RX ADMIN — INSULIN ASPART 12 UNITS: 100 INJECTION, SOLUTION INTRAVENOUS; SUBCUTANEOUS at 11:24

## 2021-01-05 RX ADMIN — ZINC SULFATE 220 MG (50 MG) CAPSULE 220 MG: CAPSULE at 08:11

## 2021-01-05 RX ADMIN — GUAIFENESIN 1200 MG: 600 TABLET, EXTENDED RELEASE ORAL at 20:45

## 2021-01-05 RX ADMIN — LINAGLIPTIN 5 MG: 5 TABLET, FILM COATED ORAL at 08:12

## 2021-01-05 RX ADMIN — SODIUM CHLORIDE, PRESERVATIVE FREE 10 ML: 5 INJECTION INTRAVENOUS at 08:12

## 2021-01-05 RX ADMIN — FOLIC ACID 1 MG: 1 TABLET ORAL at 08:11

## 2021-01-05 RX ADMIN — ALBUTEROL SULFATE 2 PUFF: 90 AEROSOL, METERED RESPIRATORY (INHALATION) at 20:03

## 2021-01-05 RX ADMIN — BUDESONIDE AND FORMOTEROL FUMARATE DIHYDRATE 2 PUFF: 160; 4.5 AEROSOL RESPIRATORY (INHALATION) at 20:04

## 2021-01-05 RX ADMIN — HEPARIN SODIUM 5000 UNITS: 5000 INJECTION, SOLUTION INTRAVENOUS; SUBCUTANEOUS at 20:45

## 2021-01-05 RX ADMIN — CLOPIDOGREL BISULFATE 75 MG: 75 TABLET ORAL at 08:11

## 2021-01-05 RX ADMIN — INSULIN ASPART 10 UNITS: 100 INJECTION, SOLUTION INTRAVENOUS; SUBCUTANEOUS at 23:04

## 2021-01-05 RX ADMIN — DEXAMETHASONE SODIUM PHOSPHATE 6 MG: 4 INJECTION, SOLUTION INTRAMUSCULAR; INTRAVENOUS at 08:11

## 2021-01-05 RX ADMIN — Medication 2000 UNITS: at 08:10

## 2021-01-05 RX ADMIN — GUAIFENESIN 1200 MG: 600 TABLET, EXTENDED RELEASE ORAL at 08:10

## 2021-01-05 RX ADMIN — BUDESONIDE AND FORMOTEROL FUMARATE DIHYDRATE 2 PUFF: 160; 4.5 AEROSOL RESPIRATORY (INHALATION) at 07:41

## 2021-01-05 RX ADMIN — OXYCODONE HYDROCHLORIDE AND ACETAMINOPHEN 500 MG: 500 TABLET ORAL at 08:11

## 2021-01-05 RX ADMIN — ALBUTEROL SULFATE 2 PUFF: 90 AEROSOL, METERED RESPIRATORY (INHALATION) at 15:48

## 2021-01-05 RX ADMIN — SODIUM CHLORIDE 50 ML/HR: 9 INJECTION, SOLUTION INTRAVENOUS at 23:06

## 2021-01-05 RX ADMIN — SODIUM CHLORIDE 50 ML/HR: 9 INJECTION, SOLUTION INTRAVENOUS at 02:54

## 2021-01-05 RX ADMIN — ALBUTEROL SULFATE 2 PUFF: 90 AEROSOL, METERED RESPIRATORY (INHALATION) at 11:50

## 2021-01-05 RX ADMIN — INSULIN ASPART 8 UNITS: 100 INJECTION, SOLUTION INTRAVENOUS; SUBCUTANEOUS at 16:45

## 2021-01-05 RX ADMIN — HEPARIN SODIUM 5000 UNITS: 5000 INJECTION, SOLUTION INTRAVENOUS; SUBCUTANEOUS at 13:18

## 2021-01-05 RX ADMIN — ATORVASTATIN CALCIUM 20 MG: 20 TABLET, FILM COATED ORAL at 08:11

## 2021-01-05 RX ADMIN — LATANOPROST 1 DROP: 50 SOLUTION OPHTHALMIC at 20:45

## 2021-01-05 NOTE — NURSING NOTE
Jeanie Chacon Spouse [phone]   notifed spouse of patient status at this time

## 2021-01-05 NOTE — PROGRESS NOTES
[facility] Medicine Services  INPATIENT PROGRESS NOTE    Length of Stay: 1  Date of Admission: 1/3/2021  Primary Care Physician: Yehuda Victor MD    Subjective   Chief Complaint: Shortness of breath    HPI: Patient states he feels some better today.  Shortness of breath continues to improve.    Review of Systems   Constitutional: Positive for activity change and fatigue. Negative for appetite change, chills and fever.   Respiratory: Positive for cough and shortness of breath. Negative for chest tightness.    Cardiovascular: Negative for chest pain, palpitations and leg swelling.   Gastrointestinal: Negative for abdominal pain, constipation, diarrhea, nausea and vomiting.   Skin: Negative for wound.   Neurological: Positive for weakness. Negative for dizziness, light-headedness, numbness and headaches.        All pertinent negatives and positives are as above. All other systems have been reviewed and are negative unless otherwise stated.     Objective    Temp:  [96 °F (35.6 °C)-98.1 °F (36.7 °C)] 96.8 °F (36 °C)  Heart Rate:  [49-72] 49  Resp:  [16-18] 18  BP: (130-148)/(60-69) 140/60    Physical Exam  Vitals signs reviewed.   Constitutional:       Appearance: He is well-developed.   HENT:      Head: Normocephalic and atraumatic.   Eyes:      Pupils: Pupils are equal, round, and reactive to light.   Neck:      Musculoskeletal: Normal range of motion and neck supple.   Cardiovascular:      Rate and Rhythm: Normal rate and regular rhythm.      Heart sounds: Normal heart sounds. No murmur. No friction rub. No gallop.    Pulmonary:      Effort: Pulmonary effort is normal. No respiratory distress.      Breath sounds: Decreased breath sounds present. No wheezing or rales.   Chest:      Chest wall: No tenderness.   Abdominal:      General: Bowel sounds are normal. There is no distension.      Palpations: Abdomen is soft.      Tenderness: There is no abdominal tenderness.      Psychiatric:         Behavior: Behavior normal.       Results Review:  I have reviewed the labs, radiology results, and diagnostic studies.    Laboratory Data:   Results from last 7 days   Lab Units 01/05/21  0743 01/04/21  0510 01/03/21  0954 01/02/21  1413   SODIUM mmol/L 135* 133* 135* 135*   POTASSIUM mmol/L 5.1 4.7 4.2 4.5   CHLORIDE mmol/L 104 102 99 97*   CO2 mmol/L 24.0 24.0 24.0 26.0   BUN mg/dL 33* 30* 25* 30*   CREATININE mg/dL 1.42* 1.57* 1.63* 2.04*   GLUCOSE mg/dL 352* 382* 183* 296*   CALCIUM mg/dL 9.2 8.9 9.0 9.3   BILIRUBIN mg/dL  --   --  0.4 0.4   ALK PHOS U/L  --   --  67 80   ALT (SGPT) U/L  --   --  16 18   AST (SGOT) U/L  --   --  21 19   ANION GAP mmol/L 7.0 7.0 12.0 12.0     Estimated Creatinine Clearance: 42.1 mL/min (A) (by C-G formula based on SCr of 1.42 mg/dL (H)).  Results from last 7 days   Lab Units 01/03/21  0954   MAGNESIUM mg/dL 1.6         Results from last 7 days   Lab Units 01/05/21  0743 01/04/21  0510 01/03/21  0954 01/02/21  1413   WBC 10*3/mm3 7.72 2.64* 7.02 4.96   HEMOGLOBIN g/dL 12.0* 11.6* 12.5* 13.4   HEMATOCRIT % 35.8* 34.8* 36.8* 40.4   PLATELETS 10*3/mm3 202 166 154 150           Culture Data:   Blood Culture   Date Value Ref Range Status   01/03/2021 No growth at 2 days  Preliminary   01/03/2021 No growth at 2 days  Preliminary   01/02/2021 No growth at 2 days  Preliminary     No results found for: URINECX  No results found for: RESPCX  No results found for: WOUNDCX  No results found for: STOOLCX  No components found for: BODYFLD    Radiology Data:   Imaging Results (Last 24 Hours)     Procedure Component Value Units Date/Time    XR Chest 1 View [[id number]] Collected: 01/05/21 0455     Updated: 01/05/21 0910    Narrative:      PROCEDURE: Portable chest x-ray    TECHNIQUE: Single frontal view of the chest    COMPARISON: 1/3/2021    HISTORY: tone U07.1 COVID-19 J12.82 Pneumonia due to Coronavirus  disease 2019    FINDINGS:  Opacities in the peripheral left mid to  lower lung and in the  right lower lung appear slightly improved compared to the prior  study. Cardiac silhouette is normal in size. Thoracic aorta  contains atherosclerotic calcification. Sternal suture wires  appear stable.      Impression:      CONCLUSION:  Opacities in the peripheral left mid to lower lung and in the  right lower lung appear slightly improved compared to the prior  study.     Electronically signed by:  Percy Jurado MD  1/5/2021 9:08 AM CST  Workstation: [id number]          I have reviewed the patient's current medications.     Assessment/Plan     Active Hospital Problems    Diagnosis   • **Pneumonia due to COVID-19 virus       Plan:    1.  COVID-19: Patient received bamlanivimab prior to admission.  Currently on Decadron.  Feeling better today.  Less short of breath.  Procalcitonin negative.  Continue bronchodilators.  2.  Diabetes mellitus: Continue Tradjenta and sliding scale insulin.  3.  Hypertension: Continue home medications.  4.  Chronic kidney disease: Renal function is improving.  5.  DVT prophylaxis: Heparin.    The patient was evaluated during the global COVID-19 pandemic, and the diagnosis was suspected/considered upon their initial presentation.  Evaluation, treatment, and testing were consistent with current guidelines for patients who present with complaints or symptoms that may be related to COVID-19.    Discharge Planning: I expect patient to be discharged to home in 1-2 days.        This document has been electronically signed by Ton Vital MD on January 5, 2021 14:36 CST

## 2021-01-05 NOTE — PLAN OF CARE
Goal Outcome Evaluation:  Plan of Care Reviewed With: patient  Progress: improving  Outcome Summary: oxygen good room air, no soa, meds given, eating and drinking adequate

## 2021-01-06 ENCOUNTER — READMISSION MANAGEMENT (OUTPATIENT)
Dept: CALL CENTER | Facility: HOSPITAL | Age: 81
End: 2021-01-06

## 2021-01-06 VITALS
DIASTOLIC BLOOD PRESSURE: 57 MMHG | WEIGHT: 155.5 LBS | BODY MASS INDEX: 23.03 KG/M2 | SYSTOLIC BLOOD PRESSURE: 129 MMHG | HEART RATE: 53 BPM | HEIGHT: 69 IN | OXYGEN SATURATION: 95 % | RESPIRATION RATE: 18 BRPM | TEMPERATURE: 97.7 F

## 2021-01-06 LAB
ANION GAP SERPL CALCULATED.3IONS-SCNC: 8 MMOL/L (ref 5–15)
BASOPHILS # BLD AUTO: 0 10*3/MM3 (ref 0–0.2)
BASOPHILS NFR BLD AUTO: 0 % (ref 0–1.5)
BUN SERPL-MCNC: 33 MG/DL (ref 8–23)
BUN/CREAT SERPL: 22.4 (ref 7–25)
CALCIUM SPEC-SCNC: 9.1 MG/DL (ref 8.6–10.5)
CHLORIDE SERPL-SCNC: 103 MMOL/L (ref 98–107)
CO2 SERPL-SCNC: 23 MMOL/L (ref 22–29)
CREAT SERPL-MCNC: 1.47 MG/DL (ref 0.76–1.27)
DEPRECATED RDW RBC AUTO: 40.2 FL (ref 37–54)
EOSINOPHIL # BLD AUTO: 0 10*3/MM3 (ref 0–0.4)
EOSINOPHIL NFR BLD AUTO: 0 % (ref 0.3–6.2)
ERYTHROCYTE [DISTWIDTH] IN BLOOD BY AUTOMATED COUNT: 12.2 % (ref 12.3–15.4)
GFR SERPL CREATININE-BSD FRML MDRD: 46 ML/MIN/1.73
GLUCOSE BLDC GLUCOMTR-MCNC: 271 MG/DL (ref 70–130)
GLUCOSE BLDC GLUCOMTR-MCNC: 316 MG/DL (ref 70–130)
GLUCOSE SERPL-MCNC: 269 MG/DL (ref 65–99)
HCT VFR BLD AUTO: 33.6 % (ref 37.5–51)
HGB BLD-MCNC: 11.5 G/DL (ref 13–17.7)
IMM GRANULOCYTES # BLD AUTO: 0.07 10*3/MM3 (ref 0–0.05)
IMM GRANULOCYTES NFR BLD AUTO: 0.8 % (ref 0–0.5)
LYMPHOCYTES # BLD AUTO: 1.14 10*3/MM3 (ref 0.7–3.1)
LYMPHOCYTES NFR BLD AUTO: 13.5 % (ref 19.6–45.3)
MCH RBC QN AUTO: 30.9 PG (ref 26.6–33)
MCHC RBC AUTO-ENTMCNC: 34.2 G/DL (ref 31.5–35.7)
MCV RBC AUTO: 90.3 FL (ref 79–97)
MONOCYTES # BLD AUTO: 0.63 10*3/MM3 (ref 0.1–0.9)
MONOCYTES NFR BLD AUTO: 7.5 % (ref 5–12)
NEUTROPHILS NFR BLD AUTO: 6.58 10*3/MM3 (ref 1.7–7)
NEUTROPHILS NFR BLD AUTO: 78.2 % (ref 42.7–76)
NRBC BLD AUTO-RTO: 0 /100 WBC (ref 0–0.2)
PLATELET # BLD AUTO: 210 10*3/MM3 (ref 140–450)
PMV BLD AUTO: 11.7 FL (ref 6–12)
POTASSIUM SERPL-SCNC: 4.3 MMOL/L (ref 3.5–5.2)
RBC # BLD AUTO: 3.72 10*6/MM3 (ref 4.14–5.8)
SODIUM SERPL-SCNC: 134 MMOL/L (ref 136–145)
WBC # BLD AUTO: 8.42 10*3/MM3 (ref 3.4–10.8)

## 2021-01-06 PROCEDURE — 96372 THER/PROPH/DIAG INJ SC/IM: CPT

## 2021-01-06 PROCEDURE — 85025 COMPLETE CBC W/AUTO DIFF WBC: CPT | Performed by: INTERNAL MEDICINE

## 2021-01-06 PROCEDURE — 96376 TX/PRO/DX INJ SAME DRUG ADON: CPT

## 2021-01-06 PROCEDURE — 94799 UNLISTED PULMONARY SVC/PX: CPT

## 2021-01-06 PROCEDURE — 63710000001 INSULIN ASPART PER 5 UNITS: Performed by: INTERNAL MEDICINE

## 2021-01-06 PROCEDURE — G0378 HOSPITAL OBSERVATION PER HR: HCPCS

## 2021-01-06 PROCEDURE — 25010000002 HEPARIN (PORCINE) PER 1000 UNITS: Performed by: INTERNAL MEDICINE

## 2021-01-06 PROCEDURE — 80048 BASIC METABOLIC PNL TOTAL CA: CPT | Performed by: INTERNAL MEDICINE

## 2021-01-06 PROCEDURE — 94760 N-INVAS EAR/PLS OXIMETRY 1: CPT

## 2021-01-06 PROCEDURE — 96361 HYDRATE IV INFUSION ADD-ON: CPT

## 2021-01-06 PROCEDURE — 25010000002 DEXAMETHASONE PER 1 MG: Performed by: INTERNAL MEDICINE

## 2021-01-06 PROCEDURE — 82962 GLUCOSE BLOOD TEST: CPT

## 2021-01-06 RX ORDER — BUDESONIDE AND FORMOTEROL FUMARATE DIHYDRATE 160; 4.5 UG/1; UG/1
2 AEROSOL RESPIRATORY (INHALATION)
Qty: 10.2 G | Refills: 12 | Status: SHIPPED | OUTPATIENT
Start: 2021-01-06 | End: 2021-10-12

## 2021-01-06 RX ORDER — DEXAMETHASONE 2 MG/1
6 TABLET ORAL
Qty: 9 TABLET | Refills: 0 | Status: SHIPPED | OUTPATIENT
Start: 2021-01-06 | End: 2021-10-12

## 2021-01-06 RX ADMIN — Medication 2000 UNITS: at 08:19

## 2021-01-06 RX ADMIN — GUAIFENESIN 1200 MG: 600 TABLET, EXTENDED RELEASE ORAL at 08:18

## 2021-01-06 RX ADMIN — BUDESONIDE AND FORMOTEROL FUMARATE DIHYDRATE 2 PUFF: 160; 4.5 AEROSOL RESPIRATORY (INHALATION) at 07:24

## 2021-01-06 RX ADMIN — CLOPIDOGREL BISULFATE 75 MG: 75 TABLET ORAL at 08:19

## 2021-01-06 RX ADMIN — HEPARIN SODIUM 5000 UNITS: 5000 INJECTION, SOLUTION INTRAVENOUS; SUBCUTANEOUS at 05:59

## 2021-01-06 RX ADMIN — FOLIC ACID 1 MG: 1 TABLET ORAL at 08:19

## 2021-01-06 RX ADMIN — ASPIRIN 81 MG: 81 TABLET, COATED ORAL at 08:19

## 2021-01-06 RX ADMIN — ALBUTEROL SULFATE 2 PUFF: 90 AEROSOL, METERED RESPIRATORY (INHALATION) at 07:25

## 2021-01-06 RX ADMIN — ALBUTEROL SULFATE 2 PUFF: 90 AEROSOL, METERED RESPIRATORY (INHALATION) at 10:40

## 2021-01-06 RX ADMIN — DEXAMETHASONE SODIUM PHOSPHATE 6 MG: 4 INJECTION, SOLUTION INTRAMUSCULAR; INTRAVENOUS at 08:19

## 2021-01-06 RX ADMIN — OXYCODONE HYDROCHLORIDE AND ACETAMINOPHEN 500 MG: 500 TABLET ORAL at 08:19

## 2021-01-06 RX ADMIN — ATORVASTATIN CALCIUM 20 MG: 20 TABLET, FILM COATED ORAL at 08:19

## 2021-01-06 RX ADMIN — LINAGLIPTIN 5 MG: 5 TABLET, FILM COATED ORAL at 08:19

## 2021-01-06 RX ADMIN — FLUOXETINE 40 MG: 20 CAPSULE ORAL at 08:19

## 2021-01-06 RX ADMIN — TIMOLOL MALEATE 1 DROP: 5 SOLUTION OPHTHALMIC at 08:20

## 2021-01-06 RX ADMIN — INSULIN ASPART 8 UNITS: 100 INJECTION, SOLUTION INTRAVENOUS; SUBCUTANEOUS at 08:19

## 2021-01-06 RX ADMIN — SODIUM CHLORIDE, PRESERVATIVE FREE 10 ML: 5 INJECTION INTRAVENOUS at 08:20

## 2021-01-06 RX ADMIN — ZINC SULFATE 220 MG (50 MG) CAPSULE 220 MG: CAPSULE at 08:19

## 2021-01-06 NOTE — PLAN OF CARE
"Goal Outcome Evaluation:  Plan of Care Reviewed With: patient  Progress: improving  Outcome Summary: room air continued, no new compliants, pt states \" hes ready to go home\", resting comfortably between care,  "

## 2021-01-06 NOTE — DISCHARGE SUMMARY
[facility] Medicine Services  DISCHARGE SUMMARY       Date of Admission: 1/3/2021  Date of Discharge:  1/6/2021  Primary Care Physician: Yehuda Victor MD    Presenting Problem/History of Present Illness:  Pneumonia due to COVID-19 virus [U07.1, J12.82]  Pneumonia due to COVID-19 virus [U07.1, J12.82]       Final Discharge Diagnoses:  Active Hospital Problems    Diagnosis   • **Pneumonia due to COVID-19 virus       Consults:   Consults     Date and Time Order Name Status Description    1/3/2021 1106 Hospitalist (on-call MD unless specified)            Pertinent Test Results:   Lab Results (most recent)     Procedure Component Value Units Date/Time    POC Glucose Once [[id number]]  (Abnormal) Collected: 01/06/21 0730    Specimen: Blood Updated: 01/06/21 0802     Glucose 271 mg/dL      Comment: RN NotifiedOperator: [id number] NELLI LEXIEMeter ID: [id number]       Basic Metabolic Panel [[id number]]  (Abnormal) Collected: 01/06/21 0632    Specimen: Blood Updated: 01/06/21 0729     Glucose 269 mg/dL      BUN 33 mg/dL      Creatinine 1.47 mg/dL      Sodium 134 mmol/L      Potassium 4.3 mmol/L      Chloride 103 mmol/L      CO2 23.0 mmol/L      Calcium 9.1 mg/dL      eGFR Non African Amer 46 mL/min/1.73      BUN/Creatinine Ratio 22.4     Anion Gap 8.0 mmol/L     Narrative:      GFR Normal >60  Chronic Kidney Disease <60  Kidney Failure <15      CBC & Differential [[id number]]  (Abnormal) Collected: 01/06/21 0632    Specimen: Blood Updated: 01/06/21 0714    Narrative:      The following orders were created for panel order CBC & Differential.  Procedure                               Abnormality         Status                     ---------                               -----------         ------                     CBC Auto Differential[[id number]]        Abnormal            Final result                 Please view results for these tests on the individual orders.    CBC Auto  Differential [[id number]]  (Abnormal) Collected: 01/06/21 0632    Specimen: Blood Updated: 01/06/21 0714     WBC 8.42 10*3/mm3      RBC 3.72 10*6/mm3      Hemoglobin 11.5 g/dL      Hematocrit 33.6 %      MCV 90.3 fL      MCH 30.9 pg      MCHC 34.2 g/dL      RDW 12.2 %      RDW-SD 40.2 fl      MPV 11.7 fL      Platelets 210 10*3/mm3      Neutrophil % 78.2 %      Lymphocyte % 13.5 %      Monocyte % 7.5 %      Eosinophil % 0.0 %      Basophil % 0.0 %      Immature Grans % 0.8 %      Neutrophils, Absolute 6.58 10*3/mm3      Lymphocytes, Absolute 1.14 10*3/mm3      Monocytes, Absolute 0.63 10*3/mm3      Eosinophils, Absolute 0.00 10*3/mm3      Basophils, Absolute 0.00 10*3/mm3      Immature Grans, Absolute 0.07 10*3/mm3      nRBC 0.0 /100 WBC     POC Glucose Once [[id number]]  (Abnormal) Collected: 01/05/21 2304    Specimen: Blood Updated: 01/06/21 0534     Glucose 316 mg/dL      Comment: RN NotifiedOperator: [id number] JAVIER ASHEncompass [facility] Hospital of ScottsdaleMeter ID: [id number]       Blood Culture - Blood, Arm, Left [[id number]] Collected: 01/03/21 1213    Specimen: Blood from Arm, Left Updated: 01/05/21 1230     Blood Culture No growth at 2 days    Blood Culture - Blood, Hand, Left [[id number]] Collected: 01/03/21 1135    Specimen: Blood from Hand, Left Updated: 01/05/21 1145     Blood Culture No growth at 2 days    CBC & Differential [[id number]]  (Abnormal) Collected: 01/05/21 0743    Specimen: Blood Updated: 01/05/21 0803    Narrative:      The following orders were created for panel order CBC & Differential.  Procedure                               Abnormality         Status                     ---------                               -----------         ------                     CBC Auto Differential[[id number]]        Abnormal            Final result                 Please view results for these tests on the individual orders.    CBC Auto Differential [[id number]]  (Abnormal) Collected: 01/05/21 0743    Specimen: Blood Updated: 01/05/21 0803        WBC 7.72 10*3/mm3      RBC 3.86 10*6/mm3      Hemoglobin 12.0 g/dL      Hematocrit 35.8 %      MCV 92.7 fL      MCH 31.1 pg      MCHC 33.5 g/dL      RDW 12.2 %      RDW-SD 41.5 fl      MPV 11.5 fL      Platelets 202 10*3/mm3      Neutrophil % 80.1 %      Lymphocyte % 12.7 %      Monocyte % 6.7 %      Eosinophil % 0.0 %      Basophil % 0.0 %      Immature Grans % 0.5 %      Neutrophils, Absolute 6.18 10*3/mm3      Lymphocytes, Absolute 0.98 10*3/mm3      Monocytes, Absolute 0.52 10*3/mm3      Eosinophils, Absolute 0.00 10*3/mm3      Basophils, Absolute 0.00 10*3/mm3      Immature Grans, Absolute 0.04 10*3/mm3      nRBC 0.0 /100 WBC     Basic Metabolic Panel [[id number]]  (Abnormal) Collected: 01/05/21 0743    Specimen: Blood Updated: 01/05/21 0802     Glucose 352 mg/dL      BUN 33 mg/dL      Creatinine 1.42 mg/dL      Sodium 135 mmol/L      Potassium 5.1 mmol/L      Chloride 104 mmol/L      CO2 24.0 mmol/L      Calcium 9.2 mg/dL      eGFR Non African Amer 48 mL/min/1.73      BUN/Creatinine Ratio 23.2     Anion Gap 7.0 mmol/L     Narrative:      GFR Normal >60  Chronic Kidney Disease <60  Kidney Failure <15      D-dimer, Quantitative [[id number]]  (Abnormal) Collected: 01/03/21 0954    Specimen: Blood Updated: 01/03/21 1529     D-Dimer, Quantitative 853 ng/mL (FEU)     Narrative:      Dimer values <500 ng/ml FEU are FDA approved as aid in diagnosis of deep venous thrombosis and pulmonary embolism.  This test should not be used in an exclusion strategy with pretest probability alone.    A recent guideline regarding diagnosis for pulmonary thromboembolism recommends an adjusted exclusion criterion of age x 10 ng/ml FEU for patients >50 years of age ([employer] Intern Med 2015; 163: 701-711).      Ferritin [[id number]]  (Abnormal) Collected: 01/03/21 0954    Specimen: Blood Updated: 01/03/21 1436     Ferritin 529.40 ng/mL     Narrative:      Results may be falsely decreased if patient taking Biotin.      Procalcitonin  "[[id number]]  (Normal) Collected: 01/03/21 0954    Specimen: Blood Updated: 01/03/21 1434     Procalcitonin 0.24 ng/mL     Narrative:      As a Marker for Sepsis (Non-Neonates):   1. <0.5 ng/mL represents a low risk of severe sepsis and/or septic shock.  1. >2 ng/mL represents a high risk of severe sepsis and/or septic shock.    As a Marker for Lower Respiratory Tract Infections that require antibiotic therapy:  PCT on Admission     Antibiotic Therapy             6-12 Hrs later  > 0.5                Strongly Recommended            >0.25 - <0.5         Recommended  0.1 - 0.25           Discouraged                   Remeasure/reassess PCT  <0.1                 Strongly Discouraged          Remeasure/reassess PCT      As 28 day mortality risk marker: \"Change in Procalcitonin Result\" (> 80 % or <=80 %) if Day 0 (or Day 1) and Day 4 values are available. Refer to http://www.[facility]ig[facility] KidsINTEGR[facility]idm-spatialpct-calculator.com/   Change in PCT <=80 %   A decrease of PCT levels below or equal to 80 % defines a positive change in PCT test result representing a higher risk for 28-day all-cause mortality of patients diagnosed with severe sepsis or septic shock.  Change in PCT > 80 %   A decrease of PCT levels of more than 80 % defines a negative change in PCT result representing a lower risk for 28-day all-cause mortality of patients diagnosed with severe sepsis or septic shock.                Results may be falsely decreased if patient taking Biotin.     C-reactive Protein [[id number]]  (Abnormal) Collected: 01/03/21 0954    Specimen: Blood Updated: 01/03/21 1428     C-Reactive Protein 8.54 mg/dL     Lactate Dehydrogenase [[id number]]  (Abnormal) Collected: 01/03/21 0954    Specimen: Blood Updated: 01/03/21 1428      U/L     Magnesium [[id number]]  (Normal) Collected: 01/03/21 0954    Specimen: Blood Updated: 01/03/21 1428     Magnesium 1.6 mg/dL     Hemoglobin A1c [[id number]]  (Abnormal) Collected: 01/03/21 0954    Specimen: Blood Updated: " 01/03/21 1428     Hemoglobin A1C 8.20 %     Narrative:      Hemoglobin A1C Ranges:    Increased Risk for Diabetes  5.7% to 6.4%  Diabetes                     >= 6.5%  Diabetic Goal                < 7.0%    Lactic Acid, Plasma [[id number]]  (Normal) Collected: 01/03/21 1135    Specimen: Blood Updated: 01/03/21 1158     Lactate 1.0 mmol/L     Extra Tubes [[id number]] Collected: 01/03/21 0954    Specimen: Blood Updated: 01/03/21 1101    Narrative:      The following orders were created for panel order Extra Tubes.  Procedure                               Abnormality         Status                     ---------                               -----------         ------                     Light Blue Top[[id number]]                                   Final result               Gold Top - SST[[id number]]                                   Final result                 Please view results for these tests on the individual orders.    Light Blue Top [[id number]] Collected: 01/03/21 0954    Specimen: Blood Updated: 01/03/21 1101     Extra Tube hold for add-on     Comment: Auto resulted       Gold Top - SST [[id number]] Collected: 01/03/21 0954    Specimen: Blood Updated: 01/03/21 1101     Extra Tube Hold for add-ons.     Comment: Auto resulted.       Comprehensive Metabolic Panel [[id number]]  (Abnormal) Collected: 01/03/21 0954    Specimen: Blood Updated: 01/03/21 1030     Glucose 183 mg/dL      BUN 25 mg/dL      Creatinine 1.63 mg/dL      Sodium 135 mmol/L      Potassium 4.2 mmol/L      Chloride 99 mmol/L      CO2 24.0 mmol/L      Calcium 9.0 mg/dL      Total Protein 6.7 g/dL      Albumin 3.40 g/dL      ALT (SGPT) 16 U/L      AST (SGOT) 21 U/L      Alkaline Phosphatase 67 U/L      Total Bilirubin 0.4 mg/dL      eGFR Non African Amer 41 mL/min/1.73      Globulin 3.3 gm/dL      A/G Ratio 1.0 g/dL      BUN/Creatinine Ratio 15.3     Anion Gap 12.0 mmol/L     Narrative:      GFR Normal >60  Chronic Kidney Disease <60  Kidney Failure <15             Imaging Results (Most Recent)     Procedure Component Value Units Date/Time    XR Chest 1 View [[id number]] Collected: 01/05/21 0455     Updated: 01/05/21 0910    Narrative:      PROCEDURE: Portable chest x-ray    TECHNIQUE: Single frontal view of the chest    COMPARISON: 1/3/2021    HISTORY: TRI wayne7.1 COVID-19 J12.82 Pneumonia due to Coronavirus  disease 2019    FINDINGS:  Opacities in the peripheral left mid to lower lung and in the  right lower lung appear slightly improved compared to the prior  study. Cardiac silhouette is normal in size. Thoracic aorta  contains atherosclerotic calcification. Sternal suture wires  appear stable.      Impression:      CONCLUSION:  Opacities in the peripheral left mid to lower lung and in the  right lower lung appear slightly improved compared to the prior  study.     Electronically signed by:  Percy Jurado MD  1/5/2021 9:08 AM CST  Workstation: [id number]    XR Chest 1 View [[id number]] Collected: 01/03/21 0939     Updated: 01/03/21 1000    Narrative:      Chest x-ray single view.       CLINICAL INDICATION: Shortness of breath. COVID Positive    COMPARISON: Chest January 2, 2021    FINDINGS: Cardiac silhouette is enlarged in size. Pulmonary  vascularity is unremarkable.   There are midline sternal sutures from prior cardiac surgery.    There are bilateral infiltrative changes more pronounced on the  left than on the right. No significant changes since prior exam.      Impression:      Bilateral infiltrative changes more so on the left  than on the right, bilateral pneumonitis. No change since prior  exam.    Electronically signed by:  Bubba Hampton MD  1/3/2021 9:59 AM CST  Workstation: [id number]          Chief Complaint on Day of Discharge: None    Hospital Course:  The patient is a 80 y.o. male who presented to [facility] with shortness of breath.  Patient was previously diagnosed with COVID-19 and treated with bamlanivimab.  He worsened and represented to  "the emergency department and was admitted.  He was started on Decadron.  He did well over the course of his hospital stay and was weaned off supplemental oxygen.  He was feeling good and was discharged home in good condition.  He will finish his Decadron course at home.    Condition on Discharge: Stable    Physical Exam on Discharge:  /57 (BP Location: Left arm, Patient Position: Sitting)   Pulse 63   Temp 97.7 °F (36.5 °C) (Oral)   Resp 18   Ht 175.3 cm (69.02\")   Wt 70.5 kg (155 lb 8 oz)   SpO2 95%   BMI 22.95 kg/m²      Physical Exam  Vitals signs reviewed.   Constitutional:       Appearance: He is well-developed.   HENT:      Head: Normocephalic and atraumatic.   Eyes:      Pupils: Pupils are equal, round, and reactive to light.   Neck:      Musculoskeletal: Normal range of motion and neck supple.   Cardiovascular:      Rate and Rhythm: Normal rate and regular rhythm.      Heart sounds: Normal heart sounds. No murmur. No friction rub. No gallop.    Pulmonary:      Effort: Pulmonary effort is normal. No respiratory distress.      Breath sounds: Normal breath sounds. No wheezing or rales.   Chest:      Chest wall: No tenderness.   Abdominal:      General: Bowel sounds are normal. There is no distension.      Palpations: Abdomen is soft.      Tenderness: There is no abdominal tenderness.   Psychiatric:         Behavior: Behavior normal.       Discharge Disposition:  Home or Self Care    Discharge Medications:     Discharge Medications      New Medications      Instructions Start Date   budesonide-formoterol 160-4.5 MCG/ACT inhaler  Commonly known as: SYMBICORT   2 puffs, Inhalation, 2 Times Daily - RT      dexamethasone 6 MG tablet  Commonly known as: DECADRON   6 mg, Oral, Daily With Breakfast         Continue These Medications      Instructions Start Date   albuterol sulfate  (90 Base) MCG/ACT inhaler  Commonly known as: PROVENTIL HFA;VENTOLIN HFA;PROAIR HFA   2 puffs, Inhalation, Every 4 " Hours PRN      aspirin 81 MG tablet   81 mg, Oral, Daily      atorvastatin 20 MG tablet  Commonly known as: LIPITOR   20 mg, Oral, Daily      chlorhexidine 0.12 % solution  Commonly known as: PERIDEX   15 mL, Mouth/Throat, 2 Times Daily      Cinnamon 500 MG tablet   1,000 mg, Oral, 2 Times Daily      clopidogrel 75 MG tablet  Commonly known as: PLAVIX   75 mg, Oral, Daily      fish oil 1000 MG capsule capsule   Oral, Daily With Breakfast      FLUoxetine 40 MG capsule  Commonly known as: PROzac   40 mg, Oral, Daily      folic acid 1 MG tablet  Commonly known as: FOLVITE   1 mg, Oral, Daily      glimepiride 4 MG tablet  Commonly known as: AMARYL   4 mg, Oral, Every Morning Before Breakfast      irbesartan 75 MG tablet  Commonly known as: AVAPRO   75 mg, Oral, Nightly      latanoprost 0.005 % ophthalmic solution  Commonly known as: XALATAN   ADMINISTER 1 DROP TO BOTH EYES EVERY NIGHT.      ondansetron ODT 4 MG disintegrating tablet  Commonly known as: ZOFRAN-ODT   4 mg, Translingual, Every 8 Hours PRN      promethazine-dextromethorphan 6.25-15 MG/5ML syrup  Commonly known as: PROMETHAZINE-DM   5 mL, Oral, 4 Times Daily PRN      SITagliptin 100 MG tablet  Commonly known as: JANUVIA   100 mg, Oral, Daily      timolol 0.5 % ophthalmic solution  Commonly known as: TIMOPTIC   1 drop, Both Eyes, Take As Directed      vitamin C 250 MG tablet  Commonly known as: ASCORBIC ACID   500 mg, Oral, Daily      Vitamin D3 10 MCG (400 UNIT) capsule   Oral, Daily      Zinc 50 MG capsule   50 mg, Oral, Daily             Discharge Diet:   Diet Instructions     Advance Diet As Tolerated            Activity at Discharge:   Activity Instructions     Activity as Tolerated            Follow-up Appointments:   Future Appointments   Date Time Provider Department Center   1/19/2021  2:00 PM [facility]   1/19/2021  2:30 PM Hamman, Jack L, MD [facility] CTV [facility] None   2/10/2021 11:15 AM Janeen Blevins MD [facility] CD [facility]  None       Test Results Pending at Discharge:   Pending Labs     Order Current Status    Blood Culture - Blood, Arm, Left Preliminary result    Blood Culture - Blood, Hand, Left Preliminary result              This document has been electronically signed by Ton Vital MD on January 6, 2021 10:30 CST      Time:  35 min

## 2021-01-07 ENCOUNTER — READMISSION MANAGEMENT (OUTPATIENT)
Dept: CALL CENTER | Facility: HOSPITAL | Age: 81
End: 2021-01-07

## 2021-01-07 LAB — BACTERIA SPEC AEROBE CULT: NORMAL

## 2021-01-07 NOTE — OUTREACH NOTE
COVID-19 Week 1 Survey      Responses   [facility] patient discharged from?  [place]   Does the patient have one of the following disease processes/diagnoses(primary or secondary)?  COVID-19   COVID-19 underlying condition?  None   Call Number  Call 1   Week 1 Call successful?  No   Discharge diagnosis  Pneumonia due to COVID-19 virus          Samantha Arenas LPN

## 2021-01-07 NOTE — OUTREACH NOTE
Prep Survey      Responses   [facility] facility patient discharged from?  [place]   Is LACE score < 7 ?  No   Emergency Room discharge w/ pulse ox?  No   Eligibility  Readm Mgmt   Discharge diagnosis  Pneumonia due to COVID-19 virus   Does the patient have one of the following disease processes/diagnoses(primary or secondary)?  COVID-19   Does the patient have Home health ordered?  No   Is there a DME ordered?  No   Prep survey completed?  Yes          Mira Law RN

## 2021-01-08 ENCOUNTER — READMISSION MANAGEMENT (OUTPATIENT)
Dept: CALL CENTER | Facility: HOSPITAL | Age: 81
End: 2021-01-08

## 2021-01-08 LAB
BACTERIA SPEC AEROBE CULT: NORMAL
BACTERIA SPEC AEROBE CULT: NORMAL

## 2021-01-08 NOTE — OUTREACH NOTE
COVID-19 Week 1 Survey      Responses   [facility] patient discharged from?  [place]   Does the patient have one of the following disease processes/diagnoses(primary or secondary)?  COVID-19   COVID-19 underlying condition?  None   Call Number  Call 2   Week 1 Call successful?  No   Discharge diagnosis  Pneumonia due to COVID-19 virus          Faye Cruz RN

## 2021-01-09 ENCOUNTER — READMISSION MANAGEMENT (OUTPATIENT)
Dept: CALL CENTER | Facility: HOSPITAL | Age: 81
End: 2021-01-09

## 2021-01-09 NOTE — OUTREACH NOTE
COVID-19 Week 1 Survey      Responses   [facility] patient discharged from?  [place]   Does the patient have one of the following disease processes/diagnoses(primary or secondary)?  COVID-19   COVID-19 underlying condition?  None   Call Number  Call 3   Week 1 Call successful?  Yes   Call start time  1216   Call end time  1223   Discharge diagnosis  Pneumonia due to COVID-19 virus   Is the patient having any side effects they believe may be caused by any medication additions or changes?  No   Does the patient have all medications ordered at discharge?  Yes   Is the patient taking all medications as directed (includes completed medication regime)?  Yes   Comments regarding appointments  Appt. with PCP 1/13/21   Does the patient have a primary care provider?   Yes   Does the patient have an appointment with their PCP or specialist within 7 days of discharge?  Yes   Did the patient receive a copy of their discharge instructions?  Yes   Did the patient receive a copy of COVID-19 specific instructions?  Yes   Nursing interventions  Reviewed instructions with patient   What is the patient's perception of their health status since discharge?  Improving   Does the patient have any of the following symptoms?  Cough   Nursing Interventions  Nurse provided patient education   Pulse Ox monitoring  Intermittent   O2 Sat comments  97% on RA   O2 Sat: education provided  Sat levels, When to seek care   O2 Sat education comments  Told pt. if unable to maintain O2 sat 92% or greater to return to ED.   Is the patient/caregiver able to teach back steps to recovery at home?  Eat a well-balance diet, Rest and rebuild strength, gradually increase activity   Is the patient/caregiver able to teach back the hierarchy of who to call/visit for symptoms/problems? PCP, Specialist, Home health nurse, Urgent Care, ED, 911  Yes   COVID-19 call completed?  Yes   Wrap up additional comments  Pt. reports he has spoke with PCP a few times  regarding elevated blood sugar. Pt. states he is to continue steroid as prescribed. Told pt. if blood sugar continues to increase and symptomatic to return to ED. Pt. reports he feels better but still tired.           Leticia Ko RN

## 2021-01-12 ENCOUNTER — READMISSION MANAGEMENT (OUTPATIENT)
Dept: CALL CENTER | Facility: HOSPITAL | Age: 81
End: 2021-01-12

## 2021-01-12 NOTE — OUTREACH NOTE
COVID-19 Week 1 Survey      Responses   [facility] patient discharged from?  [place]   Does the patient have one of the following disease processes/diagnoses(primary or secondary)?  COVID-19   COVID-19 underlying condition?  None   Call Number  Call 4   Week 1 Call successful?  Yes   Call start time  0934   Call end time  0936   Discharge diagnosis  Pneumonia due to COVID-19 virus   Meds reviewed with patient/caregiver?  Yes   Is the patient taking all medications as directed (includes completed medication regime)?  Yes   Has the patient kept scheduled appointments due by today?  N/A   What is the patient's perception of their health status since discharge?  Improving   Does the patient have any of the following symptoms?  Cough, Loss of taste/smell [Pt has no smell baseline]   Nursing Interventions  Nurse provided patient education   Pulse Ox monitoring  Intermittent   Pulse Ox device source  Patient   O2 Sat comments  Not checked today however it was 97% on RA    Is the patient/caregiver able to teach back steps to recovery at home?  Rest and rebuild strength, gradually increase activity, Eat a well-balance diet   COVID-19 call completed?  Yes          Mary Kay Mendoza RN

## 2021-01-15 ENCOUNTER — READMISSION MANAGEMENT (OUTPATIENT)
Dept: CALL CENTER | Facility: HOSPITAL | Age: 81
End: 2021-01-15

## 2021-01-15 NOTE — OUTREACH NOTE
COVID-19 Week 2 Survey      Responses   [facility] patient discharged from?  [place]   Does the patient have one of the following disease processes/diagnoses(primary or secondary)?  COVID-19   COVID-19 underlying condition?  None   Call Number  Call 1   COVID-19 Week 2: Call 1 attempt successful?  Yes   Call start time  1614   Call end time  1618   Discharge diagnosis  Pneumonia due to COVID-19 virus   Is patient permission given to speak with other caregiver?  Yes   List who call center can speak with  wife   Is the patient taking all medications as directed (includes completed medication regime)?  Yes   Has the patient kept scheduled appointments due by today?  Yes   Psychosocial issues?  No   Comments  Pt reports his baseline is without taste. Appetite is WNL. Monitoring glucose at home, 190   What is the patient's perception of their health status since discharge?  Improving   Does the patient have any of the following symptoms?  Cough [+fatigue]   Nursing Interventions  Nurse provided patient education   Pulse Ox monitoring  Intermittent   Pulse Ox device source  Patient   O2 Sat comments  97%RA   O2 Sat: education provided  Sat levels   Is the patient/caregiver able to teach back steps to recovery at home?  Rest and rebuild strength, gradually increase activity   Is the patient/caregiver able to teach back the hierarchy of who to call/visit for symptoms/problems? PCP, Specialist, Home health nurse, Urgent Care, ED, 911  Yes   COVID-19 call completed?  Yes          Faye Cruz RN

## 2021-01-20 ENCOUNTER — READMISSION MANAGEMENT (OUTPATIENT)
Dept: CALL CENTER | Facility: HOSPITAL | Age: 81
End: 2021-01-20

## 2021-01-20 NOTE — OUTREACH NOTE
COVID-19 Week 3 Survey      Responses   [facility] patient discharged from?  [place]   Does the patient have one of the following disease processes/diagnoses(primary or secondary)?  COVID-19   COVID-19 underlying condition?  None   Call Number  Call 1   COVID-19 Week 3: Call 1 attempt successful?  Yes   Call start time  1227   Call end time  1230   Discharge diagnosis  Pneumonia due to COVID-19 virus   Meds reviewed with patient/caregiver?  Yes   Is the patient having any side effects they believe may be caused by any medication additions or changes?  No   Does the patient have all medications ordered at discharge?  Yes   Is the patient taking all medications as directed (includes completed medication regime)?  Yes   Does the patient have a primary care provider?   Yes   Does the patient have an appointment with their PCP or specialist within 7 days of discharge?  Yes   Has the patient kept scheduled appointments due by today?  Yes   Has home health visited the patient within 72 hours of discharge?  N/A   Psychosocial issues?  No   Did the patient receive a copy of their discharge instructions?  Yes   Did the patient receive a copy of COVID-19 specific instructions?  Yes   Nursing interventions  Reviewed instructions with patient   What is the patient's perception of their health status since discharge?  Improving   Does the patient have any of the following symptoms?  Cough   Nursing Interventions  Nurse provided patient education   Pulse Ox monitoring  Intermittent   Pulse Ox device source  Patient   O2 Sat comments  97%RA   O2 Sat: education provided  Sat levels, Monitoring frequency, When to seek care   Is the patient/caregiver able to teach back steps to recovery at home?  Rest and rebuild strength, gradually increase activity   If the patient is a current smoker, are they able to teach back resources for cessation?  Not a smoker   Is the patient/caregiver able to teach back the hierarchy of who to  call/visit for symptoms/problems? PCP, Specialist, Home health nurse, Urgent Care, ED, 911  Yes   COVID-19 call completed?  Yes   Wrap up additional comments  Blood sugars are under 200. He is feeling better. getting strength back.           Demond Wong RN

## 2021-01-27 ENCOUNTER — READMISSION MANAGEMENT (OUTPATIENT)
Dept: CALL CENTER | Facility: HOSPITAL | Age: 81
End: 2021-01-27

## 2021-01-27 NOTE — OUTREACH NOTE
COVID-19 Week 4 Survey      Responses   [facility] patient discharged from?  [place]   Does the patient have one of the following disease processes/diagnoses(primary or secondary)?  COVID-19   COVID-19 underlying condition?  None   Call Number  Call 1   COVID-19 Week 4: Call 1 attempt successful?  Yes   Call start time  1102   Call end time  1110   Meds reviewed with patient/caregiver?  Yes   Is the patient having any side effects they believe may be caused by any medication additions or changes?  No   Does the patient have all medications ordered at discharge?  Yes   Is the patient taking all medications as directed (includes completed medication regime)?  Yes   Has the patient kept scheduled appointments due by today?  No   Nursing Interventions  Advised to reschedule appointment, Educated on importance of keeping appointment   Comments  States will see cardiovascular dr in one year.   Is the patient still receiving Home Health Services?  N/A   What is the patient's perception of their health status since discharge?  Improving   Does the patient have any of the following symptoms?  None   Nursing Interventions  Nurse provided patient education   Pulse Ox monitoring  Intermittent   Pulse Ox device source  Patient   O2 Sat comments  States has not checked lately due to feeling much better.   Is the patient/caregiver able to teach back the hierarchy of who to call/visit for symptoms/problems? PCP, Specialist, Home health nurse, Urgent Care, ED, 911  Yes   Is the patient interested in additional calls from an ambulatory ?  NOTE:  applies to high risk patients requiring additional follow-up.  No   Did the patient feel the follow up calls were helpful during their recovery period?  Yes   Was the number of calls appropriate?  Yes   Interested in COVID-19 Plasma Donation?  Uncertain   Wrap up additional comments  States is feeling better-denies any needs today. States  yesterday.            Kamla Farley, RN

## 2021-03-15 ENCOUNTER — BULK ORDERING (OUTPATIENT)
Dept: CASE MANAGEMENT | Facility: OTHER | Age: 81
End: 2021-03-15

## 2021-03-15 DIAGNOSIS — Z23 IMMUNIZATION DUE: ICD-10-CM

## 2021-10-12 ENCOUNTER — OFFICE VISIT (OUTPATIENT)
Dept: CARDIAC SURGERY | Facility: CLINIC | Age: 81
End: 2021-10-12

## 2021-10-12 VITALS
WEIGHT: 170 LBS | OXYGEN SATURATION: 98 % | BODY MASS INDEX: 25.18 KG/M2 | HEIGHT: 69 IN | DIASTOLIC BLOOD PRESSURE: 60 MMHG | SYSTOLIC BLOOD PRESSURE: 110 MMHG | HEART RATE: 70 BPM

## 2021-10-12 DIAGNOSIS — I65.23 CAROTID STENOSIS, ASYMPTOMATIC, BILATERAL: ICD-10-CM

## 2021-10-12 DIAGNOSIS — E78.2 MIXED HYPERLIPIDEMIA: Primary | ICD-10-CM

## 2021-10-12 PROCEDURE — 99214 OFFICE O/P EST MOD 30 MIN: CPT | Performed by: NURSE PRACTITIONER

## 2021-10-12 NOTE — PATIENT INSTRUCTIONS
The results of your carotid ultrasound shows moderate disease of the right carotid and is stable from previous exam, ultrasound of the left carotid shows moderate disease and is stable from previous exam.    Follow up in 6 months    If you should experience any neurological symptoms including but not limited to visual or speech disturbances confusion, seizures, or weakness of limbs of one side of your body notify [facility] and Vascular center immediately for evaluation or if after hours present to the nearest Emergency Department.

## 2021-10-12 NOTE — PROGRESS NOTES
CVTS Office Progress Note     10/12/2021    David Chacon  1940    Chief Complaint:    Chief Complaint   Patient presents with   • Carotid Artery Disease       HPI:      PCP:  Yehuda Victor MD  Cardiology:  Dr. Blevins     80 y.o. male with HTN(stable, increased risk stroke, rupture), Hyperlipidemia(stable, increased risk cardiovascular events), Diabetes Mellitus(stable, increased risk cardiovascular events) and Chronic Kidney Disease(stable, increased risk renal failure) carotid stenosis, left retinal artery embolization 2017.  former smoker and quit 1985.  Former patient of Dr. Hamman, established for surveillance of carotid stenosis.  SH includes pericardectomy 1972 for pericarditis, has sternotomy incision.  Retinal artery embolization 2017, echo negative for embolic source, carotid ultrasound with mild plaque burden, CTA carotid not obtained at that time due to borderline GFR.  Still has partial vision loss of left eye but did improve some on DAPT, Statin.  He returns today in follow up.  No other associated signs, symptoms or modifying factors.    4/2017 US Aorta: negative for AAA    4/2017 TTE: EF 55%, grade I DD, mild TR    4/2017 Carotid Duplex: ROSALIO 0-49% mPSV 104c/s Ratio , LICA 50-69%% mPSV 151c/s Ratio, Antegrade vertebrals  12/2017 Carotid Duplex: ROSALIO 0-49% mPSV 119c/s Ratio 2.2, LICA 0-49% mPSV 115c/s Ratio 2.0, Antegrade vertebrals  1/2019 Carotid Duplex: ROSALIO 0-49% mPSV 100c/s Ratio 1.3, LICA 50-69% mPSV 130c/s Ratio 1.9, Antegrade vertebrals  10/2021 Carotid Duplex: ROSALIO 50-69% mPSV 139c/s Ratio 1.7, LICA 50-69% mPSV 184c/s Ratio 1.9, Antegrade vertebrals        The following portions of the patient's history were reviewed and updated as appropriate: allergies, current medications, past family history, past medical history, past social history, past surgical history and problem list.  Recent images independently reviewed.  Available laboratory values reviewed.    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)     EDEMA     Past Surgical History:   Procedure Laterality Date   • CARDIAC CATHETERIZATION     • CATARACT EXTRACTION EXTRACAPSULAR W/ INTRAOCULAR LENS IMPLANTATION     • LAMINOTOMY Left 01/06/1969    Exc HNP 5th lumbar   • OTHER SURGICAL HISTORY  05/31/2016    EXTENDED VISUAL FIELDS STUDY 08163 (Open angle with borderline findings, low risk, unspecified eye   • OTHER SURGICAL HISTORY  04/26/2016    FINDINGS OF DIL MAC OR FUND EXAM COMM TO MD MayF (Type 2 diabetes mellitus 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   • OTHER SURGICAL HISTORY  04/26/2016    OCT DISC NFL 13707 (Type 2 diabetes mellitus 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)    • OTHER SURGICAL HISTORY  09/30/2015    OCT SCAN RETINA 42903 (Epiretinal membrane   • PERICARDIECTOMY  08/04/1972    Pericarditis etiol undetermined     Family History   Problem Relation Age of Onset   • Heart disease Mother         MI   • Heart disease Father         MI     Social History     Tobacco Use   • Smoking status: Former Smoker   • Smokeless tobacco: Never Used   Substance Use Topics   • Alcohol use: No   • Drug use: No       ALLERGIES:  Allergies   Allergen Reactions   • Olmesartan Other (See Comments)     Patient had cough and dry skin to generic olmsartan. He requires branded Benicar   • Lisinopril Cough     cough    • Hydrocodone Cough         MEDICATIONS:    Current Outpatient Medications:   •  aspirin 81 MG tablet, Take 1 tablet by mouth Daily., Disp: 30 tablet, Rfl: 11  •  atorvastatin (LIPITOR) 20 MG tablet, Take 20 mg by mouth Daily., Disp: , Rfl:   •  chlorhexidine (PERIDEX) 0.12 % solution, Apply 15 mL to the mouth or throat 2 (Two) Times a Day., Disp: , Rfl:   •  Cholecalciferol (Vitamin D3) 10 MCG (400 UNIT) capsule, Take  by mouth Daily., Disp: , Rfl:   •  Cinnamon 500 MG tablet, Take 1,000 mg by mouth 2 (Two) Times a Day., Disp: , Rfl:   •  clopidogrel (PLAVIX) 75 MG tablet, Take 1 tablet by mouth Daily., Disp: 30 tablet, Rfl: 11  •  folic acid (FOLVITE) 1 MG tablet, Take 1 mg by mouth Daily., Disp: , Rfl:   •  glimepiride (AMARYL) 4 MG tablet, Take 4 mg by mouth Every Morning Before Breakfast., Disp: , Rfl:   •  irbesartan (AVAPRO) 75 MG tablet, Take 75 mg by mouth Every Night., Disp: , Rfl:   •  latanoprost (XALATAN) 0.005 % ophthalmic solution, ADMINISTER 1 DROP TO BOTH EYES EVERY NIGHT., Disp: 2.5 mL, Rfl: 12  •  Omega-3 Fatty Acids (FISH OIL) 1000 MG capsule capsule, Take  by mouth Daily With Breakfast., Disp: , Rfl:   •  ondansetron ODT (ZOFRAN-ODT) 4 MG disintegrating tablet, Place 1 tablet on the tongue Every 8 (Eight) Hours As Needed for Nausea or Vomiting., Disp: 10 tablet, Rfl: 0  •  sitaGLIPtin (JANUVIA) 100 MG tablet, Take 100 mg by mouth Daily., Disp: , Rfl:   •  timolol (TIMOPTIC) 0.5 % ophthalmic solution, Administer 1 drop to both eyes Take As Directed., Disp: , Rfl:   •  vitamin C (ASCORBIC ACID) 250 MG tablet, Take 500 mg by mouth Daily., Disp: , Rfl:   •  Zinc 50 MG capsule, Take 50 mg by mouth Daily., Disp: , Rfl:       Review of Systems   Constitutional: Negative for chills, decreased appetite, fever and weight loss.   HENT: Negative for congestion, nosebleeds and sore throat.    Eyes: Negative for blurred vision, visual disturbance and visual halos.   Cardiovascular: Positive for dyspnea on  "exertion. Negative for chest pain and leg swelling.   Respiratory: Negative for cough, shortness of breath, sputum production and wheezing.    Endocrine: Negative for cold intolerance and polyuria.   Hematologic/Lymphatic: Negative for bleeding problem. Does not bruise/bleed easily.   Skin: Positive for unusual hair distribution. Negative for flushing and nail changes.   Musculoskeletal: Positive for arthritis, back pain and joint pain.   Gastrointestinal: Negative for bloating, abdominal pain, hematemesis, melena, nausea and vomiting.   Genitourinary: Negative for flank pain and hematuria.   Neurological: Negative for brief paralysis, difficulty with concentration, focal weakness, light-headedness, loss of balance, numbness, paresthesias and weakness.        Occasional difficulty with memory   Psychiatric/Behavioral: Negative for altered mental status, depression, substance abuse and suicidal ideas.   Allergic/Immunologic: Negative for hives and persistent infections.         Vitals:    10/12/21 0932   BP: 110/60   BP Location: Left arm   Patient Position: Sitting   Cuff Size: Adult   Pulse: 70   SpO2: 98%   Weight: 77.1 kg (170 lb)   Height: 175.3 cm (69\")     Physical Exam  Vitals and nursing note reviewed.   Constitutional:       Appearance: Normal appearance.   HENT:      Head: Normocephalic.      Nose: Nose normal.      Mouth/Throat:      Mouth: Mucous membranes are moist.   Eyes:      Extraocular Movements: Extraocular movements intact.      Pupils: Pupils are equal, round, and reactive to light.   Cardiovascular:      Rate and Rhythm: Normal rate.      Pulses: Normal pulses.   Pulmonary:      Effort: Pulmonary effort is normal.   Abdominal:      General: Abdomen is flat.      Palpations: Abdomen is soft.   Musculoskeletal:         General: Normal range of motion.      Cervical back: Normal range of motion.   Skin:     General: Skin is warm and dry.      Capillary Refill: Capillary refill takes 2 to 3 " seconds.   Neurological:      General: No focal deficit present.      Mental Status: He is alert and oriented to person, place, and time. Mental status is at baseline.      Sensory: Sensory deficit (left eye, chornic) present.   Psychiatric:         Mood and Affect: Mood normal.         Behavior: Behavior normal.         Assessment & Plan     Independent Review of Studies  10/2021 Carotid Duplex: ROSALIO 50-69% mPSV 139c/s Ratio 1.7, LICA 50-69% mPSV 184c/s Ratio 1.9, Antegrade vertebrals    1. Mixed hyperlipidemia  Lipid-lowering therapy has been proven beneficial in patients with cardio-vascular disease. Current guidelines recommend statin treatment for all patients with PAD,CAD and carotid stenosis. Statins are beneficial in preventing cardiovascular events, increasing functional capacity and lower the risk of adverse limb loss in PAD. Statins decrease the progression of plaque formation and may improve peripheral vessel lining, and aid in reversing atherosclerosis.    2. Carotid stenosis, asymptomatic, bilateral  Symptomatically stable.   Moderate bilateral ICA disease, left worse than right    Remains on DAPT, Statin.     Repeat duplex in 6 months    - Duplex Carotid Ultrasound CAR; Future    Detailed discussion regarding risks, benefits, and treatment plan. Images independently reviewed. Patient understands, agrees, and wishes to proceed with plan.       This document has been electronically signed by Juan Luis Vásquez AGACNP-BC @  On October 12, 2021 10:56 CDT      EMR Dragon/Transcription disclaimer:   Part of this note may be an electronic transcription/translation of spoken language to printed text using the Dragon Dictation System.

## 2022-03-03 ENCOUNTER — TRANSCRIBE ORDERS (OUTPATIENT)
Dept: PODIATRY | Facility: CLINIC | Age: 82
End: 2022-03-03

## 2022-03-03 DIAGNOSIS — M79.674 PAIN OF TOE OF RIGHT FOOT: Primary | ICD-10-CM

## 2022-03-07 ENCOUNTER — OFFICE VISIT (OUTPATIENT)
Dept: PODIATRY | Facility: CLINIC | Age: 82
End: 2022-03-07

## 2022-03-07 VITALS — OXYGEN SATURATION: 99 % | HEIGHT: 69 IN | WEIGHT: 170 LBS | HEART RATE: 58 BPM | BODY MASS INDEX: 25.18 KG/M2

## 2022-03-07 DIAGNOSIS — M20.5X1 ACQUIRED ADDUCTOVARUS ROTATION OF TOE OF RIGHT FOOT: ICD-10-CM

## 2022-03-07 DIAGNOSIS — M79.671 RIGHT FOOT PAIN: Primary | ICD-10-CM

## 2022-03-07 DIAGNOSIS — L84 CORNS: ICD-10-CM

## 2022-03-07 PROCEDURE — 99203 OFFICE O/P NEW LOW 30 MIN: CPT | Performed by: PODIATRIST

## 2022-03-07 NOTE — PROGRESS NOTES
David Chacon  1940  81 y.o. male  PCP: Yehuda Victor 2/22/22  A1C: 7.0    Right foot pain 5th toe    03/07/2022    Chief Complaint   Patient presents with   • Right Foot - Pain       History of Present Illness    David Chacon is a 81 y.o.male who presents to clinic with chief complaint of right foot pain.  Pain is localized to the right fifth toe.      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)     EDEMA         Past Surgical History:   Procedure Laterality Date   • CARDIAC CATHETERIZATION     • CATARACT EXTRACTION EXTRACAPSULAR W/ INTRAOCULAR LENS IMPLANTATION     • LAMINOTOMY Left 01/06/1969    Exc HNP 5th lumbar   • OTHER SURGICAL HISTORY  05/31/2016    EXTENDED VISUAL FIELDS STUDY 18588 (Open angle with borderline findings, low risk, unspecified eye   • OTHER SURGICAL HISTORY  04/26/2016    FINDINGS OF DIL MAC OR FUND EXAM COMM TO MD 5010F (Type 2 diabetes mellitus 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   • OTHER SURGICAL HISTORY  04/26/2016    OCT DISC NFL 67461 (Type 2 diabetes 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 He requires branded Benicar   • Lisinopril Cough     cough   • Hydrocodone Cough       Social History     Socioeconomic History   • Marital status:    Tobacco Use   • Smoking status: Former Smoker   • Smokeless tobacco: Never Used   Substance and Sexual Activity   • Alcohol use: No   • Drug use: No   • Sexual activity: Defer         Current Outpatient Medications   Medication Sig Dispense Refill   • aspirin 81 MG tablet Take 1 tablet by mouth Daily. 30 tablet 11   • atorvastatin (LIPITOR) 20 MG tablet Take 20 mg by mouth Daily.     • chlorhexidine (PERIDEX) 0.12 % solution Apply 15 mL to the mouth or throat 2 (Two) Times a Day.     • Cholecalciferol (Vitamin D3) 10 MCG (400 UNIT) capsule Take  by mouth Daily.     • Cinnamon 500 MG tablet Take 1,000 mg by mouth 2 (Two) Times a Day.     • clopidogrel (PLAVIX) 75 MG tablet Take 1 tablet by mouth Daily. 30 tablet 11   • folic acid (FOLVITE) 1 MG tablet Take 1 mg by mouth Daily.     • glimepiride (AMARYL) 4 MG tablet Take 4 mg by mouth Every Morning Before Breakfast.     • irbesartan (AVAPRO) 75 MG tablet Take 75 mg by mouth Every Night.     • latanoprost (XALATAN) 0.005 % ophthalmic solution ADMINISTER 1 DROP TO BOTH EYES EVERY NIGHT. 2.5 mL 12   • Omega-3 Fatty Acids (FISH OIL) 1000 MG capsule capsule Take  by mouth Daily With Breakfast.     • sitaGLIPtin (JANUVIA) 100 MG tablet Take 100 mg by mouth Daily.     • timolol (TIMOPTIC) 0.5 % ophthalmic solution Administer 1 drop to both eyes Take As Directed.     • vitamin C (ASCORBIC ACID) 250 MG tablet Take 500 mg by mouth Daily.     • Zinc 50 MG capsule Take 50 mg by mouth Daily.       No current facility-administered medications for this visit.       Review of Systems   Musculoskeletal:        Right foot pain    All other systems reviewed and are  "negative.        OBJECTIVE    Pulse 58   Ht 175.3 cm (69\")   Wt 77.1 kg (170 lb)   SpO2 99%   BMI 25.10 kg/m²     Physical Exam  Vitals reviewed.   Constitutional:       General: He is not in acute distress.     Appearance: He is well-developed.   HENT:      Head: Normocephalic and atraumatic.      Nose: Nose normal.   Eyes:      Conjunctiva/sclera: Conjunctivae normal.      Pupils: Pupils are equal, round, and reactive to light.   Pulmonary:      Effort: Pulmonary effort is normal. No respiratory distress.      Breath sounds: No wheezing.   Musculoskeletal:         General: Tenderness present. No deformity. Normal range of motion.   Skin:     General: Skin is warm and dry.      Capillary Refill: Capillary refill takes less than 2 seconds.   Neurological:      Mental Status: He is alert and oriented to person, place, and time.   Psychiatric:         Behavior: Behavior normal.         Thought Content: Thought content normal.          Right  Lower Extremity Exam:     Cardiovascular:    Palpable pedal pulses  Musculoskeletal:  Strength WNL  Dermatological:   Hyperkeratotic lesion noted to medial right fifth toe.  Pain on direct palpation.  Neurological:   Sensation intact light touch      Foot/Ankle Exam        Procedures        ASSESSMENT AND PLAN    Diagnoses and all orders for this visit:    1. Right foot pain (Primary)    2. Corns    3. 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  No acute pathology.  -Dispensed gel toe sleeve.  Recommended wide toe box shoes.  - All questions were answered   - RTC as needed            This document has been electronically signed by Alphonso Busch DPM on March 13, 2022 11:18 CDT     3/13/2022  11:18 CDT    "

## 2022-03-10 ENCOUNTER — PATIENT ROUNDING (BHMG ONLY) (OUTPATIENT)
Dept: PODIATRY | Facility: CLINIC | Age: 82
End: 2022-03-10

## 2022-03-10 NOTE — PROGRESS NOTES
March 10, 2022    Hello, may I speak with David Chacon?    My name is KENZIE PIEDRA     I am  with [facility] SURG [facility]  [address]   [place] [address] [phone].    Before we get started may I verify your date of birth? 1940    I am calling to officially welcome you to our practice and ask about your recent visit. Is this a good time to talk? no    Tell me about your visit with us. What things went well?  EVERYTHING WENT FINE, PATIENT WAS SATISFIED WITH EVERYONE IN THE OFFICE.        We're always looking for ways to make our patients' experiences even better. Do you have recommendations on ways we may improve?  no    Overall were you satisfied with your first visit to our practice? yes       I appreciate you taking the time to speak with me today. Is there anything else I can do for you? no      Thank you, and have a great day.

## 2022-04-14 ENCOUNTER — OFFICE VISIT (OUTPATIENT)
Dept: CARDIAC SURGERY | Facility: CLINIC | Age: 82
End: 2022-04-14

## 2022-04-14 VITALS
BODY MASS INDEX: 26.36 KG/M2 | HEART RATE: 52 BPM | DIASTOLIC BLOOD PRESSURE: 64 MMHG | TEMPERATURE: 97.1 F | OXYGEN SATURATION: 96 % | WEIGHT: 178 LBS | SYSTOLIC BLOOD PRESSURE: 120 MMHG | HEIGHT: 69 IN

## 2022-04-14 DIAGNOSIS — E78.2 MIXED HYPERLIPIDEMIA: ICD-10-CM

## 2022-04-14 DIAGNOSIS — I65.23 CAROTID STENOSIS, ASYMPTOMATIC, BILATERAL: Primary | ICD-10-CM

## 2022-04-14 PROCEDURE — 99213 OFFICE O/P EST LOW 20 MIN: CPT | Performed by: NURSE PRACTITIONER

## 2022-04-14 RX ORDER — FLUOXETINE HYDROCHLORIDE 20 MG/1
20 CAPSULE ORAL DAILY
COMMUNITY

## 2022-04-14 RX ORDER — DEXAMETHASONE 6 MG/1
6 TABLET ORAL
COMMUNITY

## 2022-04-14 RX ORDER — BUDESONIDE AND FORMOTEROL FUMARATE DIHYDRATE 160; 4.5 UG/1; UG/1
2 AEROSOL RESPIRATORY (INHALATION)
COMMUNITY

## 2022-04-14 RX ORDER — ALBUTEROL SULFATE 90 UG/1
2 AEROSOL, METERED RESPIRATORY (INHALATION) EVERY 4 HOURS PRN
COMMUNITY

## 2022-04-14 NOTE — PATIENT INSTRUCTIONS
The results of your carotid ultrasound shows mild disease of the right carotid and is stable from previous exam, ultrasound of the left carotid shows mild disease and is stable from previous exam.    Follow up in 12 months    If you should experience any neurological symptoms including but not limited to visual or speech disturbances confusion, seizures, or weakness of limbs of one side of your body notify Heart and Vascular center immediately for evaluation or if after hours present to the nearest Emergency Department.

## 2022-04-19 NOTE — PROGRESS NOTES
CVTS Office Progress Note     4/19/2022    David Chacon  1940    Chief Complaint:    Chief Complaint   Patient presents with   • carotid stenosis       HPI:      PCP:  Yehuda Victor MD  Cardiology:  Dr. Blevins     81 y.o. male with HTN(stable, increased risk stroke, rupture), Hyperlipidemia(stable, increased risk cardiovascular events), Diabetes Mellitus(stable, increased risk cardiovascular events) and Chronic Kidney Disease(stable, increased risk renal failure) carotid stenosis, left retinal artery embolization 2017.  former smoker and quit 1985.  Former patient of Dr. Hamman, established for surveillance of carotid stenosis.  SH includes pericardectomy 1972 for pericarditis, has sternotomy incision.  Retinal artery embolization 2017, echo negative for embolic source, carotid ultrasound with mild plaque burden, CTA carotid not obtained at that time due to borderline GFR.  Still has partial vision loss of left eye but did improve some on DAPT, Statin.  He returns today in follow up.  No other associated signs, symptoms or modifying factors.    4/2017 US Aorta: negative for AAA    4/2017 TTE: EF 55%, grade I DD, mild TR    4/2017 Carotid Duplex: ROSALIO 0-49% mPSV 104c/s Ratio , LICA 50-69%% mPSV 151c/s Ratio, Antegrade vertebrals  12/2017 Carotid Duplex: ROSALIO 0-49% mPSV 119c/s Ratio 2.2, LICA 0-49% mPSV 115c/s Ratio 2.0, Antegrade vertebrals  1/2019 Carotid Duplex: ROSALIO 0-49% mPSV 100c/s Ratio 1.3, LICA 50-69% mPSV 130c/s Ratio 1.9, Antegrade vertebrals  10/2021 Carotid Duplex: ROSALIO 50-69% mPSV 139c/s Ratio 1.7, LICA 50-69% mPSV 184c/s Ratio 1.9, Antegrade vertebrals  4/2022 Carotid Duplex: ROSALIO 0-49% mPSV 88c/s Ratio 1.9, LICA 0-49% mPSV 106c/s Ratio 2.3, Antegrade vertebrals      The following portions of the patient's history were reviewed and updated as appropriate: allergies, current medications, past family history, past medical history, past social history, past surgical history and  problem list.  Recent images independently reviewed.  Available laboratory values reviewed.    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)     EDEMA     Past Surgical History:   Procedure Laterality Date   • CARDIAC CATHETERIZATION     • CATARACT EXTRACTION EXTRACAPSULAR W/ INTRAOCULAR LENS IMPLANTATION     • LAMINOTOMY Left 01/06/1969    Exc HNP 5th lumbar   • OTHER SURGICAL HISTORY  05/31/2016    EXTENDED VISUAL FIELDS STUDY 41472 (Open angle with borderline findings, low risk, unspecified eye   • OTHER SURGICAL HISTORY  04/26/2016    FINDINGS OF DIL MAC OR FUND EXAM COMM TO MD 5010F (Type 2 diabetes mellitus 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   • OTHER SURGICAL HISTORY  04/26/2016    OCT DISC [facility] 80054 (Type 2 diabetes mellitus 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)    • OTHER SURGICAL HISTORY  09/30/2015    OCT SCAN RETINA 37663 (Epiretinal membrane   • PERICARDIECTOMY  08/04/1972    Pericarditis etiol undetermined     Family History   Problem Relation Age of Onset   • Heart disease Mother         MI   • Heart disease Father         MI     Social History     Tobacco Use   • Smoking status: Former Smoker   • Smokeless tobacco: Never Used   Substance Use Topics   • Alcohol use: No   • Drug use: No       ALLERGIES:  Allergies   Allergen Reactions   • Olmesartan Other (See Comments)     Patient  had cough and dry skin to generic olmsartan. He requires branded Benicar   • Lisinopril Cough     cough   • Hydrocodone Cough         MEDICATIONS:    Current Outpatient Medications:   •  albuterol sulfate  (90 Base) MCG/ACT inhaler, Inhale 2 puffs Every 4 (Four) Hours As Needed for Wheezing., Disp: , Rfl:   •  aspirin 81 MG tablet, Take 1 tablet by mouth Daily., Disp: 30 tablet, Rfl: 11  •  atorvastatin (LIPITOR) 20 MG tablet, Take 20 mg by mouth Daily., Disp: , Rfl:   •  budesonide-formoterol (SYMBICORT) 160-4.5 MCG/ACT inhaler, Inhale 2 puffs 2 (Two) Times a Day., Disp: , Rfl:   •  chlorhexidine (PERIDEX) 0.12 % solution, Apply 15 mL to the mouth or throat 2 (Two) Times a Day., Disp: , Rfl:   •  Cholecalciferol (Vitamin D3) 10 MCG (400 UNIT) capsule, Take  by mouth Daily., Disp: , Rfl:   •  Cinnamon 500 MG tablet, Take 1,000 mg by mouth 2 (Two) Times a Day., Disp: , Rfl:   •  clopidogrel (PLAVIX) 75 MG tablet, Take 1 tablet by mouth Daily., Disp: 30 tablet, Rfl: 11  •  dexamethasone (DECADRON) 6 MG tablet, Take 6 mg by mouth., Disp: , Rfl:   •  FLUoxetine (PROzac) 20 MG capsule, Take 20 mg by mouth Daily., Disp: , Rfl:   •  folic acid (FOLVITE) 1 MG tablet, Take 1 mg by mouth Daily., Disp: , Rfl:   •  glimepiride (AMARYL) 4 MG tablet, Take 4 mg by mouth Every Morning Before Breakfast., Disp: , Rfl:   •  irbesartan (AVAPRO) 75 MG tablet, Take 75 mg by mouth Every Night., Disp: , Rfl:   •  latanoprost (XALATAN) 0.005 % ophthalmic solution, ADMINISTER 1 DROP TO BOTH EYES EVERY NIGHT., Disp: 2.5 mL, Rfl: 12  •  Omega-3 Fatty Acids (FISH OIL) 1000 MG capsule capsule, Take  by mouth Daily With Breakfast., Disp: , Rfl:   •  sitaGLIPtin (JANUVIA) 100 MG tablet, Take 100 mg by mouth Daily., Disp: , Rfl:   •  timolol (TIMOPTIC) 0.5 % ophthalmic solution, Administer 1 drop to both eyes Take As Directed., Disp: , Rfl:   •  vitamin C (ASCORBIC ACID) 250 MG tablet, Take 500 mg by mouth Daily., Disp: , Rfl:   •  Zinc 50  "MG capsule, Take 50 mg by mouth Daily., Disp: , Rfl:       Review of Systems   Constitutional: Negative for chills, decreased appetite, fever and weight loss.   HENT: Negative for congestion, nosebleeds and sore throat.    Eyes: Negative for blurred vision, visual disturbance and visual halos.   Cardiovascular: Positive for dyspnea on exertion. Negative for chest pain and leg swelling.   Respiratory: Negative for cough, shortness of breath, sputum production and wheezing.    Endocrine: Negative for cold intolerance and polyuria.   Hematologic/Lymphatic: Negative for bleeding problem. Does not bruise/bleed easily.   Skin: Positive for unusual hair distribution. Negative for flushing and nail changes.   Musculoskeletal: Positive for arthritis, back pain and joint pain.   Gastrointestinal: Negative for bloating, abdominal pain, hematemesis, melena, nausea and vomiting.   Genitourinary: Negative for flank pain and hematuria.   Neurological: Negative for brief paralysis, difficulty with concentration, focal weakness, light-headedness, loss of balance, numbness, paresthesias and weakness.        Occasional difficulty with memory   Psychiatric/Behavioral: Negative for altered mental status, depression, substance abuse and suicidal ideas.   Allergic/Immunologic: Negative for hives and persistent infections.         Vitals:    04/14/22 1016   BP: 120/64   BP Location: Left arm   Patient Position: Sitting   Cuff Size: Adult   Pulse: 52   Temp: 97.1 °F (36.2 °C)   SpO2: 96%   Weight: 80.7 kg (178 lb)   Height: 175.3 cm (69\")     Physical Exam  Vitals and nursing note reviewed.   Constitutional:       Appearance: Normal appearance.   HENT:      Head: Normocephalic.      Nose: Nose normal.      Mouth/Throat:      Mouth: Mucous membranes are moist.   Eyes:      Extraocular Movements: Extraocular movements intact.      Pupils: Pupils are equal, round, and reactive to light.   Cardiovascular:      Rate and Rhythm: Normal rate.      " Pulses: Normal pulses.   Pulmonary:      Effort: Pulmonary effort is normal.   Abdominal:      General: Abdomen is flat.      Palpations: Abdomen is soft.   Musculoskeletal:         General: Normal range of motion.      Cervical back: Normal range of motion.   Skin:     General: Skin is warm and dry.      Capillary Refill: Capillary refill takes 2 to 3 seconds.   Neurological:      General: No focal deficit present.      Mental Status: He is alert and oriented to person, place, and time. Mental status is at baseline.      Sensory: Sensory deficit (left eye, chornic) present.   Psychiatric:         Mood and Affect: Mood normal.         Behavior: Behavior normal.         Assessment & Plan     Independent Review of Studies  4/2022 Carotid Duplex: ROSALIO 0-49% mPSV 88c/s Ratio 1.9, LICA 0-49% mPSV 106c/s Ratio 2.3, Antegrade vertebrals    1. Carotid stenosis, asymptomatic, bilateral  Mild bilateral ICA disease.  Velocities lower than prior exam.   Asymptomatic    DAPT, Statin    Repeat duplex in one year    - Duplex Carotid Ultrasound CAR; Future    2. Mixed hyperlipidemia  Lipid-lowering therapy has been proven beneficial in patients with cardio-vascular disease. Current guidelines recommend statin treatment for all patients with PAD,CAD and carotid stenosis. Statins are beneficial in preventing cardiovascular events, increasing functional capacity and lower the risk of adverse limb loss in PAD. Statins decrease the progression of plaque formation and may improve peripheral vessel lining, and aid in reversing atherosclerosis.      Detailed discussion regarding risks, benefits, and treatment plan. Images independently reviewed. Patient understands, agrees, and wishes to proceed with plan.       This document has been electronically signed by Juan Luis Vásquez, AGACNP-BC @  On April 19, 2022 15:23 CDT

## 2023-03-16 ENCOUNTER — HOSPITAL ENCOUNTER (OUTPATIENT)
Dept: ULTRASOUND IMAGING | Facility: HOSPITAL | Age: 83
Discharge: HOME | End: 2023-03-16
Payer: COMMERCIAL

## 2023-03-16 DIAGNOSIS — N18.32 CHRONIC KIDNEY DISEASE (CKD) STAGE G3B/A1, MODERATELY DECREASED GLOMERULAR FILTRATION RATE (GFR) BETWEEN 30-44 ML/MIN/1.73 SQUARE METER AND ALBUMINURIA CREATININE RATIO LESS THAN 30 MG/G (CMS/H*: ICD-10-CM

## 2023-03-16 PROCEDURE — 76775 US EXAM ABDO BACK WALL LIM: CPT

## 2023-03-16 PROCEDURE — 51798 US URINE CAPACITY MEASURE: CPT

## 2023-03-17 ENCOUNTER — HOSPITAL ENCOUNTER (EMERGENCY)
Facility: HOSPITAL | Age: 83
Discharge: HOME | End: 2023-03-17
Attending: FAMILY MEDICINE | Admitting: FAMILY MEDICINE
Payer: COMMERCIAL

## 2023-03-17 VITALS
HEIGHT: 67 IN | HEART RATE: 62 BPM | OXYGEN SATURATION: 95 % | DIASTOLIC BLOOD PRESSURE: 73 MMHG | RESPIRATION RATE: 18 BRPM | TEMPERATURE: 99 F | SYSTOLIC BLOOD PRESSURE: 176 MMHG | WEIGHT: 172 LBS | BODY MASS INDEX: 27 KG/M2

## 2023-03-17 DIAGNOSIS — N30.00 ACUTE CYSTITIS WITHOUT HEMATURIA: Primary | ICD-10-CM

## 2023-03-17 LAB
BACTERIA UR QL AUTO: ABNORMAL /HPF
BILIRUB UR QL STRIP: NEGATIVE
CLARITY UR: CLEAR
COLOR UR: YELLOW
GLUCOSE UR STRIP-MCNC: ABNORMAL MG/DL
HGB UR QL STRIP.AUTO: NEGATIVE
HYALINE CASTS UR QL AUTO: ABNORMAL /LPF
KETONES UR QL STRIP: ABNORMAL
LEUKOCYTE ESTERASE UR QL STRIP.AUTO: ABNORMAL
NITRITE UR QL STRIP: NEGATIVE
PH UR STRIP.AUTO: 5.5 [PH] (ref 5–9)
PROT UR QL STRIP: ABNORMAL
RBC # UR STRIP: ABNORMAL /HPF
REF LAB TEST METHOD: ABNORMAL
SP GR UR STRIP: 1.02 (ref 1–1.03)
SQUAMOUS #/AREA URNS HPF: ABNORMAL /HPF
UROBILINOGEN UR QL STRIP: ABNORMAL
WBC # UR STRIP: ABNORMAL /HPF

## 2023-03-17 PROCEDURE — 81001 URINALYSIS AUTO W/SCOPE: CPT | Performed by: FAMILY MEDICINE

## 2023-03-17 PROCEDURE — 99283 EMERGENCY DEPT VISIT LOW MDM: CPT

## 2023-03-17 RX ORDER — CEFDINIR 300 MG/1
300 CAPSULE ORAL 2 TIMES DAILY
Qty: 14 CAPSULE | Refills: 0 | Status: SHIPPED | OUTPATIENT
Start: 2023-03-17

## 2023-03-17 RX ORDER — CALCITRIOL 0.25 UG/1
0.25 CAPSULE, LIQUID FILLED ORAL DAILY
COMMUNITY

## 2023-03-17 NOTE — ED PROVIDER NOTES
Subjective   History of Present Illness  Patient is a 21 years old with past medical history of diabetes and hypertension who recently had a cystoscopy done 2 weeks ago presented here today because of pain during urination.  Denies any fever chills or sweating.  Denies any abdominal pain.    History provided by:  Patient   used: No        Review of Systems   Genitourinary: Positive for dysuria.   All other systems reviewed and are negative.      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   • OTHER SURGICAL HISTORY  04/26/2016    OCT DISC [facility] 40391 (Type 2 diabetes mellitus with mild  nonproliferative diabetic retinopathy without macular edema)    • OTHER SURGICAL HISTORY  09/30/2015    OCT SCAN RETINA 15973 (Epiretinal membrane   • PERICARDIECTOMY  08/04/1972    Pericarditis etiol undetermined       Family History   Problem Relation Age of Onset   • Heart disease Mother         MI   • Heart disease Father         MI       Social History     Socioeconomic History   • Marital status:    Tobacco Use   • Smoking status: Former   • Smokeless tobacco: Never   Substance and Sexual Activity   • Alcohol use: No   • Drug use: No   • Sexual activity: Defer           Objective   Physical Exam  Vitals and nursing note reviewed.   Constitutional:       Appearance: Normal appearance. He is normal weight.   HENT:      Head: Normocephalic and atraumatic.      Right Ear: Tympanic membrane, ear canal and external ear normal.      Left Ear: Tympanic membrane and ear canal normal.      Nose: Nose normal.   Eyes:      Extraocular Movements: Extraocular movements intact.      Conjunctiva/sclera: Conjunctivae normal.      Pupils: Pupils are equal, round, and reactive to light.   Cardiovascular:      Rate and Rhythm: Normal rate and regular rhythm.      Pulses: Normal pulses.      Heart sounds: Normal heart sounds.   Pulmonary:      Effort: Pulmonary effort is normal.      Breath sounds: Normal breath sounds.   Abdominal:      General: Abdomen is flat. Bowel sounds are normal.      Palpations: Abdomen is soft.   Musculoskeletal:         General: Normal range of motion.      Cervical back: Normal range of motion and neck supple.   Skin:     General: Skin is warm.      Capillary Refill: Capillary refill takes less than 2 seconds.   Neurological:      General: No focal deficit present.      Mental Status: He is alert and oriented to person, place, and time.   Psychiatric:         Mood and Affect: Mood normal.         Behavior: Behavior normal.         Procedures           ED Course            Labs Reviewed    URINALYSIS W/ MICROSCOPIC IF INDICATED (NO CULTURE) - Abnormal; Notable for the following components:       Result Value    Glucose,  mg/dL (Trace) (*)     Ketones, UA Trace (*)     Protein, UA 30 mg/dL (1+) (*)     Leuk Esterase, UA Small (1+) (*)     All other components within normal limits   URINALYSIS, MICROSCOPIC ONLY - Abnormal; Notable for the following components:    RBC, UA 0-2 (*)     WBC, UA 13-20 (*)     All other components within normal limits       No orders to display                                     Medical Decision Making  The patient is 82 years old who presented here today because of dysuria.  Patient said that he just had a cystoscopy done by urologist.  That since then she has been having dysuria.  UA was done which showed that patient had some white blood cells in the urine.  Patient will be treated for UTI.  And told to follow-up with urologist in 3 to 4 days.  Come back to the ER symptoms get worse.        Final diagnoses:   Acute cystitis without hematuria       ED Disposition  ED Disposition     ED Disposition   Discharge    Condition   Stable    Comment   --             Yehuda Victor MD  [address] [address]  [phone]    In 3 days      David Tabor MD  [address] [address]  [phone]    In 3 days           Medication List      New Prescriptions    cefdinir 300 MG capsule  Commonly known as: OMNICEF  Take 1 capsule by mouth 2 (Two) Times a Day.        Changed    latanoprost 0.005 % ophthalmic solution  Commonly known as: XALATAN  ADMINISTER 1 DROP TO BOTH EYES EVERY NIGHT.  What changed: when to take this           Where to Get Your Medications      These medications were sent to [place], [place] - [facility] - [phone]  - [phone] [address] [address]    Phone: [phone]   · cefdinir 300 MG capsule          Walter Moody MD  03/17/23 0190

## 2023-03-17 NOTE — DISCHARGE INSTRUCTIONS
Results were discussed with patient.  Patient was told to take medication as directed.  Come back to the ER symptoms get worse.

## 2023-04-27 ENCOUNTER — OFFICE VISIT (OUTPATIENT)
Dept: CARDIAC SURGERY | Facility: CLINIC | Age: 83
End: 2023-04-27
Payer: COMMERCIAL

## 2023-04-27 VITALS
SYSTOLIC BLOOD PRESSURE: 132 MMHG | OXYGEN SATURATION: 99 % | WEIGHT: 172 LBS | HEART RATE: 60 BPM | BODY MASS INDEX: 27 KG/M2 | HEIGHT: 67 IN | DIASTOLIC BLOOD PRESSURE: 77 MMHG

## 2023-04-27 DIAGNOSIS — E78.2 MIXED HYPERLIPIDEMIA: ICD-10-CM

## 2023-04-27 DIAGNOSIS — I65.23 CAROTID STENOSIS, ASYMPTOMATIC, BILATERAL: Primary | ICD-10-CM

## 2023-04-27 PROCEDURE — 99213 OFFICE O/P EST LOW 20 MIN: CPT | Performed by: NURSE PRACTITIONER

## 2023-04-27 RX ORDER — ALFUZOSIN HYDROCHLORIDE 10 MG/1
10 TABLET, EXTENDED RELEASE ORAL DAILY
COMMUNITY

## 2023-04-27 NOTE — PROGRESS NOTES
CVTS Office Progress Note     4/27/2023    David Chacon  1940    Chief Complaint:    Chief Complaint   Patient presents with   • Carotid Artery Disease       HPI:      PCP:  Yehuda Victor MD  Cardiology:  Dr. Blevins     82 y.o. male with HTN(stable, increased risk stroke, rupture), Hyperlipidemia(stable, increased risk cardiovascular events), Diabetes Mellitus(stable, increased risk cardiovascular events) and Chronic Kidney Disease(stable, increased risk renal failure) carotid stenosis, left retinal artery embolization 2017.  former smoker and quit 1985.  Former patient of Dr. Hamman, established for surveillance of carotid stenosis.  SH includes pericardectomy 1972 for pericarditis, has sternotomy incision.  Retinal artery embolization 2017, echo negative for embolic source, carotid ultrasound with mild plaque burden, CTA carotid not obtained at that time due to borderline GFR.  Still has partial vision loss of left eye but did improve some on DAPT, Statin.  He returns today in follow up.  No new issues since previous visit returns today with carotid ultrasound.  No other associated signs, symptoms or modifying factors.    4/2017 US Aorta: negative for AAA    4/2017 TTE: EF 55%, grade I DD, mild TR    4/2017 Carotid Duplex: ROSALIO 0-49% mPSV 104c/s Ratio , LICA 50-69%% mPSV 151c/s Ratio, Antegrade vertebrals  12/2017 Carotid Duplex: ROSALIO 0-49% mPSV 119c/s Ratio 2.2, LICA 0-49% mPSV 115c/s Ratio 2.0, Antegrade vertebrals  1/2019 Carotid Duplex: ROSALIO 0-49% mPSV 100c/s Ratio 1.3, LICA 50-69% mPSV 130c/s Ratio 1.9, Antegrade vertebrals  10/2021 Carotid Duplex: ROSALIO 50-69% mPSV 139c/s Ratio 1.7, LICA 50-69% mPSV 184c/s Ratio 1.9, Antegrade vertebrals  4/2022 Carotid Duplex: ROSALIO 0-49% mPSV 88c/s Ratio 1.9, LICA 0-49% mPSV 106c/s Ratio 2.3, Antegrade vertebrals  4/2023 Carotid Duplex: ROSALIO 0-49% mPSV 97c/s mEDV 21c/s Ratio 1.3, LICA 0-49% mPSV 105c/s mEDV 18c/s Ratio 1.5, Antegrade  vertebrals      The following portions of the patient's history were reviewed and updated as appropriate: allergies, current medications, past family history, past medical history, past social history, past surgical history and problem list.  Recent images independently reviewed.  Available laboratory values reviewed.    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    • Epiretinal membrane      OS, s/p PPV, doing well      • Exotropia     - intermittent, stable      • Glaucoma suspect    • Hyperlipidemia    • Hypertension    • Nonproliferative diabetic retinopathy     MILD   • Nuclear cataract    • Type 2 diabetes mellitus 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     EDEMA     Past Surgical History:   Procedure Laterality Date   • CARDIAC CATHETERIZATION     • CATARACT EXTRACTION EXTRACAPSULAR W/ INTRAOCULAR LENS IMPLANTATION     • LAMINOTOMY Left 01/06/1969    Exc HNP 5th lumbar   • OTHER SURGICAL HISTORY  05/31/2016    EXTENDED VISUAL FIELDS STUDY 51970 (Open angle with borderline findings, low risk, unspecified eye   • OTHER SURGICAL HISTORY  04/26/2016    FINDINGS OF DIL MAC OR FUND EXAM COMM TO MD 5010F (Type 2 diabetes mellitus 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   • OTHER SURGICAL HISTORY  04/26/2016    OCT DISC NFL 44521 (Type 2 diabetes mellitus 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)    • OTHER SURGICAL HISTORY  09/30/2015    OCT SCAN RETINA 45841 (Epiretinal membrane   • PERICARDIECTOMY  08/04/1972    Pericarditis etiol undetermined     Family History   Problem Relation Age of Onset   • Heart disease Mother         MI   • Heart disease Father         MI     Social History     Tobacco Use   • Smoking status:  Former     Passive exposure: Past   • Smokeless tobacco: Never   Substance Use Topics   • Alcohol use: No   • Drug use: No       ALLERGIES:  Allergies   Allergen Reactions   • Olmesartan Other (See Comments)     Patient had cough and dry skin to generic olmsartan. He requires branded Benicar   • Lisinopril Cough     cough   • Hydrocodone Cough         MEDICATIONS:    Current Outpatient Medications:   •  albuterol sulfate  (90 Base) MCG/ACT inhaler, Inhale 2 puffs Every 4 (Four) Hours As Needed for Wheezing., Disp: , Rfl:   •  alfuzosin (UROXATRAL) 10 MG 24 hr tablet, Take 1 tablet by mouth Daily., Disp: , Rfl:   •  aspirin 81 MG tablet, Take 1 tablet by mouth Daily., Disp: 30 tablet, Rfl: 11  •  atorvastatin (LIPITOR) 20 MG tablet, Take 1 tablet by mouth Daily., Disp: , Rfl:   •  budesonide-formoterol (SYMBICORT) 160-4.5 MCG/ACT inhaler, Inhale 2 puffs 2 (Two) Times a Day., Disp: , Rfl:   •  calcitriol (ROCALTROL) 0.25 MCG capsule, Take 1 capsule by mouth Daily., Disp: , Rfl:   •  chlorhexidine (PERIDEX) 0.12 % solution, Apply 15 mL to the mouth or throat 2 (Two) Times a Day., Disp: , Rfl:   •  Cholecalciferol (Vitamin D3) 10 MCG (400 UNIT) capsule, Take  by mouth Daily., Disp: , Rfl:   •  Cinnamon 500 MG tablet, Take 1,000 mg by mouth 2 (Two) Times a Day., Disp: , Rfl:   •  clopidogrel (PLAVIX) 75 MG tablet, Take 1 tablet by mouth Daily., Disp: 30 tablet, Rfl: 11  •  Cyanocobalamin 1000 MCG/ML kit, Inject  as directed Every 30 (Thirty) Days., Disp: , Rfl:   •  FLUoxetine (PROzac) 20 MG capsule, Take 1 capsule by mouth Daily., Disp: , Rfl:   •  folic acid (FOLVITE) 1 MG tablet, Take 1 tablet by mouth Daily., Disp: , Rfl:   •  glimepiride (AMARYL) 4 MG tablet, Take 1 tablet by mouth Every Morning Before Breakfast., Disp: , Rfl:   •  irbesartan (AVAPRO) 75 MG tablet, Take 1 tablet by mouth Every Night., Disp: , Rfl:   •  latanoprost (XALATAN) 0.005 % ophthalmic solution, ADMINISTER 1 DROP TO BOTH EYES EVERY  NIGHT. (Patient taking differently: Administer 1 drop to both eyes.), Disp: 2.5 mL, Rfl: 12  •  Omega-3 Fatty Acids (FISH OIL) 1000 MG capsule capsule, Take  by mouth Daily With Breakfast., Disp: , Rfl:   •  sitaGLIPtin (JANUVIA) 100 MG tablet, Take 1 tablet by mouth Daily., Disp: , Rfl:   •  timolol (TIMOPTIC) 0.5 % ophthalmic solution, Administer 1 drop to both eyes Take As Directed., Disp: , Rfl:   •  vitamin C (ASCORBIC ACID) 250 MG tablet, Take 2 tablets by mouth Daily., Disp: , Rfl:   •  Zinc 50 MG capsule, Take 50 mg by mouth Daily., Disp: , Rfl:       Review of Systems   Constitutional: Negative for chills, decreased appetite and fever.   HENT: Negative for congestion, nosebleeds and sore throat.    Eyes: Negative for blurred vision, visual disturbance and visual halos.   Cardiovascular: Positive for dyspnea on exertion. Negative for chest pain and leg swelling.   Respiratory: Negative for cough, shortness of breath, sputum production and wheezing.    Endocrine: Negative for cold intolerance and polyuria.   Hematologic/Lymphatic: Negative for bleeding problem. Does not bruise/bleed easily.   Skin: Positive for unusual hair distribution. Negative for flushing and nail changes.   Musculoskeletal: Positive for arthritis, back pain and joint pain.   Gastrointestinal: Negative for bloating, abdominal pain, hematemesis, melena, nausea and vomiting.   Genitourinary: Negative for flank pain and hematuria.   Neurological: Negative for brief paralysis, difficulty with concentration, focal weakness, light-headedness, loss of balance, numbness, paresthesias and weakness.        Occasional difficulty with memory   Psychiatric/Behavioral: Negative for altered mental status, depression, substance abuse and suicidal ideas.   Allergic/Immunologic: Negative for hives and persistent infections.         Vitals:    04/27/23 1019   BP: 132/77   BP Location: Left arm   Pulse: 60   SpO2: 99%   Weight: 78 kg (172 lb)   Height: 170.2  "cm (67\")     Physical Exam  Vitals and nursing note reviewed.   Constitutional:       Appearance: Normal appearance. He is not ill-appearing.   HENT:      Head: Normocephalic.      Nose: Nose normal.      Mouth/Throat:      Mouth: Mucous membranes are moist.   Eyes:      Extraocular Movements: Extraocular movements intact.      Pupils: Pupils are equal, round, and reactive to light.   Cardiovascular:      Rate and Rhythm: Normal rate.      Pulses: Normal pulses.   Pulmonary:      Effort: Pulmonary effort is normal.   Abdominal:      General: Abdomen is flat.      Palpations: Abdomen is soft.   Musculoskeletal:         General: Normal range of motion.      Cervical back: Normal range of motion.   Skin:     General: Skin is warm and dry.      Capillary Refill: Capillary refill takes 2 to 3 seconds.   Neurological:      General: No focal deficit present.      Mental Status: He is alert and oriented to person, place, and time. Mental status is at baseline.      Sensory: Sensory deficit (left eye, chornic) present.   Psychiatric:         Mood and Affect: Mood normal.         Behavior: Behavior normal.         Assessment & Plan     Independent Review of Studies    1. Carotid stenosis, asymptomatic, bilateral  Mild bilateral ICA disease.  Velocities lower than prior exam.   Asymptomatic    No surgical intervention indicated at this time    DAPT, Statin    Repeat duplex in 2 years    - Duplex Carotid Ultrasound CAR; Future    2. Mixed hyperlipidemia  Lipid-lowering therapy has been proven beneficial in patients with cardio-vascular disease. Current guidelines recommend statin treatment for all patients with PAD,CAD and carotid stenosis. Statins are beneficial in preventing cardiovascular events, increasing functional capacity and lower the risk of adverse limb loss in PAD. Statins decrease the progression of plaque formation and may improve peripheral vessel lining, and aid in reversing atherosclerosis.    Lab Results "   Component Value Date    CHOL 199 11/07/2020    CHLPL 153 03/04/2022    TRIG 172 (H) 03/04/2022    HDL 39 03/04/2022    LDL 80 03/04/2022         Detailed discussion regarding risks, benefits, and treatment plan. Images independently reviewed. Patient understands, agrees, and wishes to proceed with plan.       This document has been electronically signed by ROHIT [email or web address] @  On April 27, 2023 15:01 CDT

## 2023-04-27 NOTE — PATIENT INSTRUCTIONS
The results of your carotid ultrasound shows mild disease of the right carotid and is stable from previous exam, ultrasound of the left carotid shows mild disease and is stable from previous exam.    Follow up in 2 years     If you should experience any neurological symptoms including but not limited to visual or speech disturbances confusion, seizures, or weakness of limbs of one side of your body notify [facility] immediately for evaluation or if after hours present to the nearest Emergency Department.

## 2023-08-31 ENCOUNTER — APPOINTMENT (OUTPATIENT)
Dept: ULTRASOUND IMAGING | Facility: HOSPITAL | Age: 83
End: 2023-08-31
Payer: MEDICARE

## 2023-08-31 ENCOUNTER — APPOINTMENT (OUTPATIENT)
Dept: CT IMAGING | Facility: HOSPITAL | Age: 83
End: 2023-08-31
Payer: MEDICARE

## 2023-08-31 ENCOUNTER — HOSPITAL ENCOUNTER (OUTPATIENT)
Facility: HOSPITAL | Age: 83
Setting detail: OBSERVATION
Discharge: HOME HEALTH | End: 2023-09-02
Attending: STUDENT IN AN ORGANIZED HEALTH CARE EDUCATION/TRAINING PROGRAM
Payer: COMMERCIAL

## 2023-08-31 DIAGNOSIS — R00.1 SINUS BRADYCARDIA BY ELECTROCARDIOGRAM: ICD-10-CM

## 2023-08-31 DIAGNOSIS — R55 SYNCOPE AND COLLAPSE: Primary | ICD-10-CM

## 2023-08-31 LAB
ALBUMIN SERPL-MCNC: 4.1 G/DL (ref 3.5–5.2)
ALBUMIN/GLOB SERPL: 1.3 G/DL
ALP SERPL-CCNC: 87 U/L (ref 39–117)
ALT SERPL W P-5'-P-CCNC: 18 U/L (ref 1–41)
ANION GAP SERPL CALCULATED.3IONS-SCNC: 14 MMOL/L (ref 5–15)
AST SERPL-CCNC: 17 U/L (ref 1–40)
BASOPHILS # BLD AUTO: 0.01 10*3/MM3 (ref 0–0.2)
BASOPHILS NFR BLD AUTO: 0.1 % (ref 0–1.5)
BILIRUB SERPL-MCNC: 0.4 MG/DL (ref 0–1.2)
BILIRUB UR QL STRIP: NEGATIVE
BUN SERPL-MCNC: 45 MG/DL (ref 8–23)
BUN/CREAT SERPL: 24.7 (ref 7–25)
CALCIUM SPEC-SCNC: 9.2 MG/DL (ref 8.6–10.5)
CHLORIDE SERPL-SCNC: 102 MMOL/L (ref 98–107)
CLARITY UR: CLEAR
CO2 SERPL-SCNC: 20 MMOL/L (ref 22–29)
COLOR UR: YELLOW
CREAT SERPL-MCNC: 1.82 MG/DL (ref 0.76–1.27)
D-DIMER, QUANTITATIVE (MAD,POW, STR): 290 NG/ML (FEU) (ref 0–810)
DEPRECATED RDW RBC AUTO: 42.5 FL (ref 37–54)
EGFRCR SERPLBLD CKD-EPI 2021: 36.9 ML/MIN/1.73
EOSINOPHIL # BLD AUTO: 0.16 10*3/MM3 (ref 0–0.4)
EOSINOPHIL NFR BLD AUTO: 2.3 % (ref 0.3–6.2)
ERYTHROCYTE [DISTWIDTH] IN BLOOD BY AUTOMATED COUNT: 12.4 % (ref 12.3–15.4)
GLOBULIN UR ELPH-MCNC: 3.2 GM/DL
GLUCOSE BLDC GLUCOMTR-MCNC: 218 MG/DL (ref 70–130)
GLUCOSE SERPL-MCNC: 189 MG/DL (ref 65–99)
GLUCOSE UR STRIP-MCNC: NEGATIVE MG/DL
HCT VFR BLD AUTO: 38.4 % (ref 37.5–51)
HGB BLD-MCNC: 12.7 G/DL (ref 13–17.7)
HGB UR QL STRIP.AUTO: NEGATIVE
HOLD SPECIMEN: NORMAL
HOLD SPECIMEN: NORMAL
IMM GRANULOCYTES # BLD AUTO: 0.01 10*3/MM3 (ref 0–0.05)
IMM GRANULOCYTES NFR BLD AUTO: 0.1 % (ref 0–0.5)
KETONES UR QL STRIP: NEGATIVE
LEUKOCYTE ESTERASE UR QL STRIP.AUTO: NEGATIVE
LYMPHOCYTES # BLD AUTO: 1.77 10*3/MM3 (ref 0.7–3.1)
LYMPHOCYTES NFR BLD AUTO: 25.5 % (ref 19.6–45.3)
MAGNESIUM SERPL-MCNC: 2 MG/DL (ref 1.6–2.4)
MCH RBC QN AUTO: 30.9 PG (ref 26.6–33)
MCHC RBC AUTO-ENTMCNC: 33.1 G/DL (ref 31.5–35.7)
MCV RBC AUTO: 93.4 FL (ref 79–97)
MONOCYTES # BLD AUTO: 0.51 10*3/MM3 (ref 0.1–0.9)
MONOCYTES NFR BLD AUTO: 7.4 % (ref 5–12)
NEUTROPHILS NFR BLD AUTO: 4.47 10*3/MM3 (ref 1.7–7)
NEUTROPHILS NFR BLD AUTO: 64.6 % (ref 42.7–76)
NITRITE UR QL STRIP: NEGATIVE
NRBC BLD AUTO-RTO: 0 /100 WBC (ref 0–0.2)
PH UR STRIP.AUTO: 5.5 [PH] (ref 5–9)
PLATELET # BLD AUTO: 169 10*3/MM3 (ref 140–450)
PMV BLD AUTO: 10.9 FL (ref 6–12)
POTASSIUM SERPL-SCNC: 4.7 MMOL/L (ref 3.5–5.2)
PROT SERPL-MCNC: 7.3 G/DL (ref 6–8.5)
PROT UR QL STRIP: NEGATIVE
QT INTERVAL: 458 MS
QTC INTERVAL: 429 MS
RBC # BLD AUTO: 4.11 10*6/MM3 (ref 4.14–5.8)
SODIUM SERPL-SCNC: 136 MMOL/L (ref 136–145)
SP GR UR STRIP: 1.01 (ref 1–1.03)
TROPONIN T SERPL HS-MCNC: 20 NG/L
UROBILINOGEN UR QL STRIP: NORMAL
WBC NRBC COR # BLD: 6.93 10*3/MM3 (ref 3.4–10.8)
WHOLE BLOOD HOLD COAG: NORMAL
WHOLE BLOOD HOLD SPECIMEN: NORMAL

## 2023-08-31 PROCEDURE — 93010 ELECTROCARDIOGRAM REPORT: CPT | Performed by: INTERNAL MEDICINE

## 2023-08-31 PROCEDURE — G0378 HOSPITAL OBSERVATION PER HR: HCPCS

## 2023-08-31 PROCEDURE — 80053 COMPREHEN METABOLIC PANEL: CPT | Performed by: STUDENT IN AN ORGANIZED HEALTH CARE EDUCATION/TRAINING PROGRAM

## 2023-08-31 PROCEDURE — 96372 THER/PROPH/DIAG INJ SC/IM: CPT

## 2023-08-31 PROCEDURE — 85379 FIBRIN DEGRADATION QUANT: CPT | Performed by: STUDENT IN AN ORGANIZED HEALTH CARE EDUCATION/TRAINING PROGRAM

## 2023-08-31 PROCEDURE — 85025 COMPLETE CBC W/AUTO DIFF WBC: CPT | Performed by: STUDENT IN AN ORGANIZED HEALTH CARE EDUCATION/TRAINING PROGRAM

## 2023-08-31 PROCEDURE — 70450 CT HEAD/BRAIN W/O DYE: CPT

## 2023-08-31 PROCEDURE — 25010000002 ATROPINE SULFATE

## 2023-08-31 PROCEDURE — 84484 ASSAY OF TROPONIN QUANT: CPT | Performed by: STUDENT IN AN ORGANIZED HEALTH CARE EDUCATION/TRAINING PROGRAM

## 2023-08-31 PROCEDURE — 25010000002 HEPARIN (PORCINE) PER 1000 UNITS

## 2023-08-31 PROCEDURE — 99284 EMERGENCY DEPT VISIT MOD MDM: CPT

## 2023-08-31 PROCEDURE — 93005 ELECTROCARDIOGRAM TRACING: CPT | Performed by: STUDENT IN AN ORGANIZED HEALTH CARE EDUCATION/TRAINING PROGRAM

## 2023-08-31 PROCEDURE — 96361 HYDRATE IV INFUSION ADD-ON: CPT

## 2023-08-31 PROCEDURE — 96374 THER/PROPH/DIAG INJ IV PUSH: CPT

## 2023-08-31 PROCEDURE — 93880 EXTRACRANIAL BILAT STUDY: CPT

## 2023-08-31 PROCEDURE — 82948 REAGENT STRIP/BLOOD GLUCOSE: CPT

## 2023-08-31 PROCEDURE — 81003 URINALYSIS AUTO W/O SCOPE: CPT | Performed by: STUDENT IN AN ORGANIZED HEALTH CARE EDUCATION/TRAINING PROGRAM

## 2023-08-31 PROCEDURE — 83735 ASSAY OF MAGNESIUM: CPT | Performed by: STUDENT IN AN ORGANIZED HEALTH CARE EDUCATION/TRAINING PROGRAM

## 2023-08-31 PROCEDURE — 84443 ASSAY THYROID STIM HORMONE: CPT

## 2023-08-31 RX ORDER — SODIUM CHLORIDE 9 MG/ML
40 INJECTION, SOLUTION INTRAVENOUS AS NEEDED
Status: DISCONTINUED | OUTPATIENT
Start: 2023-08-31 | End: 2023-09-02 | Stop reason: HOSPADM

## 2023-08-31 RX ORDER — POLYETHYLENE GLYCOL 3350 17 G/17G
17 POWDER, FOR SOLUTION ORAL DAILY PRN
Status: DISCONTINUED | OUTPATIENT
Start: 2023-08-31 | End: 2023-09-02 | Stop reason: HOSPADM

## 2023-08-31 RX ORDER — SODIUM CHLORIDE 0.9 % (FLUSH) 0.9 %
10 SYRINGE (ML) INJECTION AS NEEDED
Status: DISCONTINUED | OUTPATIENT
Start: 2023-08-31 | End: 2023-09-02 | Stop reason: HOSPADM

## 2023-08-31 RX ORDER — BUDESONIDE AND FORMOTEROL FUMARATE DIHYDRATE 160; 4.5 UG/1; UG/1
2 AEROSOL RESPIRATORY (INHALATION)
Status: DISCONTINUED | OUTPATIENT
Start: 2023-08-31 | End: 2023-09-02 | Stop reason: HOSPADM

## 2023-08-31 RX ORDER — ALBUTEROL SULFATE 90 UG/1
2 AEROSOL, METERED RESPIRATORY (INHALATION) EVERY 4 HOURS PRN
Status: DISCONTINUED | OUTPATIENT
Start: 2023-08-31 | End: 2023-08-31

## 2023-08-31 RX ORDER — CHLORHEXIDINE GLUCONATE 0.12 MG/ML
15 RINSE ORAL EVERY 12 HOURS SCHEDULED
Status: DISCONTINUED | OUTPATIENT
Start: 2023-08-31 | End: 2023-09-02 | Stop reason: HOSPADM

## 2023-08-31 RX ORDER — FLUOXETINE HYDROCHLORIDE 20 MG/1
20 CAPSULE ORAL DAILY
Status: DISCONTINUED | OUTPATIENT
Start: 2023-08-31 | End: 2023-09-02 | Stop reason: HOSPADM

## 2023-08-31 RX ORDER — LOSARTAN POTASSIUM 25 MG/1
25 TABLET ORAL
Status: DISCONTINUED | OUTPATIENT
Start: 2023-08-31 | End: 2023-09-01

## 2023-08-31 RX ORDER — ONDANSETRON 2 MG/ML
4 INJECTION INTRAMUSCULAR; INTRAVENOUS EVERY 6 HOURS PRN
Status: DISCONTINUED | OUTPATIENT
Start: 2023-08-31 | End: 2023-09-02 | Stop reason: HOSPADM

## 2023-08-31 RX ORDER — FOLIC ACID 1 MG/1
1 TABLET ORAL DAILY
Status: DISCONTINUED | OUTPATIENT
Start: 2023-08-31 | End: 2023-09-02 | Stop reason: HOSPADM

## 2023-08-31 RX ORDER — BISACODYL 10 MG
10 SUPPOSITORY, RECTAL RECTAL DAILY PRN
Status: DISCONTINUED | OUTPATIENT
Start: 2023-08-31 | End: 2023-09-02 | Stop reason: HOSPADM

## 2023-08-31 RX ORDER — SODIUM CHLORIDE 0.9 % (FLUSH) 0.9 %
10 SYRINGE (ML) INJECTION EVERY 12 HOURS SCHEDULED
Status: DISCONTINUED | OUTPATIENT
Start: 2023-08-31 | End: 2023-09-02 | Stop reason: HOSPADM

## 2023-08-31 RX ORDER — GLIPIZIDE 5 MG/1
2.5 TABLET ORAL
Status: DISCONTINUED | OUTPATIENT
Start: 2023-09-01 | End: 2023-09-02 | Stop reason: HOSPADM

## 2023-08-31 RX ORDER — ONDANSETRON 4 MG/1
4 TABLET, FILM COATED ORAL EVERY 6 HOURS PRN
Status: DISCONTINUED | OUTPATIENT
Start: 2023-08-31 | End: 2023-09-02 | Stop reason: HOSPADM

## 2023-08-31 RX ORDER — BISACODYL 5 MG/1
5 TABLET, DELAYED RELEASE ORAL DAILY PRN
Status: DISCONTINUED | OUTPATIENT
Start: 2023-08-31 | End: 2023-09-02 | Stop reason: HOSPADM

## 2023-08-31 RX ORDER — CALCITRIOL 0.25 UG/1
0.25 CAPSULE, LIQUID FILLED ORAL DAILY
Status: DISCONTINUED | OUTPATIENT
Start: 2023-08-31 | End: 2023-09-02 | Stop reason: HOSPADM

## 2023-08-31 RX ORDER — AMOXICILLIN 250 MG
2 CAPSULE ORAL 2 TIMES DAILY
Status: DISCONTINUED | OUTPATIENT
Start: 2023-08-31 | End: 2023-09-02 | Stop reason: HOSPADM

## 2023-08-31 RX ORDER — CLOPIDOGREL BISULFATE 75 MG/1
75 TABLET ORAL DAILY
Status: DISCONTINUED | OUTPATIENT
Start: 2023-08-31 | End: 2023-09-02 | Stop reason: HOSPADM

## 2023-08-31 RX ORDER — LATANOPROST 50 UG/ML
1 SOLUTION/ DROPS OPHTHALMIC NIGHTLY
Status: DISCONTINUED | OUTPATIENT
Start: 2023-08-31 | End: 2023-09-02 | Stop reason: HOSPADM

## 2023-08-31 RX ORDER — TAMSULOSIN HYDROCHLORIDE 0.4 MG/1
0.4 CAPSULE ORAL NIGHTLY
Status: DISCONTINUED | OUTPATIENT
Start: 2023-08-31 | End: 2023-08-31

## 2023-08-31 RX ORDER — ALBUTEROL SULFATE 2.5 MG/3ML
2.5 SOLUTION RESPIRATORY (INHALATION) EVERY 4 HOURS PRN
Status: DISCONTINUED | OUTPATIENT
Start: 2023-08-31 | End: 2023-09-02 | Stop reason: HOSPADM

## 2023-08-31 RX ORDER — ASPIRIN 81 MG/1
81 TABLET ORAL DAILY
Status: DISCONTINUED | OUTPATIENT
Start: 2023-09-01 | End: 2023-09-02 | Stop reason: HOSPADM

## 2023-08-31 RX ORDER — ATORVASTATIN CALCIUM 20 MG/1
20 TABLET, FILM COATED ORAL DAILY
Status: DISCONTINUED | OUTPATIENT
Start: 2023-09-01 | End: 2023-09-02 | Stop reason: HOSPADM

## 2023-08-31 RX ORDER — TIMOLOL MALEATE 5 MG/ML
1 SOLUTION/ DROPS OPHTHALMIC 2 TIMES DAILY
Status: DISCONTINUED | OUTPATIENT
Start: 2023-08-31 | End: 2023-09-02 | Stop reason: HOSPADM

## 2023-08-31 RX ORDER — SODIUM CHLORIDE 9 MG/ML
100 INJECTION, SOLUTION INTRAVENOUS CONTINUOUS
Status: DISCONTINUED | OUTPATIENT
Start: 2023-08-31 | End: 2023-09-01

## 2023-08-31 RX ORDER — HEPARIN SODIUM 5000 [USP'U]/ML
5000 INJECTION, SOLUTION INTRAVENOUS; SUBCUTANEOUS EVERY 12 HOURS SCHEDULED
Status: DISCONTINUED | OUTPATIENT
Start: 2023-08-31 | End: 2023-09-02 | Stop reason: HOSPADM

## 2023-08-31 RX ADMIN — LINAGLIPTIN 5 MG: 5 TABLET, FILM COATED ORAL at 21:15

## 2023-08-31 RX ADMIN — LATANOPROST 1 DROP: 50 SOLUTION OPHTHALMIC at 21:37

## 2023-08-31 RX ADMIN — ATROPINE SULFATE 1 MG: 0.1 INJECTION INTRAVENOUS at 22:43

## 2023-08-31 RX ADMIN — LOSARTAN POTASSIUM 25 MG: 25 TABLET, FILM COATED ORAL at 21:16

## 2023-08-31 RX ADMIN — HEPARIN SODIUM 5000 UNITS: 5000 INJECTION INTRAVENOUS; SUBCUTANEOUS at 21:14

## 2023-08-31 RX ADMIN — TIMOLOL MALEATE 1 DROP: 5 SOLUTION OPHTHALMIC at 21:35

## 2023-08-31 RX ADMIN — CHLORHEXIDINE GLUCONATE 15 ML: 1.2 SOLUTION ORAL at 21:14

## 2023-08-31 RX ADMIN — Medication 10 ML: at 21:21

## 2023-08-31 RX ADMIN — SODIUM CHLORIDE 100 ML/HR: 9 INJECTION, SOLUTION INTRAVENOUS at 18:24

## 2023-08-31 NOTE — ED NOTES
"Nursing report ED to floor  David Chacon  82 y.o.  male    HPI:   Chief Complaint   Patient presents with    Syncope       Admitting doctor:   Peter Kim MD    Consulting provider(s):  Consults       No orders found from 8/2/2023 to 9/1/2023.             Admitting diagnosis:   The primary encounter diagnosis was Syncope and collapse. A diagnosis of Sinus bradycardia by electrocardiogram was also pertinent to this visit.    Code status:   Current Code Status       Date Active Code Status Order ID Comments User Context       Prior            Allergies:   Olmesartan, Lisinopril, and Hydrocodone    Intake and Output  No intake or output data in the 24 hours ending 08/31/23 1638    Weight:       08/31/23  1500   Weight: 77.1 kg (170 lb)       Most recent vitals:   Vitals:    08/31/23 1459 08/31/23 1500   BP: 159/66 159/66   BP Location: Right arm Right arm   Patient Position: Sitting Sitting   Pulse: 54 54   Resp: 20 20   Temp: 97.3 °F (36.3 °C) 97.3 °F (36.3 °C)   TempSrc: Oral Oral   SpO2:  98%   Weight:  77.1 kg (170 lb)   Height:  180.3 cm (71\")     Oxygen Therapy: RA    Active LDAs/IV Access:   Lines, Drains & Airways       Active LDAs       Name Placement date Placement time Site Days    Peripheral IV 08/31/23 1524 Right Antecubital 08/31/23  1524  Antecubital  less than 1                    Labs (abnormal labs have a star):   Labs Reviewed   COMPREHENSIVE METABOLIC PANEL - Abnormal; Notable for the following components:       Result Value    Glucose 189 (*)     BUN 45 (*)     Creatinine 1.82 (*)     CO2 20.0 (*)     eGFR 36.9 (*)     All other components within normal limits    Narrative:     GFR Normal >60  Chronic Kidney Disease <60  Kidney Failure <15    The GFR formula is only valid for adults with stable renal function between ages 18 and 70.   SINGLE HSTROPONIN T - Abnormal; Notable for the following components:    HS Troponin T 20 (*)     All other components within normal limits    Narrative:  " "   High Sensitive Troponin T Reference Range:  <10.0 ng/L- Negative Female for AMI  <15.0 ng/L- Negative Male for AMI  >=10 - Abnormal Female indicating possible myocardial injury.  >=15 - Abnormal Male indicating possible myocardial injury.   Clinicians would have to utilize clinical acumen, EKG, Troponin, and serial changes to determine if it is an Acute Myocardial Infarction or myocardial injury due to an underlying chronic condition.        CBC WITH AUTO DIFFERENTIAL - Abnormal; Notable for the following components:    RBC 4.11 (*)     Hemoglobin 12.7 (*)     All other components within normal limits   MAGNESIUM - Normal   URINALYSIS W/ CULTURE IF INDICATED - Normal    Narrative:     In absence of clinical symptoms, the presence of pyuria, bacteria, and/or nitrites on the urinalysis result does not correlate with infection.  Urine microscopic not indicated.   D-DIMER, QUANTITATIVE - Normal    Narrative:     According to the assay 's published package insert, a normal (<500 ng/mL (FEU)) D-dimer result in conjunction with a non-high clinical probability assessment, excludes deep vein thrombosis (DVT) and pulmonary embolism (PE) with high sensitivity.    D-dimer values increase with age and this can make VTE exclusion of an older population difficult. To address this, the [employer], based on best available evidence and recent guidelines, recommends that clinicians use age-adjusted D-dimer thresholds in patients greater than 50 years of age with: a) a low probability of PE who do not meet all Pulmonary Embolism Rule Out Criteria, or b) in those with intermediate probability of PE.   The formula for an age-adjusted D-dimer cut-off is \"age*10\".  For example, a 60 year old patient would have an age-adjusted cut-off of 600 ng/mL (FEU) and an 80 year old 800 ng/mL (FEU).     RAINBOW DRAW    Narrative:     The following orders were created for panel order [facility] Draw.  Procedure           "                     Abnormality         Status                     ---------                               -----------         ------                     Green Top (Gel)[[id number]]                                  Final result               Lavender Top[[id number]]                                     Final result               Gold Top - SST[[id number]]                                   Final result               Light Blue Top[[id number]]                                   Final result                 Please view results for these tests on the individual orders.   CBC AND DIFFERENTIAL    Narrative:     The following orders were created for panel order CBC & Differential.  Procedure                               Abnormality         Status                     ---------                               -----------         ------                     CBC Auto Differential[[id number]]        Abnormal            Final result                 Please view results for these tests on the individual orders.   GREEN TOP   LAVENDER TOP   GOLD TOP - SST   LIGHT BLUE TOP       Meds given in ED:   Medications   sodium chloride 0.9 % flush 10 mL (has no administration in time range)           NIH Stroke Scale:       Isolation/Infection(s):  No active isolations   No active infections     COVID Testing  Collected NA  Resulted NA    Nursing report ED to floor:  Mental status: A&O x4  Ambulatory status: Selfer  Precautions: None    ED nurse phone extentsion- [phone]

## 2023-08-31 NOTE — ED PROVIDER NOTES
Subjective   History of Present Illness  Patient is a 82-year-old male with a history of carotid stenosis, hypertension, hyperlipidemia, type 2 diabetes, CKD who presented to the emergency room due to syncopal episode.  Patient reports has had multiple near syncopal episode last few weeks but today he passed out while sitting talking to his wife.  Patient denies any SOB, chest pain, dizziness or nausea or vomiting.  Patient noted he was recently started on a medication by urology but he does not remember the name of the medicine.      Review of Systems   Constitutional:  Negative for chills and fever.   HENT:  Negative for congestion, rhinorrhea and sore throat.    Eyes:  Negative for visual disturbance.   Respiratory:  Negative for cough, chest tightness and shortness of breath.    Cardiovascular:  Negative for chest pain, palpitations and leg swelling.   Gastrointestinal:  Negative for constipation, nausea and vomiting.   Endocrine: Negative for polydipsia and polyuria.   Genitourinary:  Negative for difficulty urinating, dysuria, frequency and urgency.   Musculoskeletal:  Negative for joint swelling and myalgias.   Skin:  Negative for rash and wound.   Neurological:  Positive for syncope. Negative for dizziness, weakness, light-headedness and headaches.   Psychiatric/Behavioral:  Negative for dysphoric mood and sleep disturbance.      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 He requires branded Benicar    Lisinopril Cough     cough    Hydrocodone Cough       Past Surgical History:   Procedure Laterality Date    CARDIAC CATHETERIZATION      CATARACT EXTRACTION EXTRACAPSULAR W/ INTRAOCULAR LENS IMPLANTATION      LAMINOTOMY Left 01/06/1969    Exc HNP 5th lumbar    OTHER SURGICAL HISTORY  05/31/2016    EXTENDED VISUAL FIELDS STUDY 77916 (Open angle with borderline findings, low risk, unspecified eye    OTHER SURGICAL HISTORY  04/26/2016    FINDINGS OF DIL MAC OR FUND EXAM COMM TO MD [facility] (Type 2 diabetes mellitus 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    OTHER SURGICAL HISTORY  04/26/2016    OCT DISC NFL 73532 (Type 2 diabetes mellitus 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)     OTHER SURGICAL HISTORY  09/30/2015    OCT SCAN RETINA 92645 (Epiretinal membrane    PERICARDIECTOMY  08/04/1972    Pericarditis etiol undetermined       Family History   Problem Relation Age of Onset    Heart disease Mother         MI    Heart disease Father         MI       Social History     Socioeconomic History    Marital status:    Tobacco Use    Smoking status: Former     Passive exposure: Past    Smokeless tobacco: Never   Substance and Sexual Activity    Alcohol use: No    Drug use: No    Sexual activity: Defer           Objective   Physical Exam  Vitals and nursing note reviewed.   Constitutional:       General: He is not in acute distress.     Appearance: Normal appearance. He is not diaphoretic.   HENT:      Head: Normocephalic and atraumatic.      Right Ear: External ear normal.      Left Ear: External ear normal.   Eyes:      General: No scleral icterus.     Extraocular Movements: Extraocular movements intact.      Conjunctiva/sclera: Conjunctivae normal.   Cardiovascular:      Rate and Rhythm: Normal rate  and regular rhythm.      Heart sounds: Normal heart sounds.   Pulmonary:      Effort: Pulmonary effort is normal. No respiratory distress.      Breath sounds: Normal breath sounds.   Chest:      Chest wall: No tenderness.   Abdominal:      General: Bowel sounds are normal.      Palpations: Abdomen is soft.      Tenderness: There is no abdominal tenderness.   Musculoskeletal:         General: No swelling or tenderness.      Cervical back: Normal range of motion and neck supple.      Right lower leg: No edema.      Left lower leg: No edema.   Skin:     General: Skin is warm and dry.      Capillary Refill: Capillary refill takes less than 2 seconds.      Findings: No erythema or rash.   Neurological:      General: No focal deficit present.      Mental Status: He is alert.      Motor: No weakness.   Psychiatric:         Behavior: Behavior normal.       Procedures           ED Course        Lab Results (last 24 hours)       Procedure Component Value Units Date/Time    CBC & Differential [[id number]]  (Abnormal) Collected: 08/31/23 1517    Specimen: Blood Updated: 08/31/23 1525    Narrative:      The following orders were created for panel order CBC & Differential.  Procedure                               Abnormality         Status                     ---------                               -----------         ------                     CBC Auto Differential[[id number]]        Abnormal            Final result                 Please view results for these tests on the individual orders.    Comprehensive Metabolic Panel [[id number]]  (Abnormal) Collected: 08/31/23 1517    Specimen: Blood Updated: 08/31/23 1549     Glucose 189 mg/dL      BUN 45 mg/dL      Creatinine 1.82 mg/dL      Sodium 136 mmol/L      Potassium 4.7 mmol/L      Chloride 102 mmol/L      CO2 20.0 mmol/L      Calcium 9.2 mg/dL      Total Protein 7.3 g/dL      Albumin 4.1 g/dL      ALT (SGPT) 18 U/L      AST (SGOT) 17 U/L      Alkaline Phosphatase 87 U/L       Total Bilirubin 0.4 mg/dL      Globulin 3.2 gm/dL      A/G Ratio 1.3 g/dL      BUN/Creatinine Ratio 24.7     Anion Gap 14.0 mmol/L      eGFR 36.9 mL/min/1.73     Narrative:      GFR Normal >60  Chronic Kidney Disease <60  Kidney Failure <15    The GFR formula is only valid for adults with stable renal function between ages 18 and 70.    Magnesium [[id number]]  (Normal) Collected: 08/31/23 1517    Specimen: Blood Updated: 08/31/23 1544     Magnesium 2.0 mg/dL     Single High Sensitivity Troponin T [[id number]]  (Abnormal) Collected: 08/31/23 1517    Specimen: Blood Updated: 08/31/23 1544     HS Troponin T 20 ng/L     Narrative:      High Sensitive Troponin T Reference Range:  <10.0 ng/L- Negative Female for AMI  <15.0 ng/L- Negative Male for AMI  >=10 - Abnormal Female indicating possible myocardial injury.  >=15 - Abnormal Male indicating possible myocardial injury.   Clinicians would have to utilize clinical acumen, EKG, Troponin, and serial changes to determine if it is an Acute Myocardial Infarction or myocardial injury due to an underlying chronic condition.         CBC Auto Differential [[id number]]  (Abnormal) Collected: 08/31/23 1517    Specimen: Blood Updated: 08/31/23 1525     WBC 6.93 10*3/mm3      RBC 4.11 10*6/mm3      Hemoglobin 12.7 g/dL      Hematocrit 38.4 %      MCV 93.4 fL      MCH 30.9 pg      MCHC 33.1 g/dL      RDW 12.4 %      RDW-SD 42.5 fl      MPV 10.9 fL      Platelets 169 10*3/mm3      Neutrophil % 64.6 %      Lymphocyte % 25.5 %      Monocyte % 7.4 %      Eosinophil % 2.3 %      Basophil % 0.1 %      Immature Grans % 0.1 %      Neutrophils, Absolute 4.47 10*3/mm3      Lymphocytes, Absolute 1.77 10*3/mm3      Monocytes, Absolute 0.51 10*3/mm3      Eosinophils, Absolute 0.16 10*3/mm3      Basophils, Absolute 0.01 10*3/mm3      Immature Grans, Absolute 0.01 10*3/mm3      nRBC 0.0 /100 WBC     D-dimer, Quantitative [[id number]]  (Normal) Collected: 08/31/23 1517    Specimen: Blood Updated:  "08/31/23 1552     D-Dimer, Quantitative 290 ng/mL (FEU)     Narrative:      According to the assay 's published package insert, a normal (<500 ng/mL (FEU)) D-dimer result in conjunction with a non-high clinical probability assessment, excludes deep vein thrombosis (DVT) and pulmonary embolism (PE) with high sensitivity.    D-dimer values increase with age and this can make VTE exclusion of an older population difficult. To address this, the [employer], based on best available evidence and recent guidelines, recommends that clinicians use age-adjusted D-dimer thresholds in patients greater than 50 years of age with: a) a low probability of PE who do not meet all Pulmonary Embolism Rule Out Criteria, or b) in those with intermediate probability of PE.   The formula for an age-adjusted D-dimer cut-off is \"age*10\".  For example, a 60 year old patient would have an age-adjusted cut-off of 600 ng/mL (FEU) and an 80 year old 800 ng/mL (FEU).      Urinalysis With Culture If Indicated - Urine, Clean Catch [[id number]]  (Normal) Collected: 08/31/23 1522    Specimen: Urine, Clean Catch Updated: 08/31/23 1549     Color, UA Yellow     Appearance, UA Clear     pH, UA 5.5     Specific Gravity, UA 1.015     Glucose, UA Negative     Ketones, UA Negative     Bilirubin, UA Negative     Blood, UA Negative     Protein, UA Negative     Leuk Esterase, UA Negative     Nitrite, UA Negative     Urobilinogen, UA 0.2 E.U./dL    Narrative:      In absence of clinical symptoms, the presence of pyuria, bacteria, and/or nitrites on the urinalysis result does not correlate with infection.  Urine microscopic not indicated.          ECG 12 Lead Syncope   Preliminary Result   Test Reason : Syncope   Blood Pressure :   */*   mmHG   Vent. Rate :  53 BPM     Atrial Rate :  53 BPM      P-R Int : 216 ms          QRS Dur :  98 ms       QT Int : 458 ms       P-R-T Axes :  16   9  57 degrees      QTc Int : 429 ms      Sinus " bradycardia with 1st degree AV block   Otherwise normal ECG   When compared with ECG of 03-JAN-2021 11:14,   Nonspecific T wave abnormality no longer evident in Inferior leads   Nonspecific T wave abnormality no longer evident in Anterior leads      Referred By:            Confirmed By:             LINDEN reviewed by Peter Kim MD, Luis M Lugo MD       Medical Decision Making  Patient is a 82-year-old male with a history of carotid stenosis, hypertension, hyperlipidemia, type 2 diabetes, CKD who presented to the emergency room due to syncopal episode.   Initial EKG shows sinus bradycardia with first-degree AV block.  Heart rate remains in the 50s.  Labs significant with elevated troponin    Lab Results (last 24 hours)     Procedure Component Value Units Date/Time    CBC & Differential [[id number]]  (Abnormal) Collected: 08/31/23 1517    Specimen: Blood Updated: 08/31/23 1525    Narrative:      The following orders were created for panel order CBC & Differential.  Procedure                               Abnormality         Status                       ---------                               -----------         ------                       CBC Auto Differential[[id number]]        Abnormal            Final result                   Please view results for these tests on the individual orders.    Comprehensive Metabolic Panel [[id number]]  (Abnormal) Collected: 08/31/23 1517    Specimen: Blood Updated: 08/31/23 1544     Glucose 189 mg/dL      BUN 45 mg/dL      Creatinine 1.82 mg/dL      Sodium 136 mmol/L      Potassium 4.7 mmol/L      Chloride 102 mmol/L      CO2 20.0 mmol/L      Calcium 9.2 mg/dL      Total Protein 7.3 g/dL      Albumin 4.1 g/dL      ALT (SGPT) 18 U/L      AST (SGOT) 17 U/L      Alkaline Phosphatase 87 U/L      Total Bilirubin 0.4 mg/dL      Globulin 3.2 gm/dL      A/G Ratio 1.3 g/dL      BUN/Creatinine Ratio 24.7     Anion Gap 14.0 mmol/L      eGFR 36.9 mL/min/1.73     Narrative:      GFR  Normal >60  Chronic Kidney Disease <60  Kidney Failure <15    The GFR formula is only valid for adults with stable renal function   between ages 18 and 70.    Magnesium [[id number]]  (Normal) Collected: 08/31/23 1517    Specimen: Blood Updated: 08/31/23 1544     Magnesium 2.0 mg/dL     Single High Sensitivity Troponin T [[id number]]  (Abnormal) Collected:   08/31/23 1517    Specimen: Blood Updated: 08/31/23 1544     HS Troponin T 20 ng/L     Narrative:      High Sensitive Troponin T Reference Range:  <10.0 ng/L- Negative Female for AMI  <15.0 ng/L- Negative Male for AMI  >=10 - Abnormal Female indicating possible myocardial injury.  >=15 - Abnormal Male indicating possible myocardial injury.   Clinicians would have to utilize clinical acumen, EKG, Troponin, and   serial changes to determine if it is an Acute Myocardial Infarction or   myocardial injury due to an underlying chronic condition.         CBC Auto Differential [[id number]]  (Abnormal) Collected: 08/31/23 1517    Specimen: Blood Updated: 08/31/23 1525     WBC 6.93 10*3/mm3      RBC 4.11 10*6/mm3      Hemoglobin 12.7 g/dL      Hematocrit 38.4 %      MCV 93.4 fL      MCH 30.9 pg      MCHC 33.1 g/dL      RDW 12.4 %      RDW-SD 42.5 fl      MPV 10.9 fL      Platelets 169 10*3/mm3      Neutrophil % 64.6 %      Lymphocyte % 25.5 %      Monocyte % 7.4 %      Eosinophil % 2.3 %      Basophil % 0.1 %      Immature Grans % 0.1 %      Neutrophils, Absolute 4.47 10*3/mm3      Lymphocytes, Absolute 1.77 10*3/mm3      Monocytes, Absolute 0.51 10*3/mm3      Eosinophils, Absolute 0.16 10*3/mm3      Basophils, Absolute 0.01 10*3/mm3      Immature Grans, Absolute 0.01 10*3/mm3      nRBC 0.0 /100 WBC     D-dimer, Quantitative [[id number]]  (Normal) Collected: 08/31/23 1517    Specimen: Blood Updated: 08/31/23 1552     D-Dimer, Quantitative 290 ng/mL (FEU)     Narrative:      According to the assay 's published package insert, a normal   (<500 ng/mL (FEU)) D-dimer  "result in conjunction with a non-high clinical   probability assessment, excludes deep vein thrombosis (DVT) and pulmonary   embolism (PE) with high sensitivity.    D-dimer values increase with age and this can make VTE exclusion of an   older population difficult. To address this, the [employer]   [employer], based on best available evidence and recent guidelines,   recommends that clinicians use age-adjusted D-dimer thresholds in patients   greater than 50 years of age with: a) a low probability of PE who do not   meet all Pulmonary Embolism Rule Out Criteria, or b) in those with   intermediate probability of PE.   The formula for an age-adjusted D-dimer cut-off is \"age*10\".  For example, a 60 year old patient would have an age-adjusted cut-off of   600 ng/mL (FEU) and an 80 year old 800 ng/mL (FEU).      Urinalysis With Culture If Indicated - Urine, Clean Catch [[id number]]    (Normal) Collected: 08/31/23 1522    Specimen: Urine, Clean Catch Updated: 08/31/23 1549     Color, UA Yellow     Appearance, UA Clear     pH, UA 5.5     Specific Gravity, UA 1.015     Glucose, UA Negative     Ketones, UA Negative     Bilirubin, UA Negative     Blood, UA Negative     Protein, UA Negative     Leuk Esterase, UA Negative     Nitrite, UA Negative     Urobilinogen, UA 0.2 E.U./dL    Narrative:      In absence of clinical symptoms, the presence of pyuria, bacteria, and/or   nitrites on the urinalysis result does not correlate with infection.  Urine microscopic not indicated.      Discussed with hospitalist, accepted patient for observation under telemetry floor.    Amount and/or Complexity of Data Reviewed  Labs: ordered.  ECG/medicine tests: ordered.    Risk  Prescription drug management.        Final diagnoses:   Syncope and collapse   Sinus bradycardia by electrocardiogram       ED Disposition  ED Disposition       ED Disposition   Decision to Admit    Condition   --    Comment   Level of Care: Telemetry [5]   " Diagnosis: Syncopal episodes [206000]   Admitting Physician: TRAMAINE ROJAS [[id number]]   Attending Physician: TRAMAINE ROJAS [[id number]]                 No follow-up provider specified.       Medication List      No changes were made to your prescriptions during this visit.            Luis M Lugo MD  08/31/23 7438

## 2023-08-31 NOTE — PROGRESS NOTES
"Brief note - discussed case with hospitalist.     Syncope today while seated, as well as multiple pre-syncopal episodes over \"last few weeks\" per ER note.  Also has episodes of slurred speech.     Mildly orthostatic per hospitalist:  Sitting - BP = 159/66, P = 54  Standing - BP = 120/60, P = 60     CT head = no acute findings  Carotid Dopplers from Apr2023:  B ICAs < 50% stenosis    Agree with plans to perform MRI brain (no contrast) and EEG to screen for a possible seizure etiology of symptoms.     Also agree with plans to stop patient's recently started \"BPH medication\", in case this may be contributing to orthostasis.     Nurses had difficulty finding [facility] tonight - will perform a formal tele-neurology consult in AM.     No charges submitted at this time.  "

## 2023-08-31 NOTE — H&P
[facility] Admission      Date of Admission: 8/31/2023      Primary Care Physician: Yehuda Victor MD      Chief Complaint: loss of conciusness    HPI:Mr. Chacon is a 82-year-old male with cardiac catheterization in 2006 which showed disease in a small PLV branch distally.  Large epicardial vessels were widely patent.  He has been treated medically. PMH include hypertension, hyperlipidemia and remote history of pericardial surgery in the 1970s.  CKD stage 4.  He has diabetes related end organ damage and peripheral neuropathy and orthostasis.  went toDr Blevins. 2021 at [facility] diagnosed with COVID-19 and treated with bamlanivimab. Came to ER after passing out while sitting in a chair, no chest pain, refers shortness of breath frequently and long history sometimes not able to find words to talk.  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     EDEMA      Past Surgical History:   Procedure Laterality Date    CARDIAC CATHETERIZATION      CATARACT EXTRACTION EXTRACAPSULAR W/ INTRAOCULAR LENS IMPLANTATION      LAMINOTOMY Left 01/06/1969    Exc HNP 5th lumbar    OTHER SURGICAL HISTORY  05/31/2016    EXTENDED VISUAL FIELDS STUDY 84668 (Open angle with borderline findings, low risk, unspecified eye    OTHER SURGICAL HISTORY  04/26/2016    FINDINGS OF DIL MAC OR FUND EXAM COMM TO MD 5010F (Type 2 diabetes mellitus with mild nonproliferative  diabetic retinopathy without macular edema    OTHER SURGICAL HISTORY  04/26/2016    OCT DISC NFL 35401 (Type 2 diabetes mellitus 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 Authorizing Provider   albuterol sulfate  (90 Base) MCG/ACT inhaler Inhale 2 puffs Every 4 (Four) Hours As Needed for Wheezing.    Hubert Boykin MD   alfuzosin (UROXATRAL) 10 MG 24 hr tablet Take 1 tablet by mouth Daily.    Hubert Boykin MD   aspirin 81 MG tablet Take 1 tablet by mouth Daily.    Satish Conte MD   atorvastatin (LIPITOR) 20 MG tablet Take 1 tablet by mouth Daily.    Hubert Boykin MD   budesonide-formoterol (SYMBICORT) 160-4.5 MCG/ACT inhaler Inhale 2 puffs 2 (Two) Times a Day.    Hubert Boykin MD   calcitriol (ROCALTROL) 0.25 MCG capsule Take 1 capsule by mouth Daily.    Hubert Boykin MD   chlorhexidine (PERIDEX) 0.12 % solution Apply 15 mL to the mouth or throat 2 (Two) Times a Day.    Hubert Boykin MD   Cholecalciferol (Vitamin D3) 10 MCG (400 UNIT) capsule Take  by mouth Daily.    Hubert Boykin MD   Cinnamon 500 MG tablet Take 1,000 mg by mouth 2 (Two) Times a Day.    Hubert Boykin MD   clopidogrel (PLAVIX) 75 MG tablet Take 1 tablet by mouth Daily. 12/21/18   Hamman, Jack L, MD   Cyanocobalamin 1000 MCG/ML kit Inject  as directed Every 30 (Thirty) Days.    Hubert Boykin MD   FLUoxetine (PROzac) 20 MG capsule Take 1 capsule by mouth Daily.    Hubert Boykin MD   folic acid (FOLVITE) 1 MG tablet Take 1 tablet by mouth Daily.    Hubert Boykin MD   glimepiride (AMARYL) 4 MG tablet Take 1 tablet by mouth Every Morning Before Breakfast.    Hubert Boykin MD   irbesartan  (AVAPRO) 75 MG tablet Take 1 tablet by mouth Every Night.    Hubert Boykin MD   latanoprost (XALATAN) 0.005 % ophthalmic solution ADMINISTER 1 DROP TO BOTH EYES EVERY NIGHT.  Patient taking differently: Administer 1 drop to both eyes. 8/22/17   Henrry Ramirez MD   Omega-3 Fatty Acids (FISH OIL) 1000 MG capsule capsule Take  by mouth Daily With Breakfast.    Hubert Boykin MD   sitaGLIPtin (JANUVIA) 100 MG tablet Take 1 tablet by mouth Daily.    Hubert Boykin MD   timolol (TIMOPTIC) 0.5 % ophthalmic solution Administer 1 drop to both eyes Take As Directed.    Hubert Boykin MD   vitamin C (ASCORBIC ACID) 250 MG tablet Take 2 tablets by mouth Daily.    Hubert Boykin MD   Zinc 50 MG capsule Take 50 mg by mouth Daily.    Hubert Boykin MD      Social History     Socioeconomic History    Marital status:    Tobacco Use    Smoking status: Former     Passive exposure: Past    Smokeless tobacco: Never   Substance and Sexual Activity    Alcohol use: No    Drug use: No    Sexual activity: Defer      Allergies   Allergen Reactions    Olmesartan Other (See Comments)     Patient had cough and dry skin to generic olmsartan. 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    Past Surgical History:  has a past surgical history that includes Other surgical history (05/31/2016); Other surgical history (04/26/2016); Other surgical history (04/26/2016); Other surgical history  (09/30/2015); Laminotomy (Left, 01/06/1969); Pericardiectomy (08/04/1972); Cardiac catheterization; and Cataract extraction, extracapsular w/ intraocular lens implant.    Family History: family history includes Heart disease in his father and mother.     Social History:  reports that he has quit smoking. He has been exposed to tobacco smoke. He has never used smokeless tobacco. He reports that he does not drink alcohol and does not use drugs.    Allergies:   Allergies   Allergen Reactions    Olmesartan Other (See Comments)     Patient had cough and dry skin to generic olmsartan. He requires branded Benicar    Lisinopril Cough     cough    Hydrocodone Cough       Medications:   Prior to Admission medications    Medication Sig Start Date End Date Taking? Authorizing Provider   albuterol sulfate  (90 Base) MCG/ACT inhaler Inhale 2 puffs Every 4 (Four) Hours As Needed for Wheezing.    Hubert Boykin MD   alfuzosin (UROXATRAL) 10 MG 24 hr tablet Take 1 tablet by mouth Daily.    Hubert Boykin MD   aspirin 81 MG tablet Take 1 tablet by mouth Daily.    Satish Conte MD   atorvastatin (LIPITOR) 20 MG tablet Take 1 tablet by mouth Daily.    Hubert Boykin MD   budesonide-formoterol (SYMBICORT) 160-4.5 MCG/ACT inhaler Inhale 2 puffs 2 (Two) Times a Day.    Hubert Boykin MD   calcitriol (ROCALTROL) 0.25 MCG capsule Take 1 capsule by mouth Daily.    Hubert Boykin MD   chlorhexidine (PERIDEX) 0.12 % solution Apply 15 mL to the mouth or throat 2 (Two) Times a Day.    Hubert Boykin MD   Cholecalciferol (Vitamin D3) 10 MCG (400 UNIT) capsule Take  by mouth Daily.    Hubert Boykin MD   Cinnamon 500 MG tablet Take 1,000 mg by mouth 2 (Two) Times a Day.    Hubert Boykin MD   clopidogrel (PLAVIX) 75 MG tablet Take 1 tablet by mouth Daily. 12/21/18   Hamman, Jack L, MD   Cyanocobalamin 1000 MCG/ML kit Inject  as directed Every 30 (Thirty) Days.    Ashutosh  MD Hubert   FLUoxetine (PROzac) 20 MG capsule Take 1 capsule by mouth Daily.    Hubert Boykin MD   folic acid (FOLVITE) 1 MG tablet Take 1 tablet by mouth Daily.    Hubert Boykin MD   glimepiride (AMARYL) 4 MG tablet Take 1 tablet by mouth Every Morning Before Breakfast.    Hubert Boykin MD   irbesartan (AVAPRO) 75 MG tablet Take 1 tablet by mouth Every Night.    Hubert Boykin MD   latanoprost (XALATAN) 0.005 % ophthalmic solution ADMINISTER 1 DROP TO BOTH EYES EVERY NIGHT.  Patient taking differently: Administer 1 drop to both eyes. 8/22/17   Henrry Ramirez MD   Omega-3 Fatty Acids (FISH OIL) 1000 MG capsule capsule Take  by mouth Daily With Breakfast.    Hubert Boykin MD   sitaGLIPtin (JANUVIA) 100 MG tablet Take 1 tablet by mouth Daily.    Hubert Boykin MD   timolol (TIMOPTIC) 0.5 % ophthalmic solution Administer 1 drop to both eyes Take As Directed.    Hubert Boykin MD   vitamin C (ASCORBIC ACID) 250 MG tablet Take 2 tablets by mouth Daily.    Hubert Boykin MD   Zinc 50 MG capsule Take 50 mg by mouth Daily.    Hubert Boykin MD     I have utilized all available immediate resources to obtain, update, or review the patient's current medications (including all prescriptions, over-the-counter products, herbals, cannabis/cannabidiol products, and vitamin/mineral/dietary (nutritional) supplements).      Review of Systems:  Review of Systems   Constitutional:  Negative for activity change, appetite change, chills, diaphoresis and fatigue.   HENT:  Negative for congestion, ear discharge, hearing loss, rhinorrhea, sinus pain, sneezing and sore throat.    Eyes:  Negative for photophobia, discharge and visual disturbance.   Respiratory:  Negative for cough, chest tightness, shortness of breath and wheezing.    Cardiovascular:  Negative for chest pain and palpitations.   Gastrointestinal:  Negative for abdominal distention, abdominal pain,  blood in stool, diarrhea, nausea and vomiting.   Endocrine: Negative for cold intolerance, heat intolerance, polydipsia, polyphagia and polyuria.   Genitourinary:  Negative for dysuria, flank pain, hematuria and urgency.   Musculoskeletal:  Negative for arthralgias, joint swelling and myalgias.   Skin:  Negative for color change.   Allergic/Immunologic: Negative for food allergies.   Neurological:  Negative for dizziness, seizures, syncope, speech difficulty, weakness and headaches.   Hematological:  Negative for adenopathy. Does not bruise/bleed easily.   Psychiatric/Behavioral:  Negative for confusion, hallucinations, self-injury and suicidal ideas. The patient is not nervous/anxious.     Otherwise complete ROS is negative except as mentioned above.    Physical Exam:   Temp:  [97.3 °F (36.3 °C)] 97.3 °F (36.3 °C)  Heart Rate:  [54] 54  Resp:  [20] 20  BP: (159)/(66) 159/66  Physical Exam  Constitutional:       Appearance: Normal appearance.   HENT:      Head: Normocephalic and atraumatic.      Right Ear: Tympanic membrane normal.      Left Ear: Tympanic membrane normal.      Nose: Nose normal.      Mouth/Throat:      Mouth: Mucous membranes are moist.   Eyes:      Pupils: Pupils are equal, round, and reactive to light.   Cardiovascular:      Rate and Rhythm: Normal rate and regular rhythm.      Pulses: Normal pulses.      Heart sounds: Normal heart sounds.   Pulmonary:      Effort: Pulmonary effort is normal.      Breath sounds: Normal breath sounds.   Abdominal:      General: Abdomen is flat. Bowel sounds are normal.      Palpations: Abdomen is soft.   Musculoskeletal:         General: Normal range of motion.      Cervical back: Normal range of motion and neck supple.   Skin:     General: Skin is warm and dry.      Capillary Refill: Capillary refill takes less than 2 seconds.   Neurological:      General: No focal deficit present.      Mental Status: He is alert and oriented to person, place, and time.    Psychiatric:         Mood and Affect: Mood normal.         Behavior: Behavior normal.         Thought Content: Thought content normal.         Judgment: Judgment normal.         Results Reviewed:  I have personally reviewed current lab, radiology, and data and agree with results.  Lab Results (last 24 hours)       Procedure Component Value Units Date/Time    [place] Draw [[id number]] Collected: 08/31/23 1517    Specimen: Blood Updated: 08/31/23 1632    Narrative:      The following orders were created for panel order [place] Draw.  Procedure                               Abnormality         Status                     ---------                               -----------         ------                     Green Top (Gel)[[id number]]                                  Final result               Lavender Top[[id number]]                                     Final result               Gold Top - SST[[id number]]                                   Final result               Light Blue Top[[id number]]                                   Final result                 Please view results for these tests on the individual orders.    Gold Top - SST [[id number]] Collected: 08/31/23 1517    Specimen: Blood Updated: 08/31/23 1632     Extra Tube Hold for add-ons.     Comment: Auto resulted.       Green Top (Gel) [[id number]] Collected: 08/31/23 1517    Specimen: Blood Updated: 08/31/23 1632     Extra Tube Hold for add-ons.     Comment: Auto resulted.       Lavender Top [[id number]] Collected: 08/31/23 1517    Specimen: Blood Updated: 08/31/23 1632     Extra Tube hold for add-on     Comment: Auto resulted       Light Blue Top [[id number]] Collected: 08/31/23 1517    Specimen: Blood Updated: 08/31/23 1632     Extra Tube Hold for add-ons.     Comment: Auto resulted       D-dimer, Quantitative [[id number]]  (Normal) Collected: 08/31/23 1517    Specimen: Blood Updated: 08/31/23 1552     D-Dimer, Quantitative 290 ng/mL (FEU)     Narrative:      According  "to the assay 's published package insert, a normal (<500 ng/mL (FEU)) D-dimer result in conjunction with a non-high clinical probability assessment, excludes deep vein thrombosis (DVT) and pulmonary embolism (PE) with high sensitivity.    D-dimer values increase with age and this can make VTE exclusion of an older population difficult. To address this, the [employer], based on best available evidence and recent guidelines, recommends that clinicians use age-adjusted D-dimer thresholds in patients greater than 50 years of age with: a) a low probability of PE who do not meet all Pulmonary Embolism Rule Out Criteria, or b) in those with intermediate probability of PE.   The formula for an age-adjusted D-dimer cut-off is \"age*10\".  For example, a 60 year old patient would have an age-adjusted cut-off of 600 ng/mL (FEU) and an 80 year old 800 ng/mL (FEU).      Urinalysis With Culture If Indicated - Urine, Clean Catch [[id number]]  (Normal) Collected: 08/31/23 1522    Specimen: Urine, Clean Catch Updated: 08/31/23 1549     Color, UA Yellow     Appearance, UA Clear     pH, UA 5.5     Specific Gravity, UA 1.015     Glucose, UA Negative     Ketones, UA Negative     Bilirubin, UA Negative     Blood, UA Negative     Protein, UA Negative     Leuk Esterase, UA Negative     Nitrite, UA Negative     Urobilinogen, UA 0.2 E.U./dL    Narrative:      In absence of clinical symptoms, the presence of pyuria, bacteria, and/or nitrites on the urinalysis result does not correlate with infection.  Urine microscopic not indicated.    Comprehensive Metabolic Panel [[id number]]  (Abnormal) Collected: 08/31/23 1517    Specimen: Blood Updated: 08/31/23 1544     Glucose 189 mg/dL      BUN 45 mg/dL      Creatinine 1.82 mg/dL      Sodium 136 mmol/L      Potassium 4.7 mmol/L      Chloride 102 mmol/L      CO2 20.0 mmol/L      Calcium 9.2 mg/dL      Total Protein 7.3 g/dL      Albumin 4.1 g/dL      ALT (SGPT) 18 U/L      " AST (SGOT) 17 U/L      Alkaline Phosphatase 87 U/L      Total Bilirubin 0.4 mg/dL      Globulin 3.2 gm/dL      A/G Ratio 1.3 g/dL      BUN/Creatinine Ratio 24.7     Anion Gap 14.0 mmol/L      eGFR 36.9 mL/min/1.73     Narrative:      GFR Normal >60  Chronic Kidney Disease <60  Kidney Failure <15    The GFR formula is only valid for adults with stable renal function between ages 18 and 70.    Magnesium [[id number]]  (Normal) Collected: 08/31/23 1517    Specimen: Blood Updated: 08/31/23 1544     Magnesium 2.0 mg/dL     Single High Sensitivity Troponin T [[id number]]  (Abnormal) Collected: 08/31/23 1517    Specimen: Blood Updated: 08/31/23 1544     HS Troponin T 20 ng/L     Narrative:      High Sensitive Troponin T Reference Range:  <10.0 ng/L- Negative Female for AMI  <15.0 ng/L- Negative Male for AMI  >=10 - Abnormal Female indicating possible myocardial injury.  >=15 - Abnormal Male indicating possible myocardial injury.   Clinicians would have to utilize clinical acumen, EKG, Troponin, and serial changes to determine if it is an Acute Myocardial Infarction or myocardial injury due to an underlying chronic condition.         CBC & Differential [[id number]]  (Abnormal) Collected: 08/31/23 1517    Specimen: Blood Updated: 08/31/23 1525    Narrative:      The following orders were created for panel order CBC & Differential.  Procedure                               Abnormality         Status                     ---------                               -----------         ------                     CBC Auto Differential[[id number]]        Abnormal            Final result                 Please view results for these tests on the individual orders.    CBC Auto Differential [[id number]]  (Abnormal) Collected: 08/31/23 1517    Specimen: Blood Updated: 08/31/23 1525     WBC 6.93 10*3/mm3      RBC 4.11 10*6/mm3      Hemoglobin 12.7 g/dL      Hematocrit 38.4 %      MCV 93.4 fL      MCH 30.9 pg      MCHC 33.1 g/dL      RDW 12.4 %       RDW-SD 42.5 fl      MPV 10.9 fL      Platelets 169 10*3/mm3      Neutrophil % 64.6 %      Lymphocyte % 25.5 %      Monocyte % 7.4 %      Eosinophil % 2.3 %      Basophil % 0.1 %      Immature Grans % 0.1 %      Neutrophils, Absolute 4.47 10*3/mm3      Lymphocytes, Absolute 1.77 10*3/mm3      Monocytes, Absolute 0.51 10*3/mm3      Eosinophils, Absolute 0.16 10*3/mm3      Basophils, Absolute 0.01 10*3/mm3      Immature Grans, Absolute 0.01 10*3/mm3      nRBC 0.0 /100 WBC           Imaging Results (Last 24 Hours)       ** No results found for the last 24 hours. **              Assessment:    Active Hospital Problems    Diagnosis     **Syncopal episodes              Assessment and Plan: 82 years old male patient past medical history hypertension, hyperlipidemia and remote history of pericardial surgery in the 1970s.  CKD stage 4.  He has diabetes related end organ damage and peripheral neuropathy and orthostasis. Came after syncopal episode, will need cardiac work up, sees Dr Blevins, will call consult, performed orthostatics positive, will hold BPH medications. Concerns of slurred speech in the past, will call neurology consult to rule out seizures or TIA, because of creatinine CT head without contrast and EEG, echocardiogram. BP lying 158/86 and standing 120/60  1.syncopal episode, discussed with Dr Villa, will order MRI.  Orthostatic  Cardiac  neurologic    Medical Decision Making  Number and Complexity of problems: 4  Differential Diagnosis: syncope    Conditions and Status:        Condition is improving.     [facility] Data  External documents reviewed: previous medical records  My EKG interpretation: first degree av block with bradycardia  My plain film interpretation: none  Tests considered but not ordered: none     Decision rules/scores evaluated (example [id number], Wells, etc): n/a     Discussed with: patient an his wife Jeanie     Treatment Plan  Admit to telemetry  Echocardiogram  CT head  EEG  Cardiology  consult  Neurology consult    Care Planning  Shared decision making: wife Jeanie  Code status and discussions: full code    Disposition  Social Determinants of Health that impact treatment or disposition: none  I expect the patient to be discharged to home in 2 days.      I confirmed that the patient's Advance Care Plan is present, code status is documented, or surrogate decision maker is listed in the patient's medical record.    I discussed the patient's findings and my recommendations with: nursing staff, patient, wife      This document has been electronically signed by Peter Kim MD on August 31, 2023 16:43 CDT

## 2023-08-31 NOTE — Clinical Note
Level of Care: Telemetry [5]   Diagnosis: Syncopal episodes [206000]   Admitting Physician: TRAMAINE ROJAS [[id number]]   Attending Physician: TRAMAINE ROJAS [[id number]]  
negative

## 2023-08-31 NOTE — ED NOTES
Pt wife states he was sitting at the table when he started breathing heavy and passed out. Pt did not fall to the floor.

## 2023-09-01 ENCOUNTER — APPOINTMENT (OUTPATIENT)
Dept: PULMONOLOGY | Facility: HOSPITAL | Age: 83
End: 2023-09-01
Payer: COMMERCIAL

## 2023-09-01 ENCOUNTER — APPOINTMENT (OUTPATIENT)
Dept: NUCLEAR MEDICINE | Facility: HOSPITAL | Age: 83
End: 2023-09-01
Payer: MEDICARE

## 2023-09-01 ENCOUNTER — APPOINTMENT (OUTPATIENT)
Dept: CARDIOLOGY | Facility: HOSPITAL | Age: 83
End: 2023-09-01
Payer: COMMERCIAL

## 2023-09-01 ENCOUNTER — APPOINTMENT (OUTPATIENT)
Dept: MRI IMAGING | Facility: HOSPITAL | Age: 83
End: 2023-09-01
Payer: MEDICARE

## 2023-09-01 ENCOUNTER — APPOINTMENT (OUTPATIENT)
Dept: GENERAL RADIOLOGY | Facility: HOSPITAL | Age: 83
End: 2023-09-01
Payer: MEDICARE

## 2023-09-01 LAB
ALBUMIN SERPL-MCNC: 3.8 G/DL (ref 3.5–5.2)
ALBUMIN/GLOB SERPL: 1.4 G/DL
ALP SERPL-CCNC: 75 U/L (ref 39–117)
ALT SERPL W P-5'-P-CCNC: 14 U/L (ref 1–41)
ANION GAP SERPL CALCULATED.3IONS-SCNC: 8 MMOL/L (ref 5–15)
AST SERPL-CCNC: 14 U/L (ref 1–40)
BH CV ECHO MEAS - ACS: 1.99 CM
BH CV ECHO MEAS - AO MAX PG: 6.5 MMHG
BH CV ECHO MEAS - AO MEAN PG: 4 MMHG
BH CV ECHO MEAS - AO ROOT DIAM: 3.8 CM
BH CV ECHO MEAS - AO V2 MAX: 127 CM/SEC
BH CV ECHO MEAS - AO V2 VTI: 32.5 CM
BH CV ECHO MEAS - AVA(I,D): 2.2 CM2
BH CV ECHO MEAS - EDV(CUBED): 104.7 ML
BH CV ECHO MEAS - EDV(MOD-SP2): 121 ML
BH CV ECHO MEAS - EDV(MOD-SP4): 121 ML
BH CV ECHO MEAS - EF(MOD-BP): 63 %
BH CV ECHO MEAS - EF(MOD-SP2): 63.4 %
BH CV ECHO MEAS - EF(MOD-SP4): 62.9 %
BH CV ECHO MEAS - ESV(CUBED): 19.1 ML
BH CV ECHO MEAS - ESV(MOD-SP2): 44.3 ML
BH CV ECHO MEAS - ESV(MOD-SP4): 44.9 ML
BH CV ECHO MEAS - FS: 43.3 %
BH CV ECHO MEAS - IVS/LVPW: 1.05 CM
BH CV ECHO MEAS - IVSD: 1.04 CM
BH CV ECHO MEAS - LA DIMENSION: 3.7 CM
BH CV ECHO MEAS - LAT PEAK E' VEL: 14.5 CM/SEC
BH CV ECHO MEAS - LV DIASTOLIC VOL/BSA (35-75): 62.6 CM2
BH CV ECHO MEAS - LV MASS(C)D: 168.1 GRAMS
BH CV ECHO MEAS - LV MAX PG: 3.1 MMHG
BH CV ECHO MEAS - LV MEAN PG: 2 MMHG
BH CV ECHO MEAS - LV SYSTOLIC VOL/BSA (12-30): 23.2 CM2
BH CV ECHO MEAS - LV V1 MAX: 87.8 CM/SEC
BH CV ECHO MEAS - LV V1 VTI: 22 CM
BH CV ECHO MEAS - LVIDD: 4.7 CM
BH CV ECHO MEAS - LVIDS: 2.7 CM
BH CV ECHO MEAS - LVOT AREA: 3.3 CM2
BH CV ECHO MEAS - LVOT DIAM: 2.04 CM
BH CV ECHO MEAS - LVPWD: 0.99 CM
BH CV ECHO MEAS - MED PEAK E' VEL: 7.3 CM/SEC
BH CV ECHO MEAS - MR MAX PG: 107.8 MMHG
BH CV ECHO MEAS - MR MAX VEL: 519.1 CM/SEC
BH CV ECHO MEAS - MV A MAX VEL: 93.6 CM/SEC
BH CV ECHO MEAS - MV DEC SLOPE: 649.1 CM/SEC2
BH CV ECHO MEAS - MV DEC TIME: 0.19 MSEC
BH CV ECHO MEAS - MV E MAX VEL: 114 CM/SEC
BH CV ECHO MEAS - MV E/A: 1.22
BH CV ECHO MEAS - MV MAX PG: 6.3 MMHG
BH CV ECHO MEAS - MV MEAN PG: 2.12 MMHG
BH CV ECHO MEAS - MV P1/2T: 58.2 MSEC
BH CV ECHO MEAS - MV V2 VTI: 44.4 CM
BH CV ECHO MEAS - MVA(P1/2T): 3.8 CM2
BH CV ECHO MEAS - MVA(VTI): 1.61 CM2
BH CV ECHO MEAS - PA V2 MAX: 112.7 CM/SEC
BH CV ECHO MEAS - PI END-D VEL: 168.3 CM/SEC
BH CV ECHO MEAS - RAP SYSTOLE: 3 MMHG
BH CV ECHO MEAS - RVDD: 2.42 CM
BH CV ECHO MEAS - RVSP: 27.3 MMHG
BH CV ECHO MEAS - SI(MOD-SP2): 39.7 ML/M2
BH CV ECHO MEAS - SI(MOD-SP4): 39.4 ML/M2
BH CV ECHO MEAS - SV(LVOT): 71.6 ML
BH CV ECHO MEAS - SV(MOD-SP2): 76.7 ML
BH CV ECHO MEAS - SV(MOD-SP4): 76.1 ML
BH CV ECHO MEAS - TAPSE (>1.6): 1.84 CM
BH CV ECHO MEAS - TR MAX PG: 24.3 MMHG
BH CV ECHO MEAS - TR MAX VEL: 246.5 CM/SEC
BH CV ECHO MEASUREMENTS AVERAGE E/E' RATIO: 10.46
BH CV ECHO SHUNT ASSESSMENT PERFORMED (HIDDEN SCRIPTING): 1
BILIRUB SERPL-MCNC: 0.3 MG/DL (ref 0–1.2)
BUN SERPL-MCNC: 40 MG/DL (ref 8–23)
BUN/CREAT SERPL: 22.6 (ref 7–25)
CALCIUM SPEC-SCNC: 9.1 MG/DL (ref 8.6–10.5)
CHLORIDE SERPL-SCNC: 109 MMOL/L (ref 98–107)
CO2 SERPL-SCNC: 25 MMOL/L (ref 22–29)
CREAT SERPL-MCNC: 1.77 MG/DL (ref 0.76–1.27)
DEPRECATED RDW RBC AUTO: 42.5 FL (ref 37–54)
EGFRCR SERPLBLD CKD-EPI 2021: 38.1 ML/MIN/1.73
ERYTHROCYTE [DISTWIDTH] IN BLOOD BY AUTOMATED COUNT: 12.3 % (ref 12.3–15.4)
GLOBULIN UR ELPH-MCNC: 2.7 GM/DL
GLUCOSE SERPL-MCNC: 74 MG/DL (ref 65–99)
HBA1C MFR BLD: 8.2 % (ref 4.8–5.6)
HCT VFR BLD AUTO: 35.8 % (ref 37.5–51)
HGB BLD-MCNC: 11.8 G/DL (ref 13–17.7)
IRON 24H UR-MRATE: 77 MCG/DL (ref 59–158)
IRON SATN MFR SERPL: 24 % (ref 20–50)
LEFT ATRIUM VOLUME INDEX: 26.5 ML/M2
MAGNESIUM SERPL-MCNC: 2 MG/DL (ref 1.6–2.4)
MAXIMAL PREDICTED HEART RATE: 139
MCH RBC QN AUTO: 31.2 PG (ref 26.6–33)
MCHC RBC AUTO-ENTMCNC: 33 G/DL (ref 31.5–35.7)
MCV RBC AUTO: 94.7 FL (ref 79–97)
PHOSPHATE SERPL-MCNC: 3.6 MG/DL (ref 2.5–4.5)
PLATELET # BLD AUTO: 183 10*3/MM3 (ref 140–450)
PMV BLD AUTO: 11.1 FL (ref 6–12)
POTASSIUM SERPL-SCNC: 4.5 MMOL/L (ref 3.5–5.2)
PROT SERPL-MCNC: 6.5 G/DL (ref 6–8.5)
RBC # BLD AUTO: 3.78 10*6/MM3 (ref 4.14–5.8)
SODIUM SERPL-SCNC: 142 MMOL/L (ref 136–145)
STRESS TARGET HR: 118
TIBC SERPL-MCNC: 326 MCG/DL (ref 298–536)
TRANSFERRIN SERPL-MCNC: 219 MG/DL (ref 200–360)
TSH SERPL DL<=0.05 MIU/L-ACNC: 2.46 UIU/ML (ref 0.27–4.2)
WBC NRBC COR # BLD: 5.18 10*3/MM3 (ref 3.4–10.8)

## 2023-09-01 PROCEDURE — 25010000002 ATROPINE SULFATE

## 2023-09-01 PROCEDURE — 83036 HEMOGLOBIN GLYCOSYLATED A1C: CPT

## 2023-09-01 PROCEDURE — 95816 EEG AWAKE AND DROWSY: CPT

## 2023-09-01 PROCEDURE — 93306 TTE W/DOPPLER COMPLETE: CPT

## 2023-09-01 PROCEDURE — 96372 THER/PROPH/DIAG INJ SC/IM: CPT

## 2023-09-01 PROCEDURE — 78582 LUNG VENTILAT&PERFUS IMAGING: CPT

## 2023-09-01 PROCEDURE — A9567 TECHNETIUM TC-99M AEROSOL: HCPCS

## 2023-09-01 PROCEDURE — G0378 HOSPITAL OBSERVATION PER HR: HCPCS

## 2023-09-01 PROCEDURE — 71046 X-RAY EXAM CHEST 2 VIEWS: CPT

## 2023-09-01 PROCEDURE — 96376 TX/PRO/DX INJ SAME DRUG ADON: CPT

## 2023-09-01 PROCEDURE — 96361 HYDRATE IV INFUSION ADD-ON: CPT

## 2023-09-01 PROCEDURE — 94799 UNLISTED PULMONARY SVC/PX: CPT

## 2023-09-01 PROCEDURE — 83540 ASSAY OF IRON: CPT

## 2023-09-01 PROCEDURE — 0 TECHNETIUM ALBUMIN AGGREGATED

## 2023-09-01 PROCEDURE — 93306 TTE W/DOPPLER COMPLETE: CPT | Performed by: INTERNAL MEDICINE

## 2023-09-01 PROCEDURE — 99204 OFFICE O/P NEW MOD 45 MIN: CPT | Performed by: INTERNAL MEDICINE

## 2023-09-01 PROCEDURE — 83735 ASSAY OF MAGNESIUM: CPT

## 2023-09-01 PROCEDURE — A9540 TC99M MAA: HCPCS

## 2023-09-01 PROCEDURE — 84100 ASSAY OF PHOSPHORUS: CPT

## 2023-09-01 PROCEDURE — 70551 MRI BRAIN STEM W/O DYE: CPT

## 2023-09-01 PROCEDURE — 84466 ASSAY OF TRANSFERRIN: CPT

## 2023-09-01 PROCEDURE — 25010000002 HEPARIN (PORCINE) PER 1000 UNITS

## 2023-09-01 PROCEDURE — 0 TECHNETIUM TC 99M PENTETATE INHALER

## 2023-09-01 PROCEDURE — 80053 COMPREHEN METABOLIC PANEL: CPT

## 2023-09-01 PROCEDURE — 85027 COMPLETE CBC AUTOMATED: CPT

## 2023-09-01 RX ADMIN — SODIUM CHLORIDE 100 ML/HR: 9 INJECTION, SOLUTION INTRAVENOUS at 03:38

## 2023-09-01 RX ADMIN — Medication 10 ML: at 10:14

## 2023-09-01 RX ADMIN — Medication 10 ML: at 21:38

## 2023-09-01 RX ADMIN — ATORVASTATIN CALCIUM 20 MG: 20 TABLET, FILM COATED ORAL at 10:11

## 2023-09-01 RX ADMIN — KIT FOR THE PREPARATION OF TECHNETIUM TC 99M ALBUMIN AGGREGATED 1 DOSE: 2.5 INJECTION, POWDER, FOR SOLUTION INTRAVENOUS at 09:32

## 2023-09-01 RX ADMIN — ATROPINE SULFATE 1 MG: 0.1 INJECTION INTRAVENOUS at 03:37

## 2023-09-01 RX ADMIN — TIMOLOL MALEATE 1 DROP: 5 SOLUTION OPHTHALMIC at 10:11

## 2023-09-01 RX ADMIN — CLOPIDOGREL BISULFATE 75 MG: 75 TABLET ORAL at 10:13

## 2023-09-01 RX ADMIN — Medication 2.5 MG: at 10:11

## 2023-09-01 RX ADMIN — KIT FOR THE PREPARATION OF TECHNETIUM TC 99M PENTETATE 1 DOSE: 20 INJECTION, POWDER, LYOPHILIZED, FOR SOLUTION INTRAVENOUS; RESPIRATORY (INHALATION) at 09:10

## 2023-09-01 RX ADMIN — LINAGLIPTIN 5 MG: 5 TABLET, FILM COATED ORAL at 10:12

## 2023-09-01 RX ADMIN — FLUOXETINE 20 MG: 20 CAPSULE ORAL at 10:12

## 2023-09-01 RX ADMIN — ASPIRIN 81 MG: 81 TABLET, COATED ORAL at 10:12

## 2023-09-01 RX ADMIN — LATANOPROST 1 DROP: 50 SOLUTION OPHTHALMIC at 20:04

## 2023-09-01 RX ADMIN — CALCITRIOL 0.25 MCG: 0.25 CAPSULE ORAL at 10:12

## 2023-09-01 RX ADMIN — BUDESONIDE AND FORMOTEROL FUMARATE DIHYDRATE 2 PUFF: 160; 4.5 AEROSOL RESPIRATORY (INHALATION) at 19:20

## 2023-09-01 RX ADMIN — HEPARIN SODIUM 5000 UNITS: 5000 INJECTION INTRAVENOUS; SUBCUTANEOUS at 10:10

## 2023-09-01 RX ADMIN — FOLIC ACID 1 MG: 1 TABLET ORAL at 10:12

## 2023-09-01 RX ADMIN — LOSARTAN POTASSIUM 25 MG: 25 TABLET, FILM COATED ORAL at 10:12

## 2023-09-01 RX ADMIN — CHLORHEXIDINE GLUCONATE 15 ML: 1.2 SOLUTION ORAL at 20:03

## 2023-09-01 RX ADMIN — HEPARIN SODIUM 5000 UNITS: 5000 INJECTION INTRAVENOUS; SUBCUTANEOUS at 20:04

## 2023-09-01 RX ADMIN — TIMOLOL MALEATE 1 DROP: 5 SOLUTION OPHTHALMIC at 20:04

## 2023-09-01 RX ADMIN — CHLORHEXIDINE GLUCONATE 15 ML: 1.2 SOLUTION ORAL at 10:11

## 2023-09-01 NOTE — PROGRESS NOTES
[facility]   INPATIENT PROGRESS NOTE      Patient Name: David Chacon  Date of Admission: 8/31/2023  Today's Date: 09/01/23  Length of Stay: 0  Primary Care Physician: Yehuda Victor MD    Subjective   Chief Complaint: Syncope  HPI   Mr. Chacon is a 82-year-old male with cardiac catheterization in 2006 which showed disease in a small PLV branch distally.  Large epicardial vessels were widely patent.  He has been treated medically. PMH include hypertension, hyperlipidemia and remote history of pericardial surgery in the 1970s.  CKD stage 4.  He has diabetes related end organ damage and peripheral neuropathy and orthostasis.  went to Dr Blevins. 2021 at [facility] diagnosed with COVID-19 and treated with bamlanivimab. Came to ER after passing out while sitting in a chair, no chest pain, refers shortness of breath frequently and long history sometimes not able to find words to talk.    Daily assessment  9/1/23  Denies syncope during hospitalization.  Patient's son present during interview with Dr. Roman today.  States patient has history of previously positive tilt table test.  Patient experienced bradycardia heart rate in the 30s overnight and required atropine x1 administration.    Review of Systems   All pertinent negatives and positives are as above. All other systems have been reviewed and are negative unless otherwise stated.     Objective    Temp:  [96.9 °F (36.1 °C)-98.3 °F (36.8 °C)] 97.5 °F (36.4 °C)  Heart Rate:  [42-80] 53  Resp:  [18-20] 18  BP: (107-152)/(56-78) 134/63    Intake/Output Summary (Last 24 hours) at 9/1/2023 1614  Last data filed at 9/1/2023 1601  Gross per 24 hour   Intake 2161.67 ml   Output 1475 ml   Net 686.67 ml      Net IO Since Admission: 686.67 mL [09/01/23 1615]     Physical Exam  Constitutional:       Appearance: Normal appearance. He is normal weight.   HENT:      Head: Normocephalic and atraumatic.      Right Ear:  Ear canal normal.      Left Ear: Ear canal normal.      Nose: Nose normal.      Mouth/Throat:      Mouth: Mucous membranes are moist.      Pharynx: Oropharynx is clear.   Eyes:      Extraocular Movements: Extraocular movements intact.      Conjunctiva/sclera: Conjunctivae normal.      Pupils: Pupils are equal, round, and reactive to light.   Cardiovascular:      Rate and Rhythm: Normal rate and regular rhythm.      Pulses: Normal pulses.      Heart sounds: Normal heart sounds.   Pulmonary:      Effort: Pulmonary effort is normal.      Breath sounds: Normal breath sounds.   Abdominal:      General: Abdomen is flat. Bowel sounds are normal.      Palpations: Abdomen is soft.   Musculoskeletal:         General: Normal range of motion.      Cervical back: Normal range of motion and neck supple.   Skin:     General: Skin is warm and dry.      Capillary Refill: Capillary refill takes less than 2 seconds.   Neurological:      General: No focal deficit present.      Mental Status: He is alert and oriented to person, place, and time. Mental status is at baseline.   Psychiatric:         Mood and Affect: Mood normal.         Behavior: Behavior normal.         Thought Content: Thought content normal.         Judgment: Judgment normal.           Results Review:  I have reviewed the labs, radiology results, and diagnostic studies.    Laboratory Data:   Results from last 7 days   Lab Units 09/01/23  0516 08/31/23  1517   WBC 10*3/mm3 5.18 6.93   HEMOGLOBIN g/dL 11.8* 12.7*   HEMATOCRIT % 35.8* 38.4   PLATELETS 10*3/mm3 183 169        Results from last 7 days   Lab Units 09/01/23  0516 08/31/23  1517   SODIUM mmol/L 142 136   POTASSIUM mmol/L 4.5 4.7   CHLORIDE mmol/L 109* 102   CO2 mmol/L 25.0 20.0*   BUN mg/dL 40* 45*   CREATININE mg/dL 1.77* 1.82*   CALCIUM mg/dL 9.1 9.2   BILIRUBIN mg/dL 0.3 0.4   ALK PHOS U/L 75 87   ALT (SGPT) U/L 14 18   AST (SGOT) U/L 14 17   GLUCOSE mg/dL 74 189*       Culture Data:   No results found for:  BLOODCX  No results found for: URINECX  No results found for: RESPCX  No results found for: WOUNDCX  No results found for: STOOLCX  No components found for: BODYFLD    Radiology Data:   Imaging Results (Last 24 Hours)       Procedure Component Value Units Date/Time    XR Chest 2 View [[id number]] Collected: 09/01/23 1034     Updated: 09/01/23 1059    Narrative:      HISTORY:  The patient has a history of chest pain.    VIEWS:  Frontal film of the chest obtained.    FINDINGS:  No infiltrate, effusion or pneumothorax is seen.    NM Lung Ventilation Perfusion [[id number]] Collected: 09/01/23 1016     Updated: 09/01/23 1019    Narrative:      HISTORY:  Chest pain, nonspecific    COMPARISON:  Chest radiograph from today.    TECHNIQUE:  35 mCi technetium 99m DTPA for ventilation.  6.6 mCi technetium 99m MAA intravenously for perfusion.  Multiple projections over the chest were made during the ventilation and  perfusion phases.    FINDINGS:  Distribution of radiopharmaceutical on the perfusion phase is homogeneous.  No  mismatched segmental or subsegmental perfusion defects are identified.      Impression:      Normal VQ scan.    MRI Brain Without Contrast [[id number]] Collected: 09/01/23 0842     Updated: 09/01/23 0849    Narrative:      MRI BRAIN WO CONTRAST    HISTORY: Headache, chronic, no new features    COMPARISON: Head CT dated 8/31/2023    TECHNIQUE: Multiplanar imaging of the brain was performed in a routine fashion  without contrast.    FINDINGS:  Diffusion: No restriction of diffusion to suggest acute ischemia.    Midline structures: Nondisplaced.    Ventricles: The ventricles are prominent in size, but there is no evidence of  hydrocephalus. This is consistent with generalized volume loss.    Masses: No masses or mass effect.    Basilar cisterns: Maintained.    Extra axial space: Prominence of the extra-axial spaces is consistent with  generalized volume loss.    Gray-white matter signal: Increased T2/FLAIR signal  in the periventricular and  subcortical white matter, consistent with chronic microvascular ischemia.    Cerebellum: Normal.    Brainstem: T2 hyperintensities in the brandon consistent with chronic small vessel  disease.    Other: Proximal cervical spinal cord is normal. Bilateral globes and orbits are  unremarkable. Normal cerebrovascular flow voids noted. No abnormal signal in the  mastoid air cells or paranasal sinuses.      Impression:        No evidence of acute intracranial process. Moderate chronic small vessel disease    CT Head Without Contrast [[id number]] Collected: 08/31/23 1738     Updated: 08/31/23 1954    Narrative:      Chronic headache.    TECHNIQUE:  Axial images were performed from the calvarium through the base of the skull  followed by 2D multiplanar reformats.    Ventricles and sulci are within normal limits.  No hydrocephalus or midline  shift is seen.  No acute hemorrhage or a space-occupying lesion is identified.      Impression:      No acute intracranial hemorrhage or a space-occupying lesion is seen.    US Carotid Bilateral [[id number]] Collected: 08/31/23 1928     Updated: 08/31/23 1935    Narrative:      INDICATION:  slurred speech.    TECHNIQUE:  High-resolution gray scale imaging, color Doppler, velocity recordings, and  spectral analysis of the common carotid, external carotid, and internal carotid  arteries bilaterally were obtained.  Waveform analysis of the bilateral  vertebral arteries was performed.    FINDINGS:  Moderate atherosclerotic plaquing involves both carotid systems.  Antegrade flow  within both vertebral arteries.    Right distal CCA peak systolic velocity = 111 cm/s  Right ICA peak systolic velocity = 111 cm/s    Left distal CCA peak systolic velocity = 83 cm/s  Left ICA peak systolic velocity = 150 cm/s      Impression:      Less than 50% narrowing within the bilateral ICAs.                I have reviewed the patient's current medications.     Scheduled Meds:aspirin, 81  mg, Oral, Daily  atorvastatin, 20 mg, Oral, Daily  budesonide-formoterol, 2 puff, Inhalation, BID - RT  calcitriol, 0.25 mcg, Oral, Daily  chlorhexidine, 15 mL, Mouth/Throat, Q12H  clopidogrel, 75 mg, Oral, Daily  FLUoxetine, 20 mg, Oral, Daily  folic acid, 1 mg, Oral, Daily  glipizide, 2.5 mg, Oral, QAM AC  heparin (porcine), 5,000 Units, Subcutaneous, Q12H  latanoprost, 1 drop, Both Eyes, Nightly  linagliptin, 5 mg, Oral, Daily  senna-docusate sodium, 2 tablet, Oral, BID  sodium chloride, 10 mL, Intravenous, Q12H  timolol, 1 drop, Both Eyes, BID      Continuous Infusions:   PRN Meds:.  albuterol    senna-docusate sodium **AND** polyethylene glycol **AND** bisacodyl **AND** bisacodyl    Calcium Replacement - Follow Nurse / BPA Driven Protocol    Magnesium Standard Dose Replacement - Follow Nurse / BPA Driven Protocol    ondansetron **OR** ondansetron    Phosphorus Replacement - Follow Nurse / BPA Driven Protocol    Potassium Replacement - Follow Nurse / BPA Driven Protocol    sodium chloride    sodium chloride    sodium chloride   Assessment/Plan     Active Hospital Problems    Diagnosis     **Syncopal episodes     Syncope      Syncope orthostatic hypotension versus arrhythmia:  - 9/1 MRI brain within normal limits.  EEG within normal limits.  No neurological etiology of syncope noted by neurology consult will consider tilt table test during hospitalization.  Status post IV hydration since admission with patient 600 cc net positive since admission.  - 9/1 PT OT during hospitalization.  Worried about bradycardia overnight implementing possible heart block or sick sinus syndrome.  Patient currently being evaluated by cardiology during hospitalization.  -9/1 discontinue IV fluids as patient now appears euvolemic.      Diabetes on oral medication: Continue home oral medication.  Follow-up with hospitalization  Mood disorder: Continue fluoxetine  Glaucoma: Continue Toprol  Hyperlipidemia: Continue statin    BPH:  Tamsulosin held due to orthostatic hypotension issues.  Consult urology for urinary retention evaluation per patient's son's request.    PPx: Heparin subcutaneous    Medical Decision Making  Number and Complexity of problems: 2 major  Differential Diagnosis: Syncope orthostatic hypotension, bradycardia, diabetes, GERD,    Conditions and Status:        Condition is improving.     I have utilized all available immediate resources to obtain, update, or review the patient's current medications (including all prescriptions, over-the-counter products, herbals, cannabis/cannabidiol products, and vitamin/mineral/dietary (nutritional) supplements).     [facility] Data  External documents reviewed: Up-to-date, care everywhere  My EKG interpretation:   My MRI brain interpretation: No acute ischemia  Tests considered but not ordered:      Decision rules/scores evaluated (example [id number], [place], etc):      Discussed with: Patient's son, patient's family, cardiology Dr. Leander CARD     Treatment Plan  See A&P    Care Planning  Shared decision making: Patient's son, patient's family, cardiology Dr. Leander CARD  Code status and discussions: Full    Disposition  Social Determinants of Health that impact treatment or disposition: None   I expect the patient to be discharged to home with home health versus SNF in 1 to 3 days.         Electronically signed by Winston Roman MD, 09/01/23, 16:10 CDT.

## 2023-09-01 NOTE — TREATMENT PLAN
Patient is dropped down into the 30s with his heart rate to have agreed with the nursing staff that we will give a dose of atropine now and can give further as needed doses throughout the night as clinically necessary and appropriate    Electronically signed by Josh Hill MD, 08/31/23, 10:35 PM CDT.

## 2023-09-01 NOTE — CONSULTS
NEUROLOGY CONSULT NOTE    David Chacon      [id number]         1940 9/1/2023            13:41 CDT    Encounter Date: 8/31/2023     Provider Location: office    Patient identification confirmed by two-factor authentication: Yes    Patient Location: [place]    I attest the following telehealth platform was used during the video visit: [email or web address]    Primary Care Physician:  Yehuda Victor MD    Reason for Consult:  Syncope.    Consult Requested By:  Peter Kim MD.    History of Present Illness  83 YO WM with H/O HTN, DM-2/retinopathy/neuropathy, dyslipidemia, BPH.  Patient was in his usual state, sitting at a table in his home in the early afternoon of 31Aug2023 when he began to breathe heavily and had LOC.  He has had similar episodes in the past, with breathing irregularity, but no LOC.  His wife witnessed the LOC, during which the patient's mouth was open.  He came back to consciousness, and there was some trembling of his hands, but no overt GTC activity, and no tongue biting or incontinence.  The patient felt hot and sweaty when he awoke.  No H/A, N/V, vertigo, vision changes.  No hemibody motor or sensory symptoms.  He was able to walk to the family car with assistance, and was brought to [facility] ER for eval.  Admitted to syncope - found to be mildly orthostatic.  Patient says that he recently began a new med for BPH; a previous BPH med caused neurological symptoms.  He had a low HR into the 30's overnight, and was given atropine.  Feels back to baseline at this time.    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     Epiretinal membrane      OS, s/p PPV, doing well       Exotropia     - intermittent, stable       Glaucoma suspect     Hyperlipidemia     Hypertension      Nonproliferative diabetic retinopathy     MILD    Nuclear cataract     Type 2 diabetes mellitus 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     EDEMA       Past Surgical History  Past Surgical History:   Procedure Laterality Date    CARDIAC CATHETERIZATION      CATARACT EXTRACTION EXTRACAPSULAR W/ INTRAOCULAR LENS IMPLANTATION      LAMINOTOMY Left 01/06/1969    Exc HNP 5th lumbar    OTHER SURGICAL HISTORY  05/31/2016    EXTENDED VISUAL FIELDS STUDY 02870 (Open angle with borderline findings, low risk, unspecified eye    OTHER SURGICAL HISTORY  04/26/2016    FINDINGS OF DIL MAC OR FUND EXAM COMM TO MD 5010F (Type 2 diabetes mellitus 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    OTHER SURGICAL HISTORY  04/26/2016    OCT DISC NFL 19531 (Type 2 diabetes mellitus 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)     OTHER SURGICAL HISTORY  09/30/2015    OCT SCAN RETINA 71152 (Epiretinal membrane    PERICARDIECTOMY  08/04/1972    Pericarditis etiol undetermined       Medications  Current Facility-Administered Medications   Medication Dose Route Frequency Provider Last Rate Last Admin    albuterol (PROVENTIL) nebulizer solution 0.083% 2.5 mg/3mL  2.5 mg Nebulization Q4H PRN Peter Kim MD        aspirin EC tablet 81 mg  81 mg Oral Daily Peter Kim MD   81 mg at 09/01/23 1012    atorvastatin (LIPITOR) tablet 20 mg  20 mg Oral Daily Peter Kim MD   20 mg at 09/01/23 1011    sennosides-docusate (PERICOLACE) 8.6-50 MG per tablet 2 tablet  2 tablet Oral BID Peter Kim MD        And    polyethylene glycol (MIRALAX) packet 17 g  17 g Oral Daily PRN Peter Kim MD        And    bisacodyl (DULCOLAX) EC tablet 5 mg  5 mg Oral Daily PRN Peter Kim MD        And    bisacodyl (DULCOLAX) suppository 10 mg  10 mg Rectal Daily PRN Peter Kim MD        budesonide-formoterol (SYMBICORT) 160-4.5 MCG/ACT inhaler 2 puff  2 puff Inhalation  BID - RT Peter Kim MD        calcitriol (ROCALTROL) capsule 0.25 mcg  0.25 mcg Oral Daily Peter Kim MD   0.25 mcg at 09/01/23 1012    Calcium Replacement - Follow Nurse / BPA Driven Protocol   Does not apply PRN Peter Kim MD        chlorhexidine (PERIDEX) 0.12 % solution 15 mL  15 mL Mouth/Throat Q12H Peter Kim MD   15 mL at 09/01/23 1011    clopidogrel (PLAVIX) tablet 75 mg  75 mg Oral Daily Peter Kim MD   75 mg at 09/01/23 1013    FLUoxetine (PROzac) capsule 20 mg  20 mg Oral Daily Peter Kim MD   20 mg at 09/01/23 1012    folic acid (FOLVITE) tablet 1 mg  1 mg Oral Daily Peter Kim MD   1 mg at 09/01/23 1012    glipizide (GLUCOTROL) half tablet 2.5 mg  2.5 mg Oral QAM AC Peter Kim MD   2.5 mg at 09/01/23 1011    heparin (porcine) 5000 UNIT/ML injection 5,000 Units  5,000 Units Subcutaneous Q12H Peter Kim MD   5,000 Units at 09/01/23 1010    latanoprost (XALATAN) 0.005 % ophthalmic solution 1 drop  1 drop Both Eyes Nightly Peter Kim MD   1 drop at 08/31/23 2137    linagliptin (TRADJENTA) tablet 5 mg  5 mg Oral Daily Peter Kim MD   5 mg at 09/01/23 1012    losartan (COZAAR) tablet 25 mg  25 mg Oral Q24H Peter Kim MD   25 mg at 09/01/23 1012    Magnesium Standard Dose Replacement - Follow Nurse / BPA Driven Protocol   Does not apply PRN Peter Kim MD        ondansetron (ZOFRAN) tablet 4 mg  4 mg Oral Q6H PRN Peter Kim MD        Or    ondansetron (ZOFRAN) injection 4 mg  4 mg Intravenous Q6H PRN Peter Kim MD        Phosphorus Replacement - Follow Nurse / BPA Driven Protocol   Does not apply PRN Peter Kim MD        Potassium Replacement - Follow Nurse / BPA Driven Protocol   Does not apply PRN Peter Kim MD        sodium chloride 0.9 % flush 10 mL  10 mL Intravenous PRN Peter Kim MD        sodium chloride 0.9 % flush 10 mL  10 mL Intravenous Q12H Peter Kim MD   10 mL at 09/01/23 1014    sodium chloride 0.9 %  flush 10 mL  10 mL Intravenous PRN Peter Kim MD        sodium chloride 0.9 % infusion 40 mL  40 mL Intravenous PRN Peter Kim MD        sodium chloride 0.9 % infusion  100 mL/hr Intravenous Continuous Peter Kim  mL/hr at 09/01/23 1301 100 mL/hr at 09/01/23 1301    timolol (TIMOPTIC) 0.5 % ophthalmic solution 1 drop  1 drop Both Eyes BID Peter Kim MD   1 drop at 09/01/23 1011       Social History  Social History     Socioeconomic History    Marital status:    Tobacco Use    Smoking status: Former     Passive exposure: Past    Smokeless tobacco: Never   Vaping Use    Vaping Use: Never used   Substance and Sexual Activity    Alcohol use: No    Drug use: No    Sexual activity: Defer       Family History  Family History   Problem Relation Age of Onset    Heart disease Mother         MI    Heart disease Father         MI       Review of Systems  Ten organ based ROS was performed and negative except for positives mentioned in the HPI.    Physical Examination    Vital Signs  Temp: 97.2 °F (36.2 °C) BP: 152/67 Heart Rate: 51 Resp: 18 SpO2: 97 %       Intake/Output Summary (Last 24 hours) at 9/1/2023 1341  Last data filed at 9/1/2023 1301  Gross per 24 hour   Intake 1861.67 ml   Output 1475 ml   Net 386.67 ml     Baseline Weight: 77.1 kg (170 lb)  Most recent Weight: 73.9 kg (162 lb 14.7 oz)    Body mass index is 22.73 kg/m².     General Exam  MENTATION:  Mild psychomotor slowing.  CONSTITUTIONAL:  Well-nourished; no acute distress; appears documented age.  HEENT:  Normocephalic/atraumatic; EOMI/PERRL; face symmetric; tongue midline.  NECK:  Supple.  MUSCULOSKELETAL:  No deformities; normal bulk and tone.  EXTREMITIES:  No cyanosis, clubbing, or edema.  PSYCHIATRIC:  Affect appropriate; no hallucinations or agitation.    Neurological Exam  LEVEL OF CONSCIOUSNESS:  Awake, alert, and oriented x 4.  HIGHER CORTICAL FUNCTIONS:  No aphasia, apraxia, or agnosia.  CRANIAL NERVES:  EOMI/PERRL;  visual fields full; face symmetric; tongue midline; facial sensation intact; sternocleidomastoid 5/5.  MOTOR:  Normal muscle bulk, tone, and strength bilaterally; no atrophy and no fasciculations; no abnormal movements.  SENSATION:  Intact to light touch.  COORDINATION:  Finger-to-nose testing normal, without dysmetria.  GAIT:  Not tested at this visit.    Laboratory Results  Recent Results (from the past 24 hour(s))   ECG 12 Lead Syncope    Collection Time: 08/31/23  3:09 PM   Result Value Ref Range    QT Interval 458 ms    QTC Interval 429 ms   Comprehensive Metabolic Panel    Collection Time: 08/31/23  3:17 PM    Specimen: Blood   Result Value Ref Range    Glucose 189 (H) 65 - 99 mg/dL    BUN 45 (H) 8 - 23 mg/dL    Creatinine 1.82 (H) 0.76 - 1.27 mg/dL    Sodium 136 136 - 145 mmol/L    Potassium 4.7 3.5 - 5.2 mmol/L    Chloride 102 98 - 107 mmol/L    CO2 20.0 (L) 22.0 - 29.0 mmol/L    Calcium 9.2 8.6 - 10.5 mg/dL    Total Protein 7.3 6.0 - 8.5 g/dL    Albumin 4.1 3.5 - 5.2 g/dL    ALT (SGPT) 18 1 - 41 U/L    AST (SGOT) 17 1 - 40 U/L    Alkaline Phosphatase 87 39 - 117 U/L    Total Bilirubin 0.4 0.0 - 1.2 mg/dL    Globulin 3.2 gm/dL    A/G Ratio 1.3 g/dL    BUN/Creatinine Ratio 24.7 7.0 - 25.0    Anion Gap 14.0 5.0 - 15.0 mmol/L    eGFR 36.9 (L) >60.0 mL/min/1.73   Magnesium    Collection Time: 08/31/23  3:17 PM    Specimen: Blood   Result Value Ref Range    Magnesium 2.0 1.6 - 2.4 mg/dL   Single High Sensitivity Troponin T    Collection Time: 08/31/23  3:17 PM    Specimen: Blood   Result Value Ref Range    HS Troponin T 20 (H) <15 ng/L   Green Top (Gel)    Collection Time: 08/31/23  3:17 PM   Result Value Ref Range    Extra Tube Hold for add-ons.    Lavender Top    Collection Time: 08/31/23  3:17 PM   Result Value Ref Range    Extra Tube hold for add-on    Gold Top - SST    Collection Time: 08/31/23  3:17 PM   Result Value Ref Range    Extra Tube Hold for add-ons.    Light Blue Top    Collection Time: 08/31/23   3:17 PM   Result Value Ref Range    Extra Tube Hold for add-ons.    CBC Auto Differential    Collection Time: 08/31/23  3:17 PM    Specimen: Blood   Result Value Ref Range    WBC 6.93 3.40 - 10.80 10*3/mm3    RBC 4.11 (L) 4.14 - 5.80 10*6/mm3    Hemoglobin 12.7 (L) 13.0 - 17.7 g/dL    Hematocrit 38.4 37.5 - 51.0 %    MCV 93.4 79.0 - 97.0 fL    MCH 30.9 26.6 - 33.0 pg    MCHC 33.1 31.5 - 35.7 g/dL    RDW 12.4 12.3 - 15.4 %    RDW-SD 42.5 37.0 - 54.0 fl    MPV 10.9 6.0 - 12.0 fL    Platelets 169 140 - 450 10*3/mm3    Neutrophil % 64.6 42.7 - 76.0 %    Lymphocyte % 25.5 19.6 - 45.3 %    Monocyte % 7.4 5.0 - 12.0 %    Eosinophil % 2.3 0.3 - 6.2 %    Basophil % 0.1 0.0 - 1.5 %    Immature Grans % 0.1 0.0 - 0.5 %    Neutrophils, Absolute 4.47 1.70 - 7.00 10*3/mm3    Lymphocytes, Absolute 1.77 0.70 - 3.10 10*3/mm3    Monocytes, Absolute 0.51 0.10 - 0.90 10*3/mm3    Eosinophils, Absolute 0.16 0.00 - 0.40 10*3/mm3    Basophils, Absolute 0.01 0.00 - 0.20 10*3/mm3    Immature Grans, Absolute 0.01 0.00 - 0.05 10*3/mm3    nRBC 0.0 0.0 - 0.2 /100 WBC   D-dimer, Quantitative    Collection Time: 08/31/23  3:17 PM    Specimen: Blood   Result Value Ref Range    D-Dimer, Quantitative 290 0 - 810 ng/mL (FEU)   TSH Rfx On Abnormal To Free T4    Collection Time: 08/31/23  3:17 PM    Specimen: Blood   Result Value Ref Range    TSH 2.460 0.270 - 4.200 uIU/mL   Urinalysis With Culture If Indicated - Urine, Clean Catch    Collection Time: 08/31/23  3:22 PM    Specimen: Urine, Clean Catch   Result Value Ref Range    Color, UA Yellow Yellow, Straw, Dark Yellow, Ioana    Appearance, UA Clear Clear    pH, UA 5.5 5.0 - 9.0    Specific [place], UA 1.015 1.003 - 1.030    Glucose, UA Negative Negative    Ketones, UA Negative Negative    Bilirubin, UA Negative Negative    Blood, UA Negative Negative    Protein, UA Negative Negative    Leuk Esterase, UA Negative Negative    Nitrite, UA Negative Negative    Urobilinogen, UA 0.2 E.U./dL 0.2 - 1.0  E.U./dL   POC Glucose Once    Collection Time: 08/31/23  9:22 PM    Specimen: Blood   Result Value Ref Range    Glucose 218 (H) 70 - 130 mg/dL   Comprehensive Metabolic Panel    Collection Time: 09/01/23  5:16 AM    Specimen: Blood   Result Value Ref Range    Glucose 74 65 - 99 mg/dL    BUN 40 (H) 8 - 23 mg/dL    Creatinine 1.77 (H) 0.76 - 1.27 mg/dL    Sodium 142 136 - 145 mmol/L    Potassium 4.5 3.5 - 5.2 mmol/L    Chloride 109 (H) 98 - 107 mmol/L    CO2 25.0 22.0 - 29.0 mmol/L    Calcium 9.1 8.6 - 10.5 mg/dL    Total Protein 6.5 6.0 - 8.5 g/dL    Albumin 3.8 3.5 - 5.2 g/dL    ALT (SGPT) 14 1 - 41 U/L    AST (SGOT) 14 1 - 40 U/L    Alkaline Phosphatase 75 39 - 117 U/L    Total Bilirubin 0.3 0.0 - 1.2 mg/dL    Globulin 2.7 gm/dL    A/G Ratio 1.4 g/dL    BUN/Creatinine Ratio 22.6 7.0 - 25.0    Anion Gap 8.0 5.0 - 15.0 mmol/L    eGFR 38.1 (L) >60.0 mL/min/1.73   Hemoglobin A1c    Collection Time: 09/01/23  5:16 AM    Specimen: Blood   Result Value Ref Range    Hemoglobin A1C 8.20 (H) 4.80 - 5.60 %   Iron Profile    Collection Time: 09/01/23  5:16 AM    Specimen: Blood   Result Value Ref Range    Iron 77 59 - 158 mcg/dL    Iron Saturation (TSAT) 24 20 - 50 %    Transferrin 219 200 - 360 mg/dL    TIBC 326 298 - 536 mcg/dL   Phosphorus    Collection Time: 09/01/23  5:16 AM    Specimen: Blood   Result Value Ref Range    Phosphorus 3.6 2.5 - 4.5 mg/dL   Magnesium    Collection Time: 09/01/23  5:16 AM    Specimen: Blood   Result Value Ref Range    Magnesium 2.0 1.6 - 2.4 mg/dL   CBC Auto Differential    Collection Time: 09/01/23  5:16 AM    Specimen: Blood   Result Value Ref Range    WBC 5.18 3.40 - 10.80 10*3/mm3    RBC 3.78 (L) 4.14 - 5.80 10*6/mm3    Hemoglobin 11.8 (L) 13.0 - 17.7 g/dL    Hematocrit 35.8 (L) 37.5 - 51.0 %    MCV 94.7 79.0 - 97.0 fL    MCH 31.2 26.6 - 33.0 pg    MCHC 33.0 31.5 - 35.7 g/dL    RDW 12.3 12.3 - 15.4 %    RDW-SD 42.5 37.0 - 54.0 fl    MPV 11.1 6.0 - 12.0 fL    Platelets 183 140 - 450  10*3/mm3   Adult Transthoracic Echo Complete W/ Cont if Necessary Per Protocol    Collection Time: 09/01/23  9:23 AM   Result Value Ref Range    Target HR (85%) 118     Max. Pred. HR (100%) 139     TAPSE (>1.6) 1.84 cm    LV Sys Vol (BSA corrected) 23.2 cm2    LV Alcantar Vol (BSA corrected) 62.6 cm2    TR max conner 246.5 cm/sec    MV E max conner 114.0 cm/sec    MV A max conner 93.6 cm/sec    MR max conner 519.1 cm/sec    MVA(P1/2t) 3.8 cm2    MV mean PG 2.12 mmHg    MV max PG 6.3 mmHg    MV E/A 1.22     LVOT diam 2.04 cm    LVOT area 3.3 cm2    LVIDs 2.7 cm    LVIDd 4.7 cm    LV V1 VTI 22.0 cm    LA dimension (2D)  3.7 cm    IVSd 1.04 cm    FS 43.3 %    Ao mean PG 4.0 mmHg    Ao max PG 6.5 mmHg    TR max PG 24.3 mmHg    SV(MOD-sp4) 76.1 ml    SV(MOD-sp2) 76.7 ml    SV(LVOT) 71.6 ml    SI(MOD-sp4) 39.4 ml/m2    SI(MOD-sp2) 39.7 ml/m2    RVSP(TR) 27.3 mmHg    RVIDd 2.42 cm    RAP systole 3.0 mmHg    PI end-d conner 168.3 cm/sec    PA V2 max 112.7 cm/sec    MVA(VTI) 1.61 cm2    MV V2 VTI 44.4 cm    MV P1/2t 58.2 msec    MV dec time 0.19 msec    MV dec slope 649.1 cm/sec2    MR max .8 mmHg    Med Peak E' Conner 7.3 cm/sec    LVPWd 0.99 cm    LV V1 max 87.8 cm/sec    LV V1 mean PG 2.00 mmHg    LV V1 max PG 3.1 mmHg    LV mass(C)d 168.1 grams    Lat Peak E' Conner 14.5 cm/sec    IVS/LVPW 1.05 cm    ESV(MOD-sp4) 44.9 ml    ESV(MOD-sp2) 44.3 ml    ESV(cubed) 19.1 ml    EF(MOD-sp4) 62.9 %    EF(MOD-sp2) 63.4 %    EF(MOD-bp) 63.4 %    EDV(MOD-sp4) 121.0 ml    EDV(MOD-sp2) 121.0 ml    EDV(cubed) 104.7 ml    AUSTEN(I,D) 2.20 cm2    Ao V2 VTI 32.5 cm    Ao pk conner 127.0 cm/sec    Ao root diam 3.8 cm    ACS 1.99 cm    LA ESV Index (BP) 26.5 ml/m2    Avg E/e' ratio 10.46     BH CV ECHO SHUNT ASSESSMENT PERFORMED (HIDDEN SCRIPTING) 1        Radiological Results  MRI Brain Without Contrast    Result Date: 9/1/2023  MRI BRAIN WO CONTRAST HISTORY: Headache, chronic, no new features COMPARISON: Head CT dated 8/31/2023 TECHNIQUE: Multiplanar imaging of the  brain was performed in a routine fashion without contrast. FINDINGS: Diffusion: No restriction of diffusion to suggest acute ischemia. Midline structures: Nondisplaced. Ventricles: The ventricles are prominent in size, but there is no evidence of hydrocephalus. This is consistent with generalized volume loss. Masses: No masses or mass effect. Basilar cisterns: Maintained. Extra axial space: Prominence of the extra-axial spaces is consistent with generalized volume loss. Gray-white matter signal: Increased T2/FLAIR signal in the periventricular and subcortical white matter, consistent with chronic microvascular ischemia. Cerebellum: Normal. Brainstem: T2 hyperintensities in the brandon consistent with chronic small vessel disease. Other: Proximal cervical spinal cord is normal. Bilateral globes and orbits are unremarkable. Normal cerebrovascular flow voids noted. No abnormal signal in the mastoid air cells or paranasal sinuses.     Impression: No evidence of acute intracranial process. Moderate chronic small vessel disease      Medications reviewed.    Assessment/Plan  Syncope vs seizure - given the above description, would suspect syncope, possibly due to arrhythmia/bradycardia.  Exam now nonfocal.  MRI brain = no acute infarct; moderate small-vessel changes.     EEG = normal.  Carotid Dopplers showed < 50% stenosis B ICAs.     Continue cardiac and urology W/U.     Discussed with patient's family.     Above interview and exam was performed remotely via tele-neurology video, with the assistance of STEPHIE Rodriguez, who was physically present.    David Swartz MD

## 2023-09-01 NOTE — CONSULTS
[facility] [place] Cardiology Consult    Referring Provider: Jimmie Sharma DO    Reason for Consultation: Syncope    Patient Care Team:  Yehuda Victor MD as PCP - General    Chief complaint   Chief Complaint   Patient presents with    Syncope       Subjective .     History of present illness:     Mr. David Chacon is a 82-year-old  male with medical history of hypertension, hyperlipidemia, CAD, CKD, diabetes, peripheral neuropathy, orthostasis, BPH, glaucoma, CAYLA and he presented to the ER with syncopal episode.  He reports having chronic orthostasis having tilt table in 2018 with Dr. Satish Conte however over the past few weeks having 2 episodes of syncope with worsening dizziness.  The first time he passed out in his building while standing, the second time he passed out while sitting at the kitchen table.  This episode was witnessed by his wife.  Denied any seizure-like activity.  Episode is accompanied by shortness of breath.  Denies palpitations, chest pain, edema, PND.    High-sensitivity troponin mildly elevated and trended flat at 20, creatinine at 1.82.  Chest x-ray clear.  Normal VQ scan.  RI brain negative for acute process, moderate chronic small vessel disease.  Carotid ultrasound showing less than 50% narrowing within both bilateral ICAs.  CT negative.  EKG showing sinus bradycardia, first-degree AV block, no acute ST-T wave changes.  Telemetry showing sinus bradycardia and he was given atropine x1.  Echocardiogram pending.    The CVD Risk score (D'Agostino, et al., 2008) failed to calculate for the following reasons:    The 2008 CVD risk score is only valid for ages 30 to 74    The patient has a prior MI, stroke, CHF, or peripheral vascular disease diagnosis      Review of Systems  Review of Systems   Constitutional:  Negative for activity change, chills, fatigue, fever and unexpected weight change.   HENT:  Negative for hearing loss, nosebleeds, tinnitus, trouble swallowing and voice  change.    Eyes:  Negative for visual disturbance.   Respiratory:  Negative for apnea, cough, chest tightness, shortness of breath and wheezing.    Cardiovascular:  Negative for chest pain, palpitations and leg swelling.   Gastrointestinal:  Negative for abdominal distention, abdominal pain and blood in stool.   Endocrine: Negative for cold intolerance, heat intolerance, polydipsia, polyphagia and polyuria.   Genitourinary: Negative.    Musculoskeletal:  Negative for arthralgias and back pain.   Skin: Negative.    Allergic/Immunologic: Negative.    Neurological:  Positive for dizziness and syncope. Negative for seizures, speech difficulty, numbness and headaches.   Hematological:  Negative for adenopathy. Does not bruise/bleed easily.   Psychiatric/Behavioral:  Negative for agitation, behavioral problems and suicidal ideas. The patient is not nervous/anxious.      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     EDEMA   ,   Past Surgical History:   Procedure Laterality Date    CARDIAC CATHETERIZATION      CATARACT EXTRACTION EXTRACAPSULAR W/ INTRAOCULAR LENS IMPLANTATION      LAMINOTOMY Left 01/06/1969    Exc HNP 5th lumbar    OTHER SURGICAL HISTORY  05/31/2016    EXTENDED VISUAL FIELDS STUDY 08636 (Open angle with borderline findings, low risk, unspecified eye    OTHER SURGICAL HISTORY  04/26/2016    FINDINGS OF DIL MAC OR FUND EXAM COMM TO MD 5010F (Type 2 diabetes mellitus with mild  nonproliferative diabetic retinopathy without macular edema    OTHER SURGICAL HISTORY  04/26/2016    OCT DISC NFL 20289 (Type 2 diabetes mellitus 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)     OTHER SURGICAL HISTORY  09/30/2015    OCT SCAN RETINA 14188 (Epiretinal membrane    PERICARDIECTOMY  08/04/1972    Pericarditis etiol undetermined   ,   Family History   Problem Relation Age of Onset    Heart disease Mother         MI    Heart disease Father         MI   ,   Social History     Tobacco Use    Smoking status: Former     Passive exposure: Past    Smokeless tobacco: Never   Vaping Use    Vaping Use: Never used   Substance Use Topics    Alcohol use: No    Drug use: No   ,   Medications Prior to Admission   Medication Sig Dispense Refill Last Dose    alfuzosin (UROXATRAL) 10 MG 24 hr tablet Take 1 tablet by mouth Daily.   8/30/2023    atorvastatin (LIPITOR) 20 MG tablet Take 1 tablet by mouth Daily.   8/30/2023    calcitriol (ROCALTROL) 0.25 MCG capsule Take 1 capsule by mouth Every Other Day. Pt said they take this every other day. Pt said he is due for this today.   Patient Taking Differently    clopidogrel (PLAVIX) 75 MG tablet Take 1 tablet by mouth Daily. 30 tablet 11 8/31/2023    folic acid (FOLVITE) 1 MG tablet Take 1 tablet by mouth Daily.   8/31/2023    glimepiride (AMARYL) 4 MG tablet Take 1 tablet by mouth 2 (Two) Times a Day. Pt takes this once in the morning and once before bed at home   Patient Taking Differently    albuterol sulfate  (90 Base) MCG/ACT inhaler Inhale 2 puffs Every 4 (Four) Hours As Needed for Wheezing.       aspirin 81 MG tablet Take 1 tablet by mouth Daily. 30 tablet 11     budesonide-formoterol (SYMBICORT) 160-4.5 MCG/ACT inhaler Inhale 2 puffs 2 (Two) Times a Day.       chlorhexidine (PERIDEX) 0.12 % solution Apply 15 mL to the mouth or throat 2 (Two) Times a Day.       Cholecalciferol (Vitamin D3) 10 MCG (400 UNIT) capsule Take  by mouth Daily.     "   Cinnamon 500 MG tablet Take 1,000 mg by mouth 2 (Two) Times a Day.       Cyanocobalamin 1000 MCG/ML kit Inject  as directed Every 30 (Thirty) Days.       FLUoxetine (PROzac) 20 MG capsule Take 1 capsule by mouth Daily.       irbesartan (AVAPRO) 75 MG tablet Take 1 tablet by mouth Every Night.       latanoprost (XALATAN) 0.005 % ophthalmic solution ADMINISTER 1 DROP TO BOTH EYES EVERY NIGHT. (Patient taking differently: Administer 1 drop to both eyes.) 2.5 mL 12     Omega-3 Fatty Acids (FISH OIL) 1000 MG capsule capsule Take  by mouth Daily With Breakfast.       sitaGLIPtin (JANUVIA) 100 MG tablet Take 1 tablet by mouth Daily.       timolol (TIMOPTIC) 0.5 % ophthalmic solution Administer 1 drop to both eyes Take As Directed.       vitamin C (ASCORBIC ACID) 250 MG tablet Take 2 tablets by mouth Daily.       Zinc 50 MG capsule Take 50 mg by mouth Daily.         ALLERGIES  Olmesartan, Lisinopril, and Hydrocodone    Objective     Vital Sign Min/Max for last 24 hours  Temp  Min: 96.9 °F (36.1 °C)  Max: 98.3 °F (36.8 °C)   BP  Min: 107/56  Max: 159/66   Pulse  Min: 42  Max: 80   Resp  Min: 18  Max: 20   SpO2  Min: 95 %  Max: 98 %   No data recorded   Weight  Min: 73.9 kg (162 lb 14.7 oz)  Max: 77.1 kg (170 lb)     Flowsheet Rows      Flowsheet Row First Filed Value   Admission Height 180.3 cm (71\") Documented at 08/31/2023 1500   Admission Weight 77.1 kg (170 lb) Documented at 08/31/2023 1500               Physical Exam:  Physical Exam  Vitals and nursing note reviewed.   Constitutional:       General: He is not in acute distress.     Appearance: Normal appearance. He is well-developed. He is not diaphoretic.   HENT:      Head: Normocephalic.      Right Ear: External ear normal.      Left Ear: External ear normal.   Eyes:      General: Lids are normal.      Pupils: Pupils are equal, round, and reactive to light.   Neck:      Thyroid: No thyromegaly.      Vascular: Carotid bruit (right soft) present. No JVD.      " Trachea: No tracheal deviation.   Cardiovascular:      Rate and Rhythm: Normal rate and regular rhythm.      Heart sounds: Normal heart sounds. No murmur heard.    No friction rub. No gallop.   Pulmonary:      Effort: Pulmonary effort is normal. No respiratory distress.      Breath sounds: Normal breath sounds. No stridor. No wheezing or rales.   Chest:      Chest wall: No tenderness.   Abdominal:      General: Bowel sounds are normal. There is no distension.      Palpations: Abdomen is soft.      Tenderness: There is no abdominal tenderness.   Skin:     General: Skin is warm and dry.      Findings: No erythema or rash.   Neurological:      Mental Status: He is alert and oriented to person, place, and time.      Cranial Nerves: No cranial nerve deficit.      Deep Tendon Reflexes: Reflexes are normal and symmetric. Reflexes normal.   Psychiatric:         Behavior: Behavior normal.         Thought Content: Thought content normal.         Judgment: Judgment normal.          Results Review:     Results from last 7 days   Lab Units 09/01/23  0516 08/31/23  1517   SODIUM mmol/L 142 136   POTASSIUM mmol/L 4.5 4.7   CHLORIDE mmol/L 109* 102   CO2 mmol/L 25.0 20.0*   BUN mg/dL 40* 45*   CREATININE mg/dL 1.77* 1.82*   CALCIUM mg/dL 9.1 9.2   BILIRUBIN mg/dL 0.3 0.4   ALK PHOS U/L 75 87   ALT (SGPT) U/L 14 18   AST (SGOT) U/L 14 17   GLUCOSE mg/dL 74 189*       Estimated Creatinine Clearance: 34.2 mL/min (A) (by C-G formula based on SCr of 1.77 mg/dL (H)).    Results from last 7 days   Lab Units 09/01/23  0516 08/31/23  1517   MAGNESIUM mg/dL 2.0 2.0   PHOSPHORUS mg/dL 3.6  --        Results from last 7 days   Lab Units 09/01/23  0516 08/31/23  1517   WBC 10*3/mm3 5.18 6.93   HEMOGLOBIN g/dL 11.8* 12.7*   PLATELETS 10*3/mm3 183 169             Lab Results   Component Value Date    TROPONINI <0.050 09/17/2020    TROPONINT 20 (H) 08/31/2023     No results found for: PROBNP    I/O last 3 completed shifts:  In: -   Out: 1375  [Urine:1375]    Cardiographics  ECG/EMG Results (last 24 hours)       Procedure Component Value Units Date/Time    ECG 12 Lead Syncope [[id number]] Collected: 08/31/23 1509     Updated: 08/31/23 1520     QT Interval 458 ms      QTC Interval 429 ms     Narrative:      Test Reason : Syncope  Blood Pressure :   */*   mmHG  Vent. Rate :  53 BPM     Atrial Rate :  53 BPM     P-R Int : 216 ms          QRS Dur :  98 ms      QT Int : 458 ms       P-R-T Axes :  16   9  57 degrees     QTc Int : 429 ms    Sinus bradycardia with 1st degree AV block  Otherwise normal ECG  When compared with ECG of 03-JAN-2021 11:14,  Nonspecific T wave abnormality no longer evident in Inferior leads  Nonspecific T wave abnormality no longer evident in Anterior leads    Referred By:            Confirmed By:     SCANNED EKG [[id number]] Resulted: 08/31/23     Updated: 08/31/23 2024    Adult Transthoracic Echo Complete W/ Cont if Necessary Per Protocol [[id number]] Resulted: 09/01/23 0924     Updated: 09/01/23 0924     Target HR (85%) 118     Max. Pred. HR (100%) 139     TAPSE (>1.6) 1.84 cm      LV Sys Vol (BSA corrected) 23.2 cm2      LV Alcantar Vol (BSA corrected) 62.6 cm2      TR max luigi 246.5 cm/sec      MV E max luigi 114.0 cm/sec      MV A max luigi 93.6 cm/sec      MR max luigi 519.1 cm/sec      MVA(P1/2t) 3.8 cm2      MV mean PG 2.12 mmHg      MV max PG 6.3 mmHg      MV E/A 1.22     LVOT diam 2.04 cm      LVOT area 3.3 cm2      LVIDs 2.7 cm      LVIDd 4.7 cm      LV V1 VTI 22.0 cm      LA dimension (2D)  3.7 cm      IVSd 1.04 cm      FS 43.3 %      Ao mean PG 4.0 mmHg      Ao max PG 6.5 mmHg      TR max PG 24.3 mmHg      SV(MOD-sp4) 76.1 ml      SV(MOD-sp2) 76.7 ml      SV(LVOT) 71.6 ml      SI(MOD-sp4) 39.4 ml/m2      SI(MOD-sp2) 39.7 ml/m2      RVSP(TR) 27.3 mmHg      RVIDd 2.42 cm      RAP systole 3.0 mmHg      PI end-d luigi 168.3 cm/sec      PA V2 max 112.7 cm/sec      MVA(VTI) 1.61 cm2      MV V2 VTI 44.4 cm      MV P1/2t 58.2 msec      MV dec  time 0.19 msec      MV dec slope 649.1 cm/sec2      MR max .8 mmHg      Med Peak E' Conner 7.3 cm/sec      LVPWd 0.99 cm      LV V1 max 87.8 cm/sec      LV V1 mean PG 2.00 mmHg      LV V1 max PG 3.1 mmHg      LV mass(C)d 168.1 grams      Lat Peak E' Conner 14.5 cm/sec      IVS/LVPW 1.05 cm      ESV(MOD-sp4) 44.9 ml      ESV(MOD-sp2) 44.3 ml      ESV(cubed) 19.1 ml      EF(MOD-sp4) 62.9 %      EF(MOD-sp2) 63.4 %      EF(MOD-bp) 63.4 %      EDV(MOD-sp4) 121.0 ml      EDV(MOD-sp2) 121.0 ml      EDV(cubed) 104.7 ml      AUSTEN(I,D) 2.20 cm2      Ao V2 VTI 32.5 cm      Ao pk conner 127.0 cm/sec      Ao root diam 3.8 cm      ACS 1.99 cm      LA ESV Index (BP) 26.5 ml/m2      Avg E/e' ratio 10.46     BH CV ECHO SHUNT ASSESSMENT PERFORMED (HIDDEN SCRIPTING) 1          Results for orders placed in visit on 05/01/17    Adult Transthoracic Echo Complete    Interpretation Summary  · Left ventricular function is normal. Estimated EF = 55%.  · Left ventricular diastolic dysfunction (grade I) consistent with impaired relaxation.  · Mild tricuspid valve regurgitation is present.        EEG    Result Date: 9/1/2023  Normal study No evidence for epilepsy is seen on this study This report is transcribed using the Dragon dictation system.      CT Head Without Contrast    Result Date: 8/31/2023  No acute intracranial hemorrhage or a space-occupying lesion is seen.    MRI Brain Without Contrast    Result Date: 9/1/2023  No evidence of acute intracranial process. Moderate chronic small vessel disease    NM Lung Ventilation Perfusion    Result Date: 9/1/2023  Normal VQ scan.    US Carotid Bilateral    Result Date: 8/31/2023  Less than 50% narrowing within the bilateral ICAs.      Intake/Output         08/31/23 0700 - 09/01/23 0659 09/01/23 0700 - 09/02/23 0659    Intake (ml) -- 1861.7    Output (ml) 1375 100    Net (ml) -1375 1761.7    Last Weight 73.9 kg (163 lb) 73.9 kg (162 lb 14.7 oz)              Assessment & Plan       Syncopal episodes     Syncope    #1. Syncope, recurrent with chronic orthostasis/autonomic dysfunction secondary to diabetes.  EKG showing sinus bradycardia, first-degree AV block.  Telemetry showing sinus bradycardia with no bradycardia arrhythmias.  Neurological work-up including MRI and CT benign. Neurology consult pending. Carotid duplex showing mild stenosis less than 50%.    -Echocardiogram pending  -Possible worsening symptoms with BPH medication which was held  -Orthostatic vital signs found to be positive upon arrival.  He has been given fluid resuscitation and BPH medication stopped. Stop Losartan for now.  -Recommend outpatient Holter monitor to further assess for bradycardia arrhythmias  -Consideration for tilt table as an outpatient    Recommend adequate fluid intake, increase sodium intake, compression stockings, change positions slowly. Dr. Blevins discussed midodrine and patient/family not interested at this time.     #2.  CAD: Denies angina. Cardiac catheterization in 2006 which showed disease in a small PLV branch distally.  Large epicardial vessels were widely patent.  He has been treated medically  -Continue aspirin, Plavix, Lipitor 20 mg. No BB due to baseline bradycardia    #3.  Hyperlipidemia: Chronic, continue statin    I discussed the patient's findings and my recommendations with patient and family and Dr. Blevins. Thank you for the consult. Dr. Ordaz to provide weekend coverage.             This document has been electronically signed by STEPHIE Alvarez on September 1, 2023 13:33 CDT     Electronically signed by STEPHIE Alvarez, 09/01/23, 1:33 PM CDT.    Patient examined and chart reviewed by me in detail at 12:45 PM today.  Agree with assessment and plan as outlined by STEPHIE Garcia.    Mr. Chacon is an 82 yr -old male with cardiac catheterization in 2006 which showed disease in a small PLV branch distally.  Large epicardial vessels were widely patent.  He has been treated  medically.  His other medical issues include hypertension, hyperlipidemia and remote history of pericardial surgery in the 1970s.  He is known to have CKD.  He has diabetes related end organ damage and peripheral neuropathy and orthostasis.     He presented to the emergency room for evaluation of syncopal episodes.  He has had longstanding history of chronic orthostasis and had 2 episodes of syncope with worsening dizziness.  Both the episodes were witnessed.  There was no seizure activity or weakness in the upper or lower extremities.  There was some degree of shortness of breath with no chest pain, palpitation.  Following admission the patient apparently had an episode of feeling funny with some degree of shortness of breath and a lady was noted to have a heart rate in the 30s to 40s requiring atropine.  However on review of the monitor the slowest heart rate noted was 42 bpm.  He has remained in sinus rhythm and has not had any further episodes of bradycardia.    His creatinine was noted to be elevated at 1.82 and troponins mildly elevated at about 20.  CT scan of the head showed no acute changes and VQ scan did not reveal any abnormalities.  MRI of the brain showed chronic small vessel disease and carotid ultrasound showed less than 50% narrowing in both internal carotid arteries.  EKG showed sinus bradycardia with first-degree AV block with no ST-T wave changes suggestive of ischemia.    The patient was awake and alert and responding appropriately to questions.  Heart rate was 58 bpm, regular blood pressure was 140/65 mmHg.  There was no pallor icterus or cyanosis.  Neck exam showed no jugular venous distention but there was bilateral carotid bruit audible.  Exam of the heart showed normal first and second heart sounds with no S3 or S4.  No heart murmurs  Lung exam showed normal breath sounds with no rales or rhonchi  Abdomen is soft with no mass or tenderness  Extremities showed no edema  Cold neurological  deficit was noted.    Labs were reviewed and electrolytes were in the normal range and BUN was 40 with creatinine of 1.77 hemoglobin was 11.8 and platelet count 183.    The most likely etiology for syncope is postural changes in blood pressure in the background of chronic orthostasis/autonomic dysfunction secondary to diabetes.  No definite octaviano arrhythmia noted that would correlate with his symptoms, and would necessitate a pacemaker yet.  Neurological work-up has been negative thus far.    Agree with maintaining a high fluid intake and stopping losartan for now.  His occasions for prostate could be contributing.  He is symptomatically much better following intravenous fluids.  Will hold off midodrine for now because of its potential to elevate his blood pressure and also worsening renal function.  Compression stockings recommended.  Would suggest continuation of antiplatelet therapy with aspirin and Plavix and lipid-lowering therapy with Lipitor.  Agree with considering outpatient Holter monitor for 3 to 4 weeks to make sure that there are no bradyarrhythmias  The patient and his family had several questions and these were answered to their satisfaction    Dr. Ordaz covering for me this weekend..    Electronically signed by Janeen Blevins MD, 09/01/23, 4:10 PM CDT.      Electronically signed by Janeen Blevins MD, 09/01/23, 4:09 PM CDT.

## 2023-09-02 ENCOUNTER — READMISSION MANAGEMENT (OUTPATIENT)
Dept: CALL CENTER | Facility: HOSPITAL | Age: 83
End: 2023-09-02
Payer: COMMERCIAL

## 2023-09-02 VITALS
HEART RATE: 54 BPM | WEIGHT: 160.8 LBS | TEMPERATURE: 97.5 F | HEIGHT: 71 IN | BODY MASS INDEX: 22.51 KG/M2 | SYSTOLIC BLOOD PRESSURE: 119 MMHG | OXYGEN SATURATION: 94 % | RESPIRATION RATE: 18 BRPM | DIASTOLIC BLOOD PRESSURE: 59 MMHG

## 2023-09-02 DIAGNOSIS — R55 SYNCOPE, UNSPECIFIED SYNCOPE TYPE: ICD-10-CM

## 2023-09-02 DIAGNOSIS — R00.1 BRADYCARDIA, SINUS: Primary | ICD-10-CM

## 2023-09-02 PROCEDURE — 94760 N-INVAS EAR/PLS OXIMETRY 1: CPT

## 2023-09-02 PROCEDURE — G0378 HOSPITAL OBSERVATION PER HR: HCPCS

## 2023-09-02 PROCEDURE — 99214 OFFICE O/P EST MOD 30 MIN: CPT | Performed by: INTERNAL MEDICINE

## 2023-09-02 PROCEDURE — 94664 DEMO&/EVAL PT USE INHALER: CPT

## 2023-09-02 PROCEDURE — 96372 THER/PROPH/DIAG INJ SC/IM: CPT

## 2023-09-02 PROCEDURE — 94799 UNLISTED PULMONARY SVC/PX: CPT

## 2023-09-02 PROCEDURE — 25010000002 HEPARIN (PORCINE) PER 1000 UNITS

## 2023-09-02 RX ORDER — TADALAFIL 5 MG/1
5 TABLET ORAL DAILY PRN
Qty: 30 TABLET | Refills: 0 | Status: SHIPPED | OUTPATIENT
Start: 2023-09-02 | End: 2023-09-02 | Stop reason: SDUPTHER

## 2023-09-02 RX ORDER — TADALAFIL 5 MG/1
5 TABLET ORAL DAILY
Qty: 30 TABLET | Refills: 0 | Status: SHIPPED | OUTPATIENT
Start: 2023-09-02 | End: 2023-10-02

## 2023-09-02 RX ADMIN — CHLORHEXIDINE GLUCONATE 15 ML: 1.2 SOLUTION ORAL at 08:55

## 2023-09-02 RX ADMIN — CALCITRIOL 0.25 MCG: 0.25 CAPSULE ORAL at 08:54

## 2023-09-02 RX ADMIN — ATORVASTATIN CALCIUM 20 MG: 20 TABLET, FILM COATED ORAL at 08:55

## 2023-09-02 RX ADMIN — ASPIRIN 81 MG: 81 TABLET, COATED ORAL at 08:54

## 2023-09-02 RX ADMIN — LINAGLIPTIN 5 MG: 5 TABLET, FILM COATED ORAL at 08:55

## 2023-09-02 RX ADMIN — FOLIC ACID 1 MG: 1 TABLET ORAL at 08:54

## 2023-09-02 RX ADMIN — BUDESONIDE AND FORMOTEROL FUMARATE DIHYDRATE 2 PUFF: 160; 4.5 AEROSOL RESPIRATORY (INHALATION) at 07:18

## 2023-09-02 RX ADMIN — TIMOLOL MALEATE 1 DROP: 5 SOLUTION OPHTHALMIC at 08:55

## 2023-09-02 RX ADMIN — FLUOXETINE 20 MG: 20 CAPSULE ORAL at 08:54

## 2023-09-02 RX ADMIN — CLOPIDOGREL BISULFATE 75 MG: 75 TABLET ORAL at 08:54

## 2023-09-02 RX ADMIN — HEPARIN SODIUM 5000 UNITS: 5000 INJECTION INTRAVENOUS; SUBCUTANEOUS at 08:54

## 2023-09-02 RX ADMIN — Medication 2.5 MG: at 08:54

## 2023-09-02 NOTE — OUTREACH NOTE
Prep Survey      Flowsheet Row Responses   [facility] facility patient discharged from? [place]   Is LACE score < 7 ? No   Eligibility Readm Mgmt   Discharge diagnosis loss of conciusnessSyncopal episodes   Does the patient have one of the following disease processes/diagnoses(primary or secondary)? Other   Does the patient have Home health ordered? No   Is there a DME ordered? No   Prep survey completed? Yes            JUDIT NAVARRETE - Registered Nurse

## 2023-09-02 NOTE — PROGRESS NOTES
"  [facility] Progress Note   LOS: 0 days   Patient Care Team:  Yehuda Victor MD as PCP - General    Chief Complaint   Patient presents with    Syncope         Subjective     Interval History:   Patient Denies: shortness of air, chest pain, PND, orthopnea, and syncope  Patient Complaints: dizziness, occasion unsteadiness  History taken from: patient chart family RN    No acute events overnight.  Telemetry showing sinus bradycardia 48 without pauses. No further syncopal episodes since stopping BPH medication. VSS. Walking in room at time of examination    Objective     Vital Sign Min/Max for last 24 hours  Temp  Min: 97.2 °F (36.2 °C)  Max: 97.5 °F (36.4 °C)   BP  Min: 111/57  Max: 152/67   Pulse  Min: 46  Max: 70   Resp  Min: 18  Max: 18   SpO2  Min: 92 %  Max: 97 %   No data recorded   Weight  Min: 72.9 kg (160 lb 12.8 oz)  Max: 72.9 kg (160 lb 12.8 oz)     Flowsheet Rows      Flowsheet Row First Filed Value   Admission Height 180.3 cm (71\") Documented at 08/31/2023 1500   Admission Weight 77.1 kg (170 lb) Documented at 08/31/2023 1500              09/01/23  0607 09/01/23  0923 09/02/23  0640   Weight: 73.9 kg (163 lb) 73.9 kg (162 lb 14.7 oz) 72.9 kg (160 lb 12.8 oz)       Physical Exam:  Physical Exam  Vitals and nursing note reviewed.   Constitutional:       Appearance: Normal appearance. He is well-developed and well-groomed. He is not ill-appearing or diaphoretic.   HENT:      Head: Normocephalic.      Mouth/Throat:      Lips: Pink.      Mouth: Mucous membranes are moist.   Eyes:      General: Lids are normal.      Conjunctiva/sclera: Conjunctivae normal.   Neck:      Thyroid: No thyromegaly.      Vascular: No JVD.      Trachea: Trachea normal.   Cardiovascular:      Rate and Rhythm: Regular rhythm. Bradycardia present.      Pulses: Normal pulses.      Heart sounds: Normal heart sounds, S1 normal and S2 normal. No murmur heard.    No gallop.   Pulmonary:      Effort: Pulmonary effort is normal.      " Breath sounds: Normal breath sounds and air entry.   Musculoskeletal:      Right lower leg: No edema.      Left lower leg: No edema.   Skin:     General: Skin is warm and dry.      Capillary Refill: Capillary refill takes less than 2 seconds.      Coloration: Skin is not ashen, cyanotic or pale.   Neurological:      Mental Status: He is alert and oriented to person, place, and time.   Psychiatric:         Attention and Perception: Attention normal.         Mood and Affect: Mood normal.         Behavior: Behavior is cooperative.         Thought Content: Thought content normal.        Results Reviewed by myself:     Results from last 7 days   Lab Units 09/01/23  0516 08/31/23  1517   SODIUM mmol/L 142 136   POTASSIUM mmol/L 4.5 4.7   CHLORIDE mmol/L 109* 102   CO2 mmol/L 25.0 20.0*   BUN mg/dL 40* 45*   CREATININE mg/dL 1.77* 1.82*   CALCIUM mg/dL 9.1 9.2   BILIRUBIN mg/dL 0.3 0.4   ALK PHOS U/L 75 87   ALT (SGPT) U/L 14 18   AST (SGOT) U/L 14 17   GLUCOSE mg/dL 74 189*       Estimated Creatinine Clearance: 33.8 mL/min (A) (by C-G formula based on SCr of 1.77 mg/dL (H)).    Results from last 7 days   Lab Units 09/01/23  0516 08/31/23  1517   MAGNESIUM mg/dL 2.0 2.0   PHOSPHORUS mg/dL 3.6  --              Results from last 7 days   Lab Units 09/01/23  0516 08/31/23  1517   WBC 10*3/mm3 5.18 6.93   HEMOGLOBIN g/dL 11.8* 12.7*   PLATELETS 10*3/mm3 183 169           No results found for: PROBNP    I/O last 3 completed shifts:  In: 2161.7 [I.V.:2161.7]  Out: 2550 [Urine:2550]    Cardiographics:  ECG/EMG Results (last 24 hours)       Procedure Component Value Units Date/Time    Adult Transthoracic Echo Complete W/ Cont if Necessary Per Protocol [[id number]] Resulted: 09/01/23 1742     Updated: 09/01/23 1743     Target HR (85%) 118     Max. Pred. HR (100%) 139     TAPSE (>1.6) 1.84 cm      LV Sys Vol (BSA corrected) 23.2 cm2      LV Alcantar Vol (BSA corrected) 62.6 cm2      TR max luigi 246.5 cm/sec      MV E max luigi 114.0 cm/sec       MV A max conner 93.6 cm/sec      MR max conner 519.1 cm/sec      MVA(P1/2t) 3.8 cm2      MV mean PG 2.12 mmHg      MV max PG 6.3 mmHg      MV E/A 1.22     LVOT diam 2.04 cm      LVOT area 3.3 cm2      LVIDs 2.7 cm      LVIDd 4.7 cm      LV V1 VTI 22.0 cm      LA dimension (2D)  3.7 cm      IVSd 1.04 cm      FS 43.3 %      Ao mean PG 4.0 mmHg      Ao max PG 6.5 mmHg      TR max PG 24.3 mmHg      SV(MOD-sp4) 76.1 ml      SV(MOD-sp2) 76.7 ml      SV(LVOT) 71.6 ml      SI(MOD-sp4) 39.4 ml/m2      SI(MOD-sp2) 39.7 ml/m2      RVSP(TR) 27.3 mmHg      RVIDd 2.42 cm      RAP systole 3.0 mmHg      PI end-d conner 168.3 cm/sec      PA V2 max 112.7 cm/sec      MVA(VTI) 1.61 cm2      MV V2 VTI 44.4 cm      MV P1/2t 58.2 msec      MV dec time 0.19 msec      MV dec slope 649.1 cm/sec2      MR max .8 mmHg      Med Peak E' Conner 7.3 cm/sec      LVPWd 0.99 cm      LV V1 max 87.8 cm/sec      LV V1 mean PG 2.00 mmHg      LV V1 max PG 3.1 mmHg      LV mass(C)d 168.1 grams      Lat Peak E' Conner 14.5 cm/sec      IVS/LVPW 1.05 cm      ESV(MOD-sp4) 44.9 ml      ESV(MOD-sp2) 44.3 ml      ESV(cubed) 19.1 ml      EF(MOD-sp4) 62.9 %      EF(MOD-sp2) 63.4 %      EF(MOD-bp) 63.0 %      EDV(MOD-sp4) 121.0 ml      EDV(MOD-sp2) 121.0 ml      EDV(cubed) 104.7 ml      AUSTEN(I,D) 2.20 cm2      Ao V2 VTI 32.5 cm      Ao pk conner 127.0 cm/sec      Ao root diam 3.8 cm      ACS 1.99 cm      LA ESV Index (BP) 26.5 ml/m2      Avg E/e' ratio 10.46      CV ECHO SHUNT ASSESSMENT PERFORMED (HIDDEN SCRIPTING) 1    Addenda:            Left ventricular systolic function is normal. Calculated left   ventricular EF = 63% Left ventricular ejection fraction appears to be 61 -   65%.    Saline test results are negative.    Estimated right ventricular systolic pressure from tricuspid   regurgitation is normal (<35 mmHg).    Mild MR, Mild TR    Signed: 09/01/23 1743 by Janeen Blevins MD    SCANNED - TELEMETRY   [[id number]] Resulted: 08/31/23     Updated: 09/01/23  1851    SCANNED - TELEMETRY   [[id number]] Resulted: 08/31/23     Updated: 09/01/23 1852    SCANNED - TELEMETRY   [[id number]] Resulted: 08/31/23     Updated: 09/01/23 1852    SCANNED - TELEMETRY   [[id number]] Resulted: 08/31/23     Updated: 09/01/23 2129    SCANNED - TELEMETRY   [[id number]] Resulted: 08/31/23     Updated: 09/01/23 2130    SCANNED - TELEMETRY   [[id number]] Resulted: 08/31/23     Updated: 09/01/23 2130            Results for orders placed during the hospital encounter of 08/31/23    Adult Transthoracic Echo Complete W/ Cont if Necessary Per Protocol    Interpretation Summary    Left ventricular systolic function is normal. Calculated left ventricular EF = 63% Left ventricular ejection fraction appears to be 61 - 65%.    Saline test results are negative.    Estimated right ventricular systolic pressure from tricuspid regurgitation is normal (<35 mmHg).    Mild MR, Mild TR      Adult Transthoracic Echo Complete W/ Cont if Necessary Per Protocol    Addendum Date: 9/1/2023      Left ventricular systolic function is normal. Calculated left ventricular EF = 63% Left ventricular ejection fraction appears to be 61 - 65%.   Saline test results are negative.   Estimated right ventricular systolic pressure from tricuspid regurgitation is normal (<35 mmHg).   Mild MR, Mild TR     EEG    Result Date: 9/1/2023  Normal study No evidence for epilepsy is seen on this study This report is transcribed using the Dragon dictation system.      CT Head Without Contrast    Result Date: 8/31/2023  No acute intracranial hemorrhage or a space-occupying lesion is seen.    MRI Brain Without Contrast    Result Date: 9/1/2023  No evidence of acute intracranial process. Moderate chronic small vessel disease    NM Lung Ventilation Perfusion    Result Date: 9/1/2023  Normal VQ scan.    US Carotid Bilateral    Result Date: 8/31/2023  Less than 50% narrowing within the bilateral ICAs.      Medication Review:     Current  Facility-Administered Medications:     albuterol (PROVENTIL) nebulizer solution 0.083% 2.5 mg/3mL, 2.5 mg, Nebulization, Q4H PRN, Peter Kim MD    aspirin EC tablet 81 mg, 81 mg, Oral, Daily, Peter Kim MD, 81 mg at 09/02/23 0854    atorvastatin (LIPITOR) tablet 20 mg, 20 mg, Oral, Daily, Peter Kim MD, 20 mg at 09/02/23 0855    sennosides-docusate (PERICOLACE) 8.6-50 MG per tablet 2 tablet, 2 tablet, Oral, BID **AND** polyethylene glycol (MIRALAX) packet 17 g, 17 g, Oral, Daily PRN **AND** bisacodyl (DULCOLAX) EC tablet 5 mg, 5 mg, Oral, Daily PRN **AND** bisacodyl (DULCOLAX) suppository 10 mg, 10 mg, Rectal, Daily PRN, Peter Kim MD    budesonide-formoterol (SYMBICORT) 160-4.5 MCG/ACT inhaler 2 puff, 2 puff, Inhalation, BID - RT, Peter Kim MD, 2 puff at 09/02/23 0718    calcitriol (ROCALTROL) capsule 0.25 mcg, 0.25 mcg, Oral, Daily, Peter Kim MD, 0.25 mcg at 09/02/23 0854    Calcium Replacement - Follow Nurse / BPA Driven Protocol, , Does not apply, PRN, Peter Kim MD    chlorhexidine (PERIDEX) 0.12 % solution 15 mL, 15 mL, Mouth/Throat, Q12H, Peter Kim MD, 15 mL at 09/02/23 0855    clopidogrel (PLAVIX) tablet 75 mg, 75 mg, Oral, Daily, Peter Kim MD, 75 mg at 09/02/23 0854    FLUoxetine (PROzac) capsule 20 mg, 20 mg, Oral, Daily, Peter Kim MD, 20 mg at 09/02/23 0854    folic acid (FOLVITE) tablet 1 mg, 1 mg, Oral, Daily, Peter Kim MD, 1 mg at 09/02/23 0854    glipizide (GLUCOTROL) half tablet 2.5 mg, 2.5 mg, Oral, QAM AC, Peter Kim MD, 2.5 mg at 09/02/23 0854    heparin (porcine) 5000 UNIT/ML injection 5,000 Units, 5,000 Units, Subcutaneous, Q12H, Peter Kim MD, 5,000 Units at 09/02/23 0854    latanoprost (XALATAN) 0.005 % ophthalmic solution 1 drop, 1 drop, Both Eyes, Nightly, Peter Kim MD, 1 drop at 09/01/23 2004    linagliptin (TRADJENTA) tablet 5 mg, 5 mg, Oral, Daily, Peter Kim MD, 5 mg at 09/02/23 0855    Magnesium Standard  "Dose Replacement - Follow Nurse / BPA Driven Protocol, , Does not apply, Julio BURTON Tomas, MD    ondansetron (ZOFRAN) tablet 4 mg, 4 mg, Oral, Q6H PRN **OR** ondansetron (ZOFRAN) injection 4 mg, 4 mg, Intravenous, Q6H PRN, Peter Kim MD    Phosphorus Replacement - Follow Nurse / BPA Driven Protocol, , Does not apply, Julio BURTON Tomas, MD    Potassium Replacement - Follow Nurse / BPA Driven Protocol, , Does not apply, PRNJulio Tomas, MD    sodium chloride 0.9 % flush 10 mL, 10 mL, Intravenous, PRN, Peter Kim MD    sodium chloride 0.9 % flush 10 mL, 10 mL, Intravenous, Q12H, Peter Kim MD, 10 mL at 09/01/23 2138    sodium chloride 0.9 % flush 10 mL, 10 mL, Intravenous, PRN, Peter Kim MD    sodium chloride 0.9 % infusion 40 mL, 40 mL, Intravenous, PRN, Peter Kim MD    timolol (TIMOPTIC) 0.5 % ophthalmic solution 1 drop, 1 drop, Both Eyes, BID, Peter Kim MD, 1 drop at 09/02/23 0855    Patient's recent labs and results as included in the subjective data were reviewed by me. Any pertinent outside data will be included.        Assessment & Plan       Syncopal episodes    Syncope    1. Syncope. recurrent with chronic orthostasis/autonomic dysfunction secondary to diabetes.  EKG showing sinus bradycardia, first-degree AV block.  Neurology workup with negative MRI and CT. Carotid duplex showing mild stenosis less than 50%.  Positive ortho's upon arrival. In 2018 had a negative Tilt table test.    Today, complains of occasional dizziness and \"unsteadiness\". No reoccurrence of syncope since stopping BPH medication. Telemetry showing sinus bradycardia with  1st degree -Echocardiogram pending    On home ibersartan. This was held this admission. B/P marginal  Continue to hold irbesartan. Will reassess in the ou pt setting.   Increase water intake to a minimum of 64 ounces daily  Wear compression stockings    Out pt holter monitorJaret to assess for significant bradycardia and pauses, " etc.   Instructed to come to [facility] on Tuesday for placement.      2.  Hx of CAD. . Cardiac catheterization in 2006 which showed disease in a small PLV branch distally.  Large epicardial vessels were widely patent.  He has been treated medically    Continues to deny CP  -Continue aspirin, Plavix, Lipitor 20 mg. No BB due to baseline bradycardia     3.  Hyperlipidemia: Chronic, continue statin    I spent 30 minutes caring for David on this date of service. This time includes time spent by me in the following activities: reviewing tests, obtaining and/or reviewing a separately obtained history, performing a medically appropriate examination and/or evaluation, counseling and educating the patient/family/caregiver, ordering medications, tests, or procedures, referring and communicating with other health care professionals, and documenting information in the medical record      Plan for disposition:From a cardiology standpoint, we can follow him in the outpt setting. Appt with Dr. Blevins in 4 weeks.        This document has been electronically signed by STEPHIE Dee on September 2, 2023 11:00 CDT   Electronically signed by STEPHIE Dee, 09/02/23, 11:00 AM CDT.      I personally saw and examined David Chacon after the APRN.  I personally performed a history and physical examination of the patient.  I personally reviewed independent findings and plan of care.  I discussed management with the APRN.  I agree with the APRN's documentation.    Subjective: In good spirits this morning.  Denies any further episodes of syncope.    Vitals:    09/02/23 0640 09/02/23 0718 09/02/23 0745 09/02/23 0811   BP:   119/59    BP Location:   Right arm    Patient Position:   Lying    Pulse:  56 53 54   Resp:  18 18    Temp:   97.5 °F (36.4 °C)    TempSrc:   Oral    SpO2:  96% 94%    Weight: 72.9 kg (160 lb 12.8 oz)      Height:         1.  Syncope:  Secondary to orthostatic hypotension  versus autonomic dysfunction.  EKG without significant abnormalities except first-degree AV block.  No events on telemetry.  Neurology and carotid work-up performed.  Patient ARB will be held.  We will plan for outpatient heart monitor and follow-up with Dr. Acosta.    Thank for the consult.        Electronically signed by Mariama Ordaz MD, 09/02/23, 1:02 PM CDT.

## 2023-09-02 NOTE — DISCHARGE SUMMARY
[facility] Medicine Services  DISCHARGE SUMMARY       Date of Admission: 8/31/2023  Date of Discharge:  9/2/2023  Primary Care Physician: Yehuda Victor MD    Presenting Problem/History of Present Illness:  Syncope and collapse [R55]  Syncopal episodes [R55]  Syncope [R55]  Sinus bradycardia by electrocardiogram [R00.1]   Mr. Chacon is a 82-year-old male with cardiac catheterization in 2006 which showed disease in a small PLV branch distally.  Large epicardial vessels were widely patent.  He has been treated medically. PMH include hypertension, hyperlipidemia and remote history of pericardial surgery in the 1970s.  CKD stage 4.  He has diabetes related end organ damage and peripheral neuropathy and orthostasis.  [facility] diagnosed with COVID-19 and treated with bamlanivimab. Came to ER after passing out while sitting in a chair, no chest pain, refers shortness of breath frequently and long history sometimes not able to find words to talk.         Final Discharge Diagnoses:  Active Hospital Problems    Diagnosis     **Syncopal episodes     Syncope        Consults:   Consults       Date and Time Order Name Status Description    9/1/2023  4:08 PM Inpatient Urology Consult      8/31/2023  5:50 PM Inpatient Neurology Consult General Completed     8/31/2023  5:35 PM Inpatient Neurology Consult General Completed     8/31/2023  4:52 PM Inpatient Cardiology Consult Completed             Procedures Performed:                 Pertinent Test Results:   Lab Results (most recent)       Procedure Component Value Units Date/Time    Magnesium [[id number]]  (Normal) Collected: 09/01/23 0516    Specimen: Blood Updated: 09/01/23 0616     Magnesium 2.0 mg/dL     Comprehensive Metabolic Panel [[id number]]  (Abnormal) Collected: 09/01/23 0516    Specimen: Blood Updated: 09/01/23 0616     Glucose 74 mg/dL      BUN 40 mg/dL      Creatinine 1.77 mg/dL      Sodium  142 mmol/L      Potassium 4.5 mmol/L      Chloride 109 mmol/L      CO2 25.0 mmol/L      Calcium 9.1 mg/dL      Total Protein 6.5 g/dL      Albumin 3.8 g/dL      ALT (SGPT) 14 U/L      AST (SGOT) 14 U/L      Alkaline Phosphatase 75 U/L      Total Bilirubin 0.3 mg/dL      Globulin 2.7 gm/dL      A/G Ratio 1.4 g/dL      BUN/Creatinine Ratio 22.6     Anion Gap 8.0 mmol/L      eGFR 38.1 mL/min/1.73     Narrative:      GFR Normal >60  Chronic Kidney Disease <60  Kidney Failure <15    The GFR formula is only valid for adults with stable renal function between ages 18 and 70.    Iron Profile [[id number]]  (Normal) Collected: 09/01/23 0516    Specimen: Blood Updated: 09/01/23 0616     Iron 77 mcg/dL      Iron Saturation (TSAT) 24 %      Transferrin 219 mg/dL      TIBC 326 mcg/dL     Phosphorus [[id number]]  (Normal) Collected: 09/01/23 0516    Specimen: Blood Updated: 09/01/23 0616     Phosphorus 3.6 mg/dL     CBC & Differential [[id number]]  (Abnormal) Collected: 09/01/23 0516    Specimen: Blood Updated: 09/01/23 0611    Narrative:      The following orders were created for panel order CBC & Differential.  Procedure                               Abnormality         Status                     ---------                               -----------         ------                     Manual Differential[[id number]]                                                         CBC Auto Differential[[id number]]        Abnormal            Final result                 Please view results for these tests on the individual orders.    CBC Auto Differential [[id number]]  (Abnormal) Collected: 09/01/23 0516    Specimen: Blood Updated: 09/01/23 0611     WBC 5.18 10*3/mm3      RBC 3.78 10*6/mm3      Hemoglobin 11.8 g/dL      Hematocrit 35.8 %      MCV 94.7 fL      MCH 31.2 pg      MCHC 33.0 g/dL      RDW 12.3 %      RDW-SD 42.5 fl      MPV 11.1 fL      Platelets 183 10*3/mm3     Hemoglobin A1c [[id number]]  (Abnormal) Collected: 09/01/23 0516     Specimen: Blood Updated: 09/01/23 0610     Hemoglobin A1C 8.20 %     Narrative:      Hemoglobin A1C Ranges:    Increased Risk for Diabetes  5.7% to 6.4%  Diabetes                     >= 6.5%  Diabetic Goal                < 7.0%    TSH Rfx On Abnormal To Free T4 [[id number]]  (Normal) Collected: 08/31/23 1517    Specimen: Blood Updated: 09/01/23 0129     TSH 2.460 uIU/mL     POC Glucose Once [[id number]]  (Abnormal) Collected: 08/31/23 2122    Specimen: Blood Updated: 08/31/23 2143     Glucose 218 mg/dL      Comment: RN NotifiedOperator: [id number] SHIRLEY GILMOREMeter ID: [id number]       [place] Draw [[id number]] Collected: 08/31/23 1517    Specimen: Blood Updated: 08/31/23 1632    Narrative:      The following orders were created for panel order [place] Draw.  Procedure                               Abnormality         Status                     ---------                               -----------         ------                     Green Top (Gel)[[id number]]                                  Final result               Lavender Top[[id number]]                                     Final result               Gold Top - SST[[id number]]                                   Final result               Light Blue Top[[id number]]                                   Final result                 Please view results for these tests on the individual orders.    Gold Top - SST [[id number]] Collected: 08/31/23 1517    Specimen: Blood Updated: 08/31/23 1632     Extra Tube Hold for add-ons.     Comment: Auto resulted.       Green Top (Gel) [[id number]] Collected: 08/31/23 1517    Specimen: Blood Updated: 08/31/23 1632     Extra Tube Hold for add-ons.     Comment: Auto resulted.       Lavender Top [[id number]] Collected: 08/31/23 1517    Specimen: Blood Updated: 08/31/23 1632     Extra Tube hold for add-on     Comment: Auto resulted       Light Blue Top [[id number]] Collected: 08/31/23 1517    Specimen: Blood Updated: 08/31/23 1632     Extra Tube  "Hold for add-ons.     Comment: Auto resulted       D-dimer, Quantitative [[id number]]  (Normal) Collected: 08/31/23 1517    Specimen: Blood Updated: 08/31/23 1552     D-Dimer, Quantitative 290 ng/mL (FEU)     Narrative:      According to the assay 's published package insert, a normal (<500 ng/mL (FEU)) D-dimer result in conjunction with a non-high clinical probability assessment, excludes deep vein thrombosis (DVT) and pulmonary embolism (PE) with high sensitivity.    D-dimer values increase with age and this can make VTE exclusion of an older population difficult. To address this, the [employer], based on best available evidence and recent guidelines, recommends that clinicians use age-adjusted D-dimer thresholds in patients greater than 50 years of age with: a) a low probability of PE who do not meet all Pulmonary Embolism Rule Out Criteria, or b) in those with intermediate probability of PE.   The formula for an age-adjusted D-dimer cut-off is \"age*10\".  For example, a 60 year old patient would have an age-adjusted cut-off of 600 ng/mL (FEU) and an 80 year old 800 ng/mL (FEU).      Urinalysis With Culture If Indicated - Urine, Clean Catch [[id number]]  (Normal) Collected: 08/31/23 1522    Specimen: Urine, Clean Catch Updated: 08/31/23 1549     Color, UA Yellow     Appearance, UA Clear     pH, UA 5.5     Specific Gravity, UA 1.015     Glucose, UA Negative     Ketones, UA Negative     Bilirubin, UA Negative     Blood, UA Negative     Protein, UA Negative     Leuk Esterase, UA Negative     Nitrite, UA Negative     Urobilinogen, UA 0.2 E.U./dL    Narrative:      In absence of clinical symptoms, the presence of pyuria, bacteria, and/or nitrites on the urinalysis result does not correlate with infection.  Urine microscopic not indicated.    Comprehensive Metabolic Panel [[id number]]  (Abnormal) Collected: 08/31/23 1517    Specimen: Blood Updated: 08/31/23 1544     Glucose 189 mg/dL      " BUN 45 mg/dL      Creatinine 1.82 mg/dL      Sodium 136 mmol/L      Potassium 4.7 mmol/L      Chloride 102 mmol/L      CO2 20.0 mmol/L      Calcium 9.2 mg/dL      Total Protein 7.3 g/dL      Albumin 4.1 g/dL      ALT (SGPT) 18 U/L      AST (SGOT) 17 U/L      Alkaline Phosphatase 87 U/L      Total Bilirubin 0.4 mg/dL      Globulin 3.2 gm/dL      A/G Ratio 1.3 g/dL      BUN/Creatinine Ratio 24.7     Anion Gap 14.0 mmol/L      eGFR 36.9 mL/min/1.73     Narrative:      GFR Normal >60  Chronic Kidney Disease <60  Kidney Failure <15    The GFR formula is only valid for adults with stable renal function between ages 18 and 70.    Magnesium [[id number]]  (Normal) Collected: 08/31/23 1517    Specimen: Blood Updated: 08/31/23 1544     Magnesium 2.0 mg/dL     Single High Sensitivity Troponin T [[id number]]  (Abnormal) Collected: 08/31/23 1517    Specimen: Blood Updated: 08/31/23 1544     HS Troponin T 20 ng/L     Narrative:      High Sensitive Troponin T Reference Range:  <10.0 ng/L- Negative Female for AMI  <15.0 ng/L- Negative Male for AMI  >=10 - Abnormal Female indicating possible myocardial injury.  >=15 - Abnormal Male indicating possible myocardial injury.   Clinicians would have to utilize clinical acumen, EKG, Troponin, and serial changes to determine if it is an Acute Myocardial Infarction or myocardial injury due to an underlying chronic condition.         CBC & Differential [[id number]]  (Abnormal) Collected: 08/31/23 1517    Specimen: Blood Updated: 08/31/23 1525    Narrative:      The following orders were created for panel order CBC & Differential.  Procedure                               Abnormality         Status                     ---------                               -----------         ------                     CBC Auto Differential[[id number]]        Abnormal            Final result                 Please view results for these tests on the individual orders.    CBC Auto Differential [[id number]]   (Abnormal) Collected: 08/31/23 1517    Specimen: Blood Updated: 08/31/23 1525     WBC 6.93 10*3/mm3      RBC 4.11 10*6/mm3      Hemoglobin 12.7 g/dL      Hematocrit 38.4 %      MCV 93.4 fL      MCH 30.9 pg      MCHC 33.1 g/dL      RDW 12.4 %      RDW-SD 42.5 fl      MPV 10.9 fL      Platelets 169 10*3/mm3      Neutrophil % 64.6 %      Lymphocyte % 25.5 %      Monocyte % 7.4 %      Eosinophil % 2.3 %      Basophil % 0.1 %      Immature Grans % 0.1 %      Neutrophils, Absolute 4.47 10*3/mm3      Lymphocytes, Absolute 1.77 10*3/mm3      Monocytes, Absolute 0.51 10*3/mm3      Eosinophils, Absolute 0.16 10*3/mm3      Basophils, Absolute 0.01 10*3/mm3      Immature Grans, Absolute 0.01 10*3/mm3      nRBC 0.0 /100 WBC           Imaging Results (Most Recent)       Procedure Component Value Units Date/Time    XR Chest 2 View [[id number]] Collected: 09/01/23 1034     Updated: 09/01/23 1649    Narrative:      HISTORY:  The patient has a history of chest pain.    VIEWS:  Frontal film of the chest obtained.    FINDINGS:  No infiltrate, effusion or pneumothorax is seen.    NM Lung Ventilation Perfusion [[id number]] Collected: 09/01/23 1016     Updated: 09/01/23 1019    Narrative:      HISTORY:  Chest pain, nonspecific    COMPARISON:  Chest radiograph from today.    TECHNIQUE:  35 mCi technetium 99m DTPA for ventilation.  6.6 mCi technetium 99m MAA intravenously for perfusion.  Multiple projections over the chest were made during the ventilation and  perfusion phases.    FINDINGS:  Distribution of radiopharmaceutical on the perfusion phase is homogeneous.  No  mismatched segmental or subsegmental perfusion defects are identified.      Impression:      Normal VQ scan.    MRI Brain Without Contrast [[id number]] Collected: 09/01/23 0842     Updated: 09/01/23 0849    Narrative:      MRI BRAIN WO CONTRAST    HISTORY: Headache, chronic, no new features    COMPARISON: Head CT dated 8/31/2023    TECHNIQUE: Multiplanar imaging of the brain  was performed in a routine fashion  without contrast.    FINDINGS:  Diffusion: No restriction of diffusion to suggest acute ischemia.    Midline structures: Nondisplaced.    Ventricles: The ventricles are prominent in size, but there is no evidence of  hydrocephalus. This is consistent with generalized volume loss.    Masses: No masses or mass effect.    Basilar cisterns: Maintained.    Extra axial space: Prominence of the extra-axial spaces is consistent with  generalized volume loss.    Gray-white matter signal: Increased T2/FLAIR signal in the periventricular and  subcortical white matter, consistent with chronic microvascular ischemia.    Cerebellum: Normal.    Brainstem: T2 hyperintensities in the brandon consistent with chronic small vessel  disease.    Other: Proximal cervical spinal cord is normal. Bilateral globes and orbits are  unremarkable. Normal cerebrovascular flow voids noted. No abnormal signal in the  mastoid air cells or paranasal sinuses.      Impression:        No evidence of acute intracranial process. Moderate chronic small vessel disease    CT Head Without Contrast [[id number]] Collected: 08/31/23 1738     Updated: 08/31/23 1954    Narrative:      Chronic headache.    TECHNIQUE:  Axial images were performed from the calvarium through the base of the skull  followed by 2D multiplanar reformats.    Ventricles and sulci are within normal limits.  No hydrocephalus or midline  shift is seen.  No acute hemorrhage or a space-occupying lesion is identified.      Impression:      No acute intracranial hemorrhage or a space-occupying lesion is seen.    US Carotid Bilateral [[id number]] Collected: 08/31/23 1928     Updated: 08/31/23 1935    Narrative:      INDICATION:  slurred speech.    TECHNIQUE:  High-resolution gray scale imaging, color Doppler, velocity recordings, and  spectral analysis of the common carotid, external carotid, and internal carotid  arteries bilaterally were obtained.  Waveform analysis  "of the bilateral  vertebral arteries was performed.    FINDINGS:  Moderate atherosclerotic plaquing involves both carotid systems.  Antegrade flow  within both vertebral arteries.    Right distal CCA peak systolic velocity = 111 cm/s  Right ICA peak systolic velocity = 111 cm/s    Left distal CCA peak systolic velocity = 83 cm/s  Left ICA peak systolic velocity = 150 cm/s      Impression:      Less than 50% narrowing within the bilateral ICAs.                Chief Complaint on Day of Discharge: None    Hospital Course:  The patient is a 82 y.o. male who presented to [facility] with syncopal episodes.  Patient found to suffer from orthostatic hypotension secondary to Avapro, which was discontinued at time of admission.  Patient also cautiously rehydrated during hospitalization with crystalloid fluid.  Patient had MRI brain done within normal limits.  Patient also had EEG done during hospitalization which was within normal limits.  Patient seen by cardiology during hospitalization, with no cardiac interventions required.  Patient status post positive tilt table test a few years ago, so no tilt table testing required during hospitalization.  Patient noted to suffer from transient bradycardia overnight, and ultimately discharged home with Holter monitor in place per cardiology recommendations.  Patient also suffered from urinary retention issues and was evaluated by urology during hospitalization.  [facility] ultimately [facility] for [facility]PH management.  Patient will ultimately follow-up with urology, cardiology, and primary care physician after hospital discharge..      Condition on Discharge:  Fair     Physical Exam on Discharge:  /59 (BP Location: Right arm, Patient Position: Lying)   Pulse 54   Temp 97.5 °F (36.4 °C) (Oral)   Resp 18   Ht 180.3 cm (70.98\")   Wt 72.9 kg (160 lb 12.8 oz)   SpO2 94%   BMI 22.44 kg/m²   Physical Exam  Constitutional:       Appearance: Normal appearance. " He is normal weight.   HENT:      Head: Normocephalic and atraumatic.      Right Ear: Ear canal normal.      Left Ear: Ear canal normal.      Nose: Nose normal.      Mouth/Throat:      Mouth: Mucous membranes are moist.      Pharynx: Oropharynx is clear.   Eyes:      Extraocular Movements: Extraocular movements intact.      Conjunctiva/sclera: Conjunctivae normal.      Pupils: Pupils are equal, round, and reactive to light.   Cardiovascular:      Rate and Rhythm: Normal rate and regular rhythm.      Pulses: Normal pulses.      Heart sounds: Normal heart sounds.   Pulmonary:      Effort: Pulmonary effort is normal.      Breath sounds: Normal breath sounds.   Abdominal:      General: Abdomen is flat. Bowel sounds are normal.      Palpations: Abdomen is soft.   Musculoskeletal:         General: Normal range of motion.      Cervical back: Normal range of motion and neck supple.   Skin:     General: Skin is warm and dry.      Capillary Refill: Capillary refill takes less than 2 seconds.   Neurological:      General: No focal deficit present.      Mental Status: He is alert and oriented to person, place, and time. Mental status is at baseline.   Psychiatric:         Mood and Affect: Mood normal.         Behavior: Behavior normal.         Thought Content: Thought content normal.         Judgment: Judgment normal.        Discharge Dis[place] [facility] [place] Medications:     Discharge Medications        New Medications        Instructions Start Date   tadalafil 5 MG tablet  Commonly known as: Cialis   5 mg, Oral, Daily, For BPH             Changes to Medications        Instructions Start Date   latanoprost 0.005 % ophthalmic solution  Commonly known as: XALATAN  What changed: when to take this   ADMINISTER 1 DROP TO BOTH EYES EVERY NIGHT.             Continue These Medications        Instructions Start Date   albuterol sulfate  (90 Base) MCG/ACT inhaler  Commonly known as: PROVENTIL HFA;VENTOLIN  HFA;PROAIR HFA   2 puffs, Inhalation, Every 4 Hours PRN      aspirin 81 MG tablet   81 mg, Oral, Daily      atorvastatin 20 MG tablet  Commonly known as: LIPITOR   20 mg, Oral, Daily      budesonide-formoterol 160-4.5 MCG/ACT inhaler  Commonly known as: SYMBICORT   2 puffs, Inhalation, 2 Times Daily - RT      calcitriol 0.25 MCG capsule  Commonly known as: ROCALTROL  Notes to patient: Take as directed   0.25 mcg, Oral, Every Other Day, Pt said they take this every other day. Pt said he is due for this today.      chlorhexidine 0.12 % solution  Commonly known as: PERIDEX   15 mL, Mouth/Throat, 2 Times Daily      Cinnamon 500 MG tablet   1,000 mg, Oral, 2 Times Daily      clopidogrel 75 MG tablet  Commonly known as: PLAVIX   75 mg, Oral, Daily      Cyanocobalamin 1000 MCG/ML kit  Notes to patient: Use as directed   Injection, Every 30 Days      fish oil 1000 MG capsule capsule   Oral, Daily With Breakfast      FLUoxetine 20 MG capsule  Commonly known as: PROzac   20 mg, Oral, Daily      folic acid 1 MG tablet  Commonly known as: FOLVITE   1 mg, Oral, Daily      glimepiride 4 MG tablet  Commonly known as: AMARYL   4 mg, Oral, 2 Times Daily, Pt takes this once in the morning and once before bed at home       irbesartan 75 MG tablet  Commonly known as: AVAPRO   75 mg, Oral, Nightly      SITagliptin 100 MG tablet  Commonly known as: JANUVIA   100 mg, Oral, Daily      timolol 0.5 % ophthalmic solution  Commonly known as: TIMOPTIC  Notes to patient: Take as directed   1 drop, Both Eyes, Take As Directed      vitamin C 250 MG tablet  Commonly known as: ASCORBIC ACID   500 mg, Oral, Daily      Vitamin D3 10 MCG (400 UNIT) capsule   Oral, Daily      Zinc 50 MG capsule   50 mg, Oral, Daily             Stop These Medications      alfuzosin 10 MG 24 hr tablet  Commonly known as: UROXATRAL              Discharge Diet: Cardiac    Activity at Discharge: Slowly return to normal activity level    Discharge Care Plan/Instructions:  Follow-up with cardiology, urology, and PCP on outpatient basis.    Follow-up Appointments:   No future appointments.    Test Results Pending at Discharge:               Time: 825

## 2023-09-05 ENCOUNTER — READMISSION MANAGEMENT (OUTPATIENT)
Dept: CALL CENTER | Facility: HOSPITAL | Age: 83
End: 2023-09-05
Payer: COMMERCIAL

## 2023-09-05 NOTE — OUTREACH NOTE
Medical Week 1 Survey      Flowsheet Row Responses   [facility], [place] patient discharged from? [place]   Does the patient have one of the following disease processes/diagnoses(primary or secondary)? Other   Week 1 attempt successful? Yes   Call start time 1637   Call end time 1641   Discharge diagnosis loss of conciusnessSyncopal episodes   Meds reviewed with patient/caregiver? Yes   Is the patient having any side effects they believe may be caused by any medication additions or changes? No   Does the patient have all medications ordered at discharge? Yes   Is the patient taking all medications as directed (includes completed medication regime)? Yes   Comments regarding appointments Cardiology follow up 9/30/23   Does the patient have a primary care provider?  Yes   Does the patient have an appointment with their PCP within 7 days of discharge? Greater than 7 days   Comments regarding PCP PCP follow 9/12/23   What is preventing the patient from scheduling follow up appointments within 7 days of discharge? Earlier appointment not available   Nursing Interventions Verified appointment date/time/provider   Has the patient kept scheduled appointments due by today? N/A   Has home health visited the patient within 72 hours of discharge? N/A   Psychosocial issues? No   Did the patient receive a copy of their discharge instructions? Yes   Nursing interventions Reviewed instructions with patient   What is the patient's perception of their health status since discharge? Improving   Is the patient/caregiver able to teach back signs and symptoms related to disease process for when to call PCP? Yes   Is the patient/caregiver able to teach back signs and symptoms related to disease process for when to call 911? Yes   Is the patient/caregiver able to teach back the hierarchy of who to call/visit for symptoms/problems? PCP, Specialist, Home health nurse, Urgent Care, ED, 911 Yes   Week 1 call completed? Yes   Would this  patient benefit from a Referral to [facility] Social Work? No   Is the patient interested in additional calls from an ambulatory ? No   Wrap up additional comments Patient reports he is improving, wearing holter monitor. Has PCP and cardiology follow up appts. scheduled   Call end time 1641            Shereen NAVARRETE - Registered Nurse

## 2023-09-13 LAB
QT INTERVAL: 458 MS
QTC INTERVAL: 429 MS

## 2023-09-14 ENCOUNTER — READMISSION MANAGEMENT (OUTPATIENT)
Dept: CALL CENTER | Facility: HOSPITAL | Age: 83
End: 2023-09-14
Payer: COMMERCIAL

## 2023-09-14 NOTE — OUTREACH NOTE
Medical Week 2 Survey      Flowsheet Row Responses   [facility] patient discharged from? [place]   Does the patient have one of the following disease processes/diagnoses(primary or secondary)? Other   Week 2 attempt successful? Yes   Call start time 1421   Discharge diagnosis LOC/Syncopal episodes   Call end time 1426   Meds reviewed with patient/caregiver? Yes   Is the patient having any side effects they believe may be caused by any medication additions or changes? No   Does the patient have all medications ordered at discharge? Yes   Is the patient taking all medications as directed (includes completed medication regime)? Yes   Comments regarding appointments Nephro 9/14/23   Does the patient have a primary care provider?  Yes   Does the patient have an appointment with their PCP within 7 days of discharge? Greater than 7 days   Comments regarding PCP PCP follow 9/12/23--completed   Has the patient kept scheduled appointments due by today? Yes   Has home health visited the patient within 72 hours of discharge? N/A   Psychosocial issues? No   Comments Pt reports he has a Holter monitor (noted that pt is to wear for 3 weeks)   Did the patient receive a copy of their discharge instructions? Yes   Nursing interventions Reviewed instructions with patient   What is the patient's perception of their health status since discharge? Improving  [Denies any further syncopal episodes and relates to a medication now discontinued, has been compliant with his f/u appts, holter in place.  No immediate needs or concerns today.]   Is the patient/caregiver able to teach back signs and symptoms related to disease process for when to call PCP? Yes   Is the patient/caregiver able to teach back signs and symptoms related to disease process for when to call 911? Yes   Week 2 Call Completed? Yes   Graduated Yes  [Improving, no immediate needs]   Call end time 1426            SUELLEN NOLASCO - Registered Nurse